# Patient Record
Sex: FEMALE | Race: BLACK OR AFRICAN AMERICAN | NOT HISPANIC OR LATINO | Employment: UNEMPLOYED | URBAN - METROPOLITAN AREA
[De-identification: names, ages, dates, MRNs, and addresses within clinical notes are randomized per-mention and may not be internally consistent; named-entity substitution may affect disease eponyms.]

---

## 2023-06-26 DIAGNOSIS — I10 HYPERTENSION, UNSPECIFIED TYPE: ICD-10-CM

## 2023-06-26 RX ORDER — CHLORTHALIDONE 25 MG/1
1 TABLET ORAL DAILY
COMMUNITY
Start: 2019-01-08 | End: 2023-06-26 | Stop reason: SDUPTHER

## 2023-06-26 RX ORDER — CHLORTHALIDONE 25 MG/1
25 TABLET ORAL DAILY
Qty: 90 TABLET | Refills: 0 | Status: SHIPPED | OUTPATIENT
Start: 2023-06-26 | End: 2023-07-18 | Stop reason: SINTOL

## 2023-07-02 LAB — URINE CULTURE: NORMAL

## 2023-07-12 ENCOUNTER — PATIENT OUTREACH (OUTPATIENT)
Dept: CARE COORDINATION | Facility: CLINIC | Age: 58
End: 2023-07-12
Payer: MEDICARE

## 2023-07-12 NOTE — PROGRESS NOTES
Discharge Facility:[facility]  Discharge Diagnosis:Urine Retention  Admission Date:07/05/23  Discharge Date: 07/11/23    PCP Appointment Date:07/18/23  Specialist Appointment Date:   Hospital Encounter and Summary: Linked   See discharge assessment below for further details

## 2023-07-13 ENCOUNTER — TELEPHONE (OUTPATIENT)
Dept: PRIMARY CARE | Facility: CLINIC | Age: 58
End: 2023-07-13
Payer: MEDICARE

## 2023-07-13 NOTE — TELEPHONE ENCOUNTER
PT stated that she just got out the hospital and was prescribed Amoxicillin 125 mg tablet. PT stated that she is itching all over possible allergy. PT stated that she needs different antibiotic please she cannot take the itching. Please advise            [facility]  [address]

## 2023-07-14 NOTE — TELEPHONE ENCOUNTER
PT called again and I advised her that Dr. Yeager has the message and has not yet gotten back to us yet about another antibiotic

## 2023-07-18 ENCOUNTER — OFFICE VISIT (OUTPATIENT)
Dept: PRIMARY CARE | Facility: CLINIC | Age: 58
End: 2023-07-18
Payer: MEDICARE

## 2023-07-18 ENCOUNTER — LAB (OUTPATIENT)
Dept: LAB | Facility: LAB | Age: 58
End: 2023-07-18
Payer: MEDICARE

## 2023-07-18 VITALS
SYSTOLIC BLOOD PRESSURE: 112 MMHG | DIASTOLIC BLOOD PRESSURE: 78 MMHG | RESPIRATION RATE: 18 BRPM | WEIGHT: 172.2 LBS | BODY MASS INDEX: 27.79 KG/M2 | OXYGEN SATURATION: 100 % | HEART RATE: 78 BPM

## 2023-07-18 DIAGNOSIS — N13.30 HYDROURETERONEPHROSIS: ICD-10-CM

## 2023-07-18 DIAGNOSIS — R91.8 LUNG INFILTRATE: ICD-10-CM

## 2023-07-18 DIAGNOSIS — N18.9 CHRONIC RENAL IMPAIRMENT, UNSPECIFIED CKD STAGE: ICD-10-CM

## 2023-07-18 DIAGNOSIS — I10 BENIGN ESSENTIAL HTN: ICD-10-CM

## 2023-07-18 DIAGNOSIS — I10 BENIGN ESSENTIAL HTN: Primary | ICD-10-CM

## 2023-07-18 PROBLEM — E78.5 ELEVATED LIPIDS: Status: ACTIVE | Noted: 2023-07-18

## 2023-07-18 PROBLEM — E55.9 AVITAMINOSIS D: Status: ACTIVE | Noted: 2023-07-18

## 2023-07-18 PROBLEM — M51.16 INTERVERTEBRAL DISC DISORDER WITH RADICULOPATHY OF LUMBAR REGION: Status: ACTIVE | Noted: 2023-07-18

## 2023-07-18 PROBLEM — C52 MALIGNANT NEOPLASM OF VAGINA (MULTI): Status: ACTIVE | Noted: 2023-07-18

## 2023-07-18 PROBLEM — E66.9 OBESITY (BMI 30-39.9): Status: ACTIVE | Noted: 2023-07-18

## 2023-07-18 PROBLEM — G54.2 DISORDER OF LEFT CERVICAL NERVE ROOT: Status: ACTIVE | Noted: 2023-07-18

## 2023-07-18 PROBLEM — M25.611 STIFFNESS OF RIGHT SHOULDER JOINT: Status: RESOLVED | Noted: 2023-07-18 | Resolved: 2023-07-18

## 2023-07-18 PROBLEM — R10.32 ABDOMINAL PAIN, LLQ (LEFT LOWER QUADRANT): Status: RESOLVED | Noted: 2023-07-18 | Resolved: 2023-07-18

## 2023-07-18 PROBLEM — R21 RASH/SKIN ERUPTION: Status: ACTIVE | Noted: 2023-07-18

## 2023-07-18 PROBLEM — E83.52 SERUM CALCIUM ELEVATED: Status: ACTIVE | Noted: 2023-07-18

## 2023-07-18 PROBLEM — B00.9 HSV INFECTION: Status: ACTIVE | Noted: 2023-07-18

## 2023-07-18 PROBLEM — I89.0 LYMPHEDEMA: Status: ACTIVE | Noted: 2023-07-18

## 2023-07-18 PROBLEM — N36.44 DETRUSOR SPHINCTER DYSSYNERGIA: Status: ACTIVE | Noted: 2023-07-18

## 2023-07-18 PROBLEM — N32.81 OVERACTIVE BLADDER: Status: ACTIVE | Noted: 2023-07-18

## 2023-07-18 PROBLEM — K59.00 CONSTIPATION: Status: RESOLVED | Noted: 2023-07-18 | Resolved: 2023-07-18

## 2023-07-18 PROBLEM — N39.0 ACUTE UTI: Status: RESOLVED | Noted: 2023-07-18 | Resolved: 2023-07-18

## 2023-07-18 PROBLEM — N39.8 VOIDING DYSFUNCTION: Status: ACTIVE | Noted: 2023-07-18

## 2023-07-18 PROBLEM — E87.6 HYPOKALEMIA: Status: ACTIVE | Noted: 2023-07-18

## 2023-07-18 PROBLEM — L65.9 HAIR LOSS: Status: RESOLVED | Noted: 2023-07-18 | Resolved: 2023-07-18

## 2023-07-18 PROBLEM — R33.9 URINARY RETENTION WITH INCOMPLETE BLADDER EMPTYING: Status: ACTIVE | Noted: 2023-07-18

## 2023-07-18 PROBLEM — R59.0 AXILLARY LYMPHADENOPATHY: Status: ACTIVE | Noted: 2023-07-18

## 2023-07-18 PROBLEM — E83.42 HYPOMAGNESEMIA: Status: ACTIVE | Noted: 2023-07-18

## 2023-07-18 PROBLEM — R23.8 SKIN IRRITATION: Status: RESOLVED | Noted: 2023-07-18 | Resolved: 2023-07-18

## 2023-07-18 PROBLEM — E21.0 PRIMARY HYPERPARATHYROIDISM (MULTI): Status: ACTIVE | Noted: 2023-07-18

## 2023-07-18 PROBLEM — E89.2 STATUS POST PARATHYROIDECTOMY (CMS/HCC): Status: ACTIVE | Noted: 2023-07-18

## 2023-07-18 PROBLEM — G62.9 NEUROPATHY: Status: ACTIVE | Noted: 2023-07-18

## 2023-07-18 PROBLEM — M25.511 RIGHT SHOULDER PAIN: Status: RESOLVED | Noted: 2023-07-18 | Resolved: 2023-07-18

## 2023-07-18 PROBLEM — R00.2 PALPITATIONS: Status: ACTIVE | Noted: 2023-07-18

## 2023-07-18 PROBLEM — M81.8 OTHER OSTEOPOROSIS WITHOUT CURRENT PATHOLOGICAL FRACTURE: Status: ACTIVE | Noted: 2023-07-18

## 2023-07-18 PROBLEM — M54.16 LUMBAR NEURITIS: Status: ACTIVE | Noted: 2023-07-18

## 2023-07-18 PROBLEM — L03.90 CELLULITIS: Status: RESOLVED | Noted: 2023-07-18 | Resolved: 2023-07-18

## 2023-07-18 PROBLEM — N89.5 ACQUIRED VAGINAL ADHESIONS: Status: ACTIVE | Noted: 2023-07-18

## 2023-07-18 PROBLEM — M85.80 OSTEOPENIA: Status: ACTIVE | Noted: 2023-07-18

## 2023-07-18 PROBLEM — M54.12 CERVICAL NEURITIS: Status: ACTIVE | Noted: 2023-07-18

## 2023-07-18 PROBLEM — N31.9 NEUROGENIC BLADDER: Status: ACTIVE | Noted: 2023-07-18

## 2023-07-18 PROBLEM — M25.532 LEFT WRIST PAIN: Status: RESOLVED | Noted: 2023-07-18 | Resolved: 2023-07-18

## 2023-07-18 PROBLEM — M54.9 BACK PAIN: Status: RESOLVED | Noted: 2023-07-18 | Resolved: 2023-07-18

## 2023-07-18 LAB
ANION GAP IN SER/PLAS: 16 MMOL/L (ref 10–20)
BASOPHILS (10*3/UL) IN BLOOD BY AUTOMATED COUNT: 0.07 X10E9/L (ref 0–0.1)
BASOPHILS/100 LEUKOCYTES IN BLOOD BY AUTOMATED COUNT: 0.8 % (ref 0–2)
CALCIUM (MG/DL) IN SER/PLAS: 9.4 MG/DL (ref 8.6–10.3)
CARBON DIOXIDE, TOTAL (MMOL/L) IN SER/PLAS: 19 MMOL/L (ref 21–32)
CHLORIDE (MMOL/L) IN SER/PLAS: 105 MMOL/L (ref 98–107)
CREATININE (MG/DL) IN SER/PLAS: 1.16 MG/DL (ref 0.5–1.05)
EOSINOPHILS (10*3/UL) IN BLOOD BY AUTOMATED COUNT: 0.16 X10E9/L (ref 0–0.7)
EOSINOPHILS/100 LEUKOCYTES IN BLOOD BY AUTOMATED COUNT: 1.9 % (ref 0–6)
ERYTHROCYTE DISTRIBUTION WIDTH (RATIO) BY AUTOMATED COUNT: 18.6 % (ref 11.5–14.5)
ERYTHROCYTE MEAN CORPUSCULAR HEMOGLOBIN CONCENTRATION (G/DL) BY AUTOMATED: 31.2 G/DL (ref 32–36)
ERYTHROCYTE MEAN CORPUSCULAR VOLUME (FL) BY AUTOMATED COUNT: 89 FL (ref 80–100)
ERYTHROCYTES (10*6/UL) IN BLOOD BY AUTOMATED COUNT: 3.02 X10E12/L (ref 4–5.2)
GFR FEMALE: 55 ML/MIN/1.73M2
GLUCOSE (MG/DL) IN SER/PLAS: 96 MG/DL (ref 74–99)
HEMATOCRIT (%) IN BLOOD BY AUTOMATED COUNT: 26.9 % (ref 36–46)
HEMOGLOBIN (G/DL) IN BLOOD: 8.4 G/DL (ref 12–16)
IMMATURE GRANULOCYTES/100 LEUKOCYTES IN BLOOD BY AUTOMATED COUNT: 1.3 % (ref 0–0.9)
LEUKOCYTES (10*3/UL) IN BLOOD BY AUTOMATED COUNT: 8.5 X10E9/L (ref 4.4–11.3)
LYMPHOCYTES (10*3/UL) IN BLOOD BY AUTOMATED COUNT: 1.66 X10E9/L (ref 1.2–4.8)
LYMPHOCYTES/100 LEUKOCYTES IN BLOOD BY AUTOMATED COUNT: 19.5 % (ref 13–44)
MONOCYTES (10*3/UL) IN BLOOD BY AUTOMATED COUNT: 0.66 X10E9/L (ref 0.1–1)
MONOCYTES/100 LEUKOCYTES IN BLOOD BY AUTOMATED COUNT: 7.8 % (ref 2–10)
NEUTROPHILS (10*3/UL) IN BLOOD BY AUTOMATED COUNT: 5.85 X10E9/L (ref 1.2–7.7)
NEUTROPHILS/100 LEUKOCYTES IN BLOOD BY AUTOMATED COUNT: 68.7 % (ref 40–80)
PLATELETS (10*3/UL) IN BLOOD AUTOMATED COUNT: 526 X10E9/L (ref 150–450)
POTASSIUM (MMOL/L) IN SER/PLAS: 3.4 MMOL/L (ref 3.5–5.3)
SODIUM (MMOL/L) IN SER/PLAS: 137 MMOL/L (ref 136–145)
UREA NITROGEN (MG/DL) IN SER/PLAS: 18 MG/DL (ref 6–23)

## 2023-07-18 PROCEDURE — 80048 BASIC METABOLIC PNL TOTAL CA: CPT

## 2023-07-18 PROCEDURE — 3074F SYST BP LT 130 MM HG: CPT

## 2023-07-18 PROCEDURE — 36415 COLL VENOUS BLD VENIPUNCTURE: CPT

## 2023-07-18 PROCEDURE — 99496 TRANSJ CARE MGMT HIGH F2F 7D: CPT

## 2023-07-18 PROCEDURE — 3078F DIAST BP <80 MM HG: CPT

## 2023-07-18 PROCEDURE — 1036F TOBACCO NON-USER: CPT

## 2023-07-18 PROCEDURE — 85025 COMPLETE CBC W/AUTO DIFF WBC: CPT

## 2023-07-18 RX ORDER — CONJUGATED ESTROGENS 0.62 MG/G
CREAM VAGINAL
COMMUNITY
Start: 2023-01-24 | End: 2024-03-20 | Stop reason: ALTCHOICE

## 2023-07-18 RX ORDER — DOCUSATE SODIUM 100 MG/1
CAPSULE, LIQUID FILLED ORAL 2 TIMES DAILY PRN
COMMUNITY
Start: 2019-11-04

## 2023-07-18 RX ORDER — ALFUZOSIN HYDROCHLORIDE 10 MG/1
TABLET, EXTENDED RELEASE ORAL
Status: ON HOLD | COMMUNITY
End: 2023-11-27 | Stop reason: WASHOUT

## 2023-07-18 RX ORDER — POLYETHYLENE GLYCOL 3350 17 G/17G
17 POWDER, FOR SOLUTION ORAL DAILY PRN
COMMUNITY
Start: 2019-11-04

## 2023-07-18 RX ORDER — FERROUS GLUCONATE 324(38)MG
1 TABLET ORAL DAILY
Status: ON HOLD | COMMUNITY
Start: 2023-07-11 | End: 2023-11-27 | Stop reason: WASHOUT

## 2023-07-18 RX ORDER — ACETAMINOPHEN 500 MG
1 TABLET ORAL DAILY
COMMUNITY

## 2023-07-18 NOTE — PROGRESS NOTES
Primary Care Provider: Maninder Yeager MD    Subjective   Laurence Campbell is a 57 y.o. female who presents for Follow-up (Hospital follow up.).    Hospital Follow up    Admitted: 7/5/23  Discharged: 7/11/23    PMH of history of vaginal CA status post tumor excision,  history right nephrectomy, chronic urinary retention requiring intermittent catheterization, frequent UTIs, HTN, hyperparathyroidism, osteoporosis, LLE lymphedema, history of HSV infection.      In the hospital, underwent cystoscopy with ureteral stent insertion on 7/5/2023, hydronephrosis visualized. Meza cath was placed.  Hematuria: Acute blood loss anemia status post PRBC on 7/5 and 7/6   Anemia  Acute renal failure  History chronic urinary retention   Left hydronephrosis status post ureteral stent   Possible urothelial lesion   History vaginal CA   Constipation     Was stated that she will ultimately need a ureteroscopy to identify the cause of obstruction. Maintain meza on DC until follow up with urology.    CT A/P with mod/severe left hydroureteronephrosis, possible   urothelial lesion, liver cysts, small bilateral pleural effusions, small   pericardial effusion.     Possible RUL infiltrate (asymptomatic) - was on zosyn; transitioned to PO Augmentin- developed allergy to Augmentin so could not complete    Has follow up apt with Urology and GYN scheduled    Feeling much better; tired             Review of Systems   Constitutional:  Positive for fatigue. Negative for activity change, appetite change, chills, diaphoresis, fever and unexpected weight change.   HENT: Negative.  Negative for congestion, dental problem, ear discharge, ear pain, hearing loss, mouth sores, nosebleeds, postnasal drip, rhinorrhea, sinus pressure, sneezing, sore throat, tinnitus, trouble swallowing and voice change.    Eyes: Negative.  Negative for photophobia, discharge and visual disturbance.   Respiratory: Negative.  Negative for apnea, cough, chest tightness,  shortness of breath, wheezing and stridor.    Cardiovascular: Negative.  Negative for chest pain, palpitations and leg swelling.   Gastrointestinal: Negative.  Negative for abdominal distention, abdominal pain, anal bleeding, blood in stool, constipation, diarrhea, nausea and rectal pain.   Endocrine: Negative.  Negative for cold intolerance, heat intolerance, polydipsia, polyphagia and polyuria.   Genitourinary:  Positive for hematuria. Negative for decreased urine volume, difficulty urinating, dysuria, flank pain, frequency and urgency.        Chowdhury cath   Musculoskeletal: Negative.  Negative for back pain, gait problem, joint swelling, myalgias, neck pain and neck stiffness.   Skin: Negative.  Negative for color change and rash.   Neurological: Negative.  Negative for dizziness, tremors, seizures, syncope, speech difficulty, weakness, light-headedness, numbness and headaches.   Hematological: Negative.  Does not bruise/bleed easily.   Psychiatric/Behavioral: Negative.  Negative for agitation, confusion, dysphoric mood, sleep disturbance and suicidal ideas. The patient is not nervous/anxious and is not hyperactive.    All other systems reviewed and are negative.        Objective   /78   Pulse 78   Resp 18   Wt 78.1 kg (172 lb 3.2 oz)   SpO2 100%   BMI 27.79 kg/m²     Physical Exam  Vitals reviewed.   Constitutional:       General: She is not in acute distress.     Appearance: Normal appearance. She is normal weight. She is not ill-appearing, toxic-appearing or diaphoretic.   HENT:      Head: Normocephalic and atraumatic.      Nose: Nose normal.   Eyes:      Extraocular Movements: Extraocular movements intact.      Conjunctiva/sclera: Conjunctivae normal.      Pupils: Pupils are equal, round, and reactive to light.   Neck:      Vascular: No carotid bruit.   Cardiovascular:      Rate and Rhythm: Normal rate and regular rhythm.      Pulses: Normal pulses.      Heart sounds: Normal heart sounds. No murmur  heard.     No friction rub. No gallop.   Pulmonary:      Effort: Pulmonary effort is normal. No respiratory distress.      Breath sounds: Normal breath sounds.   Abdominal:      General: Abdomen is flat. Bowel sounds are normal.      Palpations: Abdomen is soft.   Musculoskeletal:         General: Normal range of motion.      Cervical back: Normal range of motion and neck supple.   Lymphadenopathy:      Cervical: No cervical adenopathy.   Skin:     General: Skin is warm and dry.      Capillary Refill: Capillary refill takes less than 2 seconds.   Neurological:      General: No focal deficit present.      Mental Status: She is alert and oriented to person, place, and time. Mental status is at baseline.   Psychiatric:         Mood and Affect: Mood normal.         Behavior: Behavior normal.         Thought Content: Thought content normal.         Judgment: Judgment normal.         Assessment/Plan   Problem List Items Addressed This Visit       Benign essential HTN - Primary    Relevant Orders    CBC and Auto Differential (Completed)    Basic metabolic panel (Completed)     Other Visit Diagnoses       Chronic renal impairment, unspecified CKD stage        Relevant Orders    CBC and Auto Differential (Completed)    Basic metabolic panel (Completed)    Referral to Nephrology    Hydroureteronephrosis        Relevant Orders    CBC and Auto Differential (Completed)    Basic metabolic panel (Completed)    Lung infiltrate        Relevant Orders    CBC and Auto Differential (Completed)    Basic metabolic panel (Completed)    XR chest 2 views (Completed)            CXR with right consolidation- new abx sent.  Renal function slightly improved      Follow up with GYN and urology      Follow up in 3-4 months or sooner as needed

## 2023-07-19 DIAGNOSIS — J18.9 PNEUMONIA OF RIGHT LUNG DUE TO INFECTIOUS ORGANISM, UNSPECIFIED PART OF LUNG: ICD-10-CM

## 2023-07-19 DIAGNOSIS — R91.8 LUNG INFILTRATE: Primary | ICD-10-CM

## 2023-07-19 RX ORDER — DOXYCYCLINE 100 MG/1
100 CAPSULE ORAL 2 TIMES DAILY
Qty: 14 CAPSULE | Refills: 0 | Status: SHIPPED | OUTPATIENT
Start: 2023-07-19 | End: 2023-07-26

## 2023-07-20 ENCOUNTER — PATIENT OUTREACH (OUTPATIENT)
Dept: CARE COORDINATION | Facility: CLINIC | Age: 58
End: 2023-07-20
Payer: MEDICARE

## 2023-07-20 NOTE — PROGRESS NOTES
Call regarding appt. with PCP on 07/18/23 after hospitalization.  At time of outreach call the patient feels as if their condition has (improved  since last visit.  Reviewed the PCP appointment with the pt and addressed any questions or concerns.

## 2023-07-21 ASSESSMENT — ENCOUNTER SYMPTOMS
PALPITATIONS: 0
HEADACHES: 0
ABDOMINAL DISTENTION: 0
RECTAL PAIN: 0
JOINT SWELLING: 0
POLYDIPSIA: 0
AGITATION: 0
WEAKNESS: 0
SPEECH DIFFICULTY: 0
CARDIOVASCULAR NEGATIVE: 1
ENDOCRINE NEGATIVE: 1
RESPIRATORY NEGATIVE: 1
TROUBLE SWALLOWING: 0
DIARRHEA: 0
BRUISES/BLEEDS EASILY: 0
BACK PAIN: 0
SLEEP DISTURBANCE: 0
GASTROINTESTINAL NEGATIVE: 1
NAUSEA: 0
FATIGUE: 1
DIAPHORESIS: 0
RHINORRHEA: 0
MYALGIAS: 0
MUSCULOSKELETAL NEGATIVE: 1
SORE THROAT: 0
SEIZURES: 0
HEMATURIA: 1
DYSPHORIC MOOD: 0
FREQUENCY: 0
DIFFICULTY URINATING: 0
HYPERACTIVE: 0
SHORTNESS OF BREATH: 0
DIZZINESS: 0
ACTIVITY CHANGE: 0
ABDOMINAL PAIN: 0
TREMORS: 0
CONSTIPATION: 0
NEUROLOGICAL NEGATIVE: 1
APNEA: 0
COUGH: 0
UNEXPECTED WEIGHT CHANGE: 0
CHEST TIGHTNESS: 0
FEVER: 0
CONFUSION: 0
WHEEZING: 0
COLOR CHANGE: 0
STRIDOR: 0
PSYCHIATRIC NEGATIVE: 1
POLYPHAGIA: 0
ANAL BLEEDING: 0
NECK STIFFNESS: 0
EYE DISCHARGE: 0
SINUS PRESSURE: 0
FLANK PAIN: 0
VOICE CHANGE: 0
NUMBNESS: 0
NERVOUS/ANXIOUS: 0
DYSURIA: 0
PHOTOPHOBIA: 0
BLOOD IN STOOL: 0
HEMATOLOGIC/LYMPHATIC NEGATIVE: 1
LIGHT-HEADEDNESS: 0
CHILLS: 0
EYES NEGATIVE: 1
APPETITE CHANGE: 0
NECK PAIN: 0

## 2023-07-25 LAB
ANION GAP IN SER/PLAS: 15 MMOL/L (ref 10–20)
CALCIUM (MG/DL) IN SER/PLAS: 10.5 MG/DL (ref 8.6–10.6)
CARBON DIOXIDE, TOTAL (MMOL/L) IN SER/PLAS: 23 MMOL/L (ref 21–32)
CHLORIDE (MMOL/L) IN SER/PLAS: 107 MMOL/L (ref 98–107)
CREATININE (MG/DL) IN SER/PLAS: 1.19 MG/DL (ref 0.5–1.05)
ERYTHROCYTE DISTRIBUTION WIDTH (RATIO) BY AUTOMATED COUNT: 19.8 % (ref 11.5–14.5)
ERYTHROCYTE MEAN CORPUSCULAR HEMOGLOBIN CONCENTRATION (G/DL) BY AUTOMATED: 30.2 G/DL (ref 32–36)
ERYTHROCYTE MEAN CORPUSCULAR VOLUME (FL) BY AUTOMATED COUNT: 95 FL (ref 80–100)
ERYTHROCYTES (10*6/UL) IN BLOOD BY AUTOMATED COUNT: 3.01 X10E12/L (ref 4–5.2)
GFR FEMALE: 53 ML/MIN/1.73M2
GLUCOSE (MG/DL) IN SER/PLAS: 92 MG/DL (ref 74–99)
HEMATOCRIT (%) IN BLOOD BY AUTOMATED COUNT: 28.5 % (ref 36–46)
HEMOGLOBIN (G/DL) IN BLOOD: 8.6 G/DL (ref 12–16)
LEUKOCYTES (10*3/UL) IN BLOOD BY AUTOMATED COUNT: 6.5 X10E9/L (ref 4.4–11.3)
NRBC (PER 100 WBCS) BY AUTOMATED COUNT: 0 /100 WBC (ref 0–0)
PLATELETS (10*3/UL) IN BLOOD AUTOMATED COUNT: 465 X10E9/L (ref 150–450)
POTASSIUM (MMOL/L) IN SER/PLAS: 4.2 MMOL/L (ref 3.5–5.3)
SODIUM (MMOL/L) IN SER/PLAS: 141 MMOL/L (ref 136–145)
UREA NITROGEN (MG/DL) IN SER/PLAS: 18 MG/DL (ref 6–23)

## 2023-08-04 LAB
ALBUMIN (G/DL) IN SER/PLAS: 3.7 G/DL (ref 3.4–5)
ANION GAP IN SER/PLAS: 14 MMOL/L (ref 10–20)
CALCIUM (MG/DL) IN SER/PLAS: 9.9 MG/DL (ref 8.6–10.6)
CARBON DIOXIDE, TOTAL (MMOL/L) IN SER/PLAS: 23 MMOL/L (ref 21–32)
CHLORIDE (MMOL/L) IN SER/PLAS: 107 MMOL/L (ref 98–107)
CREATININE (MG/DL) IN SER/PLAS: 1.12 MG/DL (ref 0.5–1.05)
ERYTHROCYTE DISTRIBUTION WIDTH (RATIO) BY AUTOMATED COUNT: 16.9 % (ref 11.5–14.5)
ERYTHROCYTE MEAN CORPUSCULAR HEMOGLOBIN CONCENTRATION (G/DL) BY AUTOMATED: 31.6 G/DL (ref 32–36)
ERYTHROCYTE MEAN CORPUSCULAR VOLUME (FL) BY AUTOMATED COUNT: 91 FL (ref 80–100)
ERYTHROCYTES (10*6/UL) IN BLOOD BY AUTOMATED COUNT: 3.01 X10E12/L (ref 4–5.2)
GFR FEMALE: 57 ML/MIN/1.73M2
GLUCOSE (MG/DL) IN SER/PLAS: 101 MG/DL (ref 74–99)
HEMATOCRIT (%) IN BLOOD BY AUTOMATED COUNT: 27.5 % (ref 36–46)
HEMOGLOBIN (G/DL) IN BLOOD: 8.7 G/DL (ref 12–16)
LEUKOCYTES (10*3/UL) IN BLOOD BY AUTOMATED COUNT: 6.4 X10E9/L (ref 4.4–11.3)
NRBC (PER 100 WBCS) BY AUTOMATED COUNT: 0 /100 WBC (ref 0–0)
PHOSPHATE (MG/DL) IN SER/PLAS: 4.3 MG/DL (ref 2.5–4.9)
PLATELETS (10*3/UL) IN BLOOD AUTOMATED COUNT: 282 X10E9/L (ref 150–450)
POTASSIUM (MMOL/L) IN SER/PLAS: 3.7 MMOL/L (ref 3.5–5.3)
SODIUM (MMOL/L) IN SER/PLAS: 140 MMOL/L (ref 136–145)
UREA NITROGEN (MG/DL) IN SER/PLAS: 12 MG/DL (ref 6–23)

## 2023-08-08 ENCOUNTER — PATIENT OUTREACH (OUTPATIENT)
Dept: CARE COORDINATION | Facility: CLINIC | Age: 58
End: 2023-08-08
Payer: MEDICARE

## 2023-08-08 NOTE — PROGRESS NOTES
Discharge Facility:[facility]  Discharge Diagnosis:  Hematuria  Admission Date:08/06/23  Discharge Date: 08/07/23    PCP Appointment Date:08/16/23  Specialist Appointment Date:   Hospital Encounter and Summary: Linked   See discharge assessment below for further details  Engagement  Call Start Time: 0902 (8/8/2023  9:02 AM)    Medications  Medications reviewed with patient/caregiver?: Yes (no new meds) (8/8/2023  9:02 AM)  Does the patient have all medications ordered at discharge?: Not applicable (8/8/2023  9:02 AM)  Is the patient taking all medications as directed (includes completed medication regime)?: Yes (8/8/2023  9:02 AM)    Appointments  Does the patient have a primary care provider?: Yes (8/8/2023  9:02 AM)    Self Management  Has home health visited the patient within 72 hours of discharge?: Not applicable (8/8/2023  9:02 AM)  Has all Durable Medical Equipment (DME) been delivered?: No (8/8/2023  9:02 AM)    Patient Teaching  Does the patient have access to their discharge instructions?: Yes (8/8/2023  9:02 AM)  Care Management Interventions: Reviewed instructions with patient (8/8/2023  9:02 AM)  What is the patient's perception of their health status since discharge?: Same (8/8/2023  9:02 AM)    Wrap Up  Call End Time: 0910 (8/8/2023  9:02 AM)

## 2023-08-16 ENCOUNTER — LAB (OUTPATIENT)
Dept: LAB | Facility: LAB | Age: 58
End: 2023-08-16
Payer: MEDICARE

## 2023-08-16 ENCOUNTER — OFFICE VISIT (OUTPATIENT)
Dept: PRIMARY CARE | Facility: CLINIC | Age: 58
End: 2023-08-16
Payer: MEDICARE

## 2023-08-16 VITALS
WEIGHT: 177.2 LBS | RESPIRATION RATE: 22 BRPM | HEART RATE: 106 BPM | BODY MASS INDEX: 28.6 KG/M2 | OXYGEN SATURATION: 100 % | SYSTOLIC BLOOD PRESSURE: 128 MMHG | DIASTOLIC BLOOD PRESSURE: 75 MMHG

## 2023-08-16 DIAGNOSIS — R31.9 HEMATURIA, UNSPECIFIED TYPE: ICD-10-CM

## 2023-08-16 DIAGNOSIS — D64.9 ANEMIA, UNSPECIFIED TYPE: ICD-10-CM

## 2023-08-16 DIAGNOSIS — D64.9 ANEMIA, UNSPECIFIED TYPE: Primary | ICD-10-CM

## 2023-08-16 DIAGNOSIS — B00.9 HSV INFECTION: ICD-10-CM

## 2023-08-16 PROBLEM — G89.29 MUSCULOSKELETAL PAIN, CHRONIC: Status: ACTIVE | Noted: 2018-06-25

## 2023-08-16 PROBLEM — M79.2 NEUROPATHIC PAIN: Status: ACTIVE | Noted: 2018-06-25

## 2023-08-16 PROBLEM — G95.89 OTHER SPECIFIED DISEASES OF SPINAL CORD (MULTI): Status: ACTIVE | Noted: 2018-04-23

## 2023-08-16 PROBLEM — M79.18 MUSCULOSKELETAL PAIN, CHRONIC: Status: ACTIVE | Noted: 2018-06-25

## 2023-08-16 LAB
BASOPHILS (10*3/UL) IN BLOOD BY AUTOMATED COUNT: 0.03 X10E9/L (ref 0–0.1)
BASOPHILS/100 LEUKOCYTES IN BLOOD BY AUTOMATED COUNT: 0.4 % (ref 0–2)
EOSINOPHILS (10*3/UL) IN BLOOD BY AUTOMATED COUNT: 0.1 X10E9/L (ref 0–0.7)
EOSINOPHILS/100 LEUKOCYTES IN BLOOD BY AUTOMATED COUNT: 1.3 % (ref 0–6)
ERYTHROCYTE DISTRIBUTION WIDTH (RATIO) BY AUTOMATED COUNT: 15 % (ref 11.5–14.5)
ERYTHROCYTE MEAN CORPUSCULAR HEMOGLOBIN CONCENTRATION (G/DL) BY AUTOMATED: 29.9 G/DL (ref 32–36)
ERYTHROCYTE MEAN CORPUSCULAR VOLUME (FL) BY AUTOMATED COUNT: 96 FL (ref 80–100)
ERYTHROCYTES (10*6/UL) IN BLOOD BY AUTOMATED COUNT: 2.43 X10E12/L (ref 4–5.2)
HEMATOCRIT (%) IN BLOOD BY AUTOMATED COUNT: 23.4 % (ref 36–46)
HEMOGLOBIN (G/DL) IN BLOOD: 7 G/DL (ref 12–16)
IMMATURE GRANULOCYTES/100 LEUKOCYTES IN BLOOD BY AUTOMATED COUNT: 0.6 % (ref 0–0.9)
LEUKOCYTES (10*3/UL) IN BLOOD BY AUTOMATED COUNT: 8 X10E9/L (ref 4.4–11.3)
LYMPHOCYTES (10*3/UL) IN BLOOD BY AUTOMATED COUNT: 1.71 X10E9/L (ref 1.2–4.8)
LYMPHOCYTES/100 LEUKOCYTES IN BLOOD BY AUTOMATED COUNT: 21.5 % (ref 13–44)
MONOCYTES (10*3/UL) IN BLOOD BY AUTOMATED COUNT: 0.66 X10E9/L (ref 0.1–1)
MONOCYTES/100 LEUKOCYTES IN BLOOD BY AUTOMATED COUNT: 8.3 % (ref 2–10)
NEUTROPHILS (10*3/UL) IN BLOOD BY AUTOMATED COUNT: 5.42 X10E9/L (ref 1.2–7.7)
NEUTROPHILS/100 LEUKOCYTES IN BLOOD BY AUTOMATED COUNT: 67.9 % (ref 40–80)
NRBC (PER 100 WBCS) BY AUTOMATED COUNT: 0 /100 WBC (ref 0–0)
PLATELETS (10*3/UL) IN BLOOD AUTOMATED COUNT: 430 X10E9/L (ref 150–450)

## 2023-08-16 PROCEDURE — 83540 ASSAY OF IRON: CPT

## 2023-08-16 PROCEDURE — 36415 COLL VENOUS BLD VENIPUNCTURE: CPT

## 2023-08-16 PROCEDURE — 83550 IRON BINDING TEST: CPT

## 2023-08-16 PROCEDURE — 99214 OFFICE O/P EST MOD 30 MIN: CPT | Performed by: INTERNAL MEDICINE

## 2023-08-16 PROCEDURE — 3074F SYST BP LT 130 MM HG: CPT | Performed by: INTERNAL MEDICINE

## 2023-08-16 PROCEDURE — 82728 ASSAY OF FERRITIN: CPT

## 2023-08-16 PROCEDURE — 80048 BASIC METABOLIC PNL TOTAL CA: CPT

## 2023-08-16 PROCEDURE — 85025 COMPLETE CBC W/AUTO DIFF WBC: CPT

## 2023-08-16 PROCEDURE — 1036F TOBACCO NON-USER: CPT | Performed by: INTERNAL MEDICINE

## 2023-08-16 PROCEDURE — 3078F DIAST BP <80 MM HG: CPT | Performed by: INTERNAL MEDICINE

## 2023-08-16 RX ORDER — WHEAT DEXTRIN 3 G/3.5 G
POWDER IN PACKET (EA) ORAL
COMMUNITY
Start: 2020-02-13 | End: 2024-03-20 | Stop reason: ALTCHOICE

## 2023-08-16 RX ORDER — ACETAMINOPHEN 500 MG
2 TABLET ORAL DAILY
COMMUNITY
Start: 2019-02-24

## 2023-08-16 RX ORDER — POTASSIUM CHLORIDE 600 MG/1
CAPSULE, EXTENDED RELEASE ORAL
COMMUNITY
End: 2024-03-20 | Stop reason: ALTCHOICE

## 2023-08-16 RX ORDER — MELOXICAM 7.5 MG/1
TABLET ORAL
COMMUNITY
End: 2024-03-09 | Stop reason: ALTCHOICE

## 2023-08-16 RX ORDER — DENOSUMAB 60 MG/ML
INJECTION SUBCUTANEOUS
Status: ON HOLD | COMMUNITY
Start: 2021-11-18 | End: 2023-11-27 | Stop reason: WASHOUT

## 2023-08-16 RX ORDER — ACYCLOVIR 400 MG/1
TABLET ORAL
COMMUNITY
Start: 2018-10-23 | End: 2023-08-16 | Stop reason: ALTCHOICE

## 2023-08-16 RX ORDER — ACYCLOVIR 400 MG/1
400 TABLET ORAL 2 TIMES DAILY
Qty: 60 TABLET | Refills: 6 | Status: SHIPPED | OUTPATIENT
Start: 2023-08-16 | End: 2024-02-12

## 2023-08-16 RX ORDER — DULOXETIN HYDROCHLORIDE 60 MG/1
CAPSULE, DELAYED RELEASE ORAL
COMMUNITY
Start: 2021-06-10 | End: 2023-10-30 | Stop reason: ALTCHOICE

## 2023-08-16 RX ORDER — VALACYCLOVIR HYDROCHLORIDE 500 MG/1
500 TABLET, FILM COATED ORAL 2 TIMES DAILY
COMMUNITY
Start: 2023-07-25 | End: 2024-03-20 | Stop reason: ALTCHOICE

## 2023-08-16 RX ORDER — ERGOCALCIFEROL (VITAMIN D2) 50 MCG
CAPSULE ORAL
Status: ON HOLD | COMMUNITY
End: 2023-11-27 | Stop reason: WASHOUT

## 2023-08-16 NOTE — PROGRESS NOTES
Subjective   Patient ID: Laurence Campbell is a 57 y.o. female who presents for Hospital Follow-up.    DOA- 8/6/23    DOD-8/7/23    Hematuria -Bladder clots- Catheter placement-Dr Carbajal    Anemia    H/o Radiation Cystitis    Chronic LLE Lymphedema            HPI     Review of Systems    Blood in urine    Fatigue      No Fever/chills/headaches/dizziness/chest pains/ shortness of breath/palpitations/Nausea/vomiting/diarrhea/ constipation/urine frequency/      Objective   /75 (BP Location: Left arm, Patient Position: Sitting, BP Cuff Size: Adult)   Pulse 106   Resp 22   Wt 80.4 kg (177 lb 3.2 oz)   SpO2 100%   BMI 28.60 kg/m²     Physical Exam    No JVP elevation. No palpable Lymph Nodes. No Thyromegaly    CVS-NL S1/S2 . No MRG    Lungs-CTA. B/S= B/L    Abdomen-Soft, Non-tender. No masses or HSM    Extremities: No C/C/    LLE Edema 3+    Urine Catheter-Blood       Assessment/Plan     Hematuria -Bladder clots- Catheter placement-Dr Carbajal    Anemia    H/o Radiation Cystitis    R Nephrectomy    L hydronephrosis    Chronic LLE Lymphedema      Plan:    Labs-CBC/BMP/Iron studies    Continue with current Rx    Follow up/ Call with any concerns    Follow up in 6 months/PRN

## 2023-08-17 DIAGNOSIS — D50.9 IRON DEFICIENCY ANEMIA, UNSPECIFIED IRON DEFICIENCY ANEMIA TYPE: Primary | ICD-10-CM

## 2023-08-17 LAB
ANION GAP IN SER/PLAS: 15 MMOL/L (ref 10–20)
CALCIUM (MG/DL) IN SER/PLAS: 9.7 MG/DL (ref 8.6–10.6)
CARBON DIOXIDE, TOTAL (MMOL/L) IN SER/PLAS: 22 MMOL/L (ref 21–32)
CHLORIDE (MMOL/L) IN SER/PLAS: 107 MMOL/L (ref 98–107)
CREATININE (MG/DL) IN SER/PLAS: 1.09 MG/DL (ref 0.5–1.05)
FERRITIN (UG/LL) IN SER/PLAS: 50 UG/L (ref 8–150)
GFR FEMALE: 59 ML/MIN/1.73M2
GLUCOSE (MG/DL) IN SER/PLAS: 98 MG/DL (ref 74–99)
IRON (UG/DL) IN SER/PLAS: 10 UG/DL (ref 35–150)
IRON BINDING CAPACITY (UG/DL) IN SER/PLAS: 309 UG/DL (ref 240–445)
IRON SATURATION (%) IN SER/PLAS: 3 % (ref 25–45)
POTASSIUM (MMOL/L) IN SER/PLAS: 4 MMOL/L (ref 3.5–5.3)
SODIUM (MMOL/L) IN SER/PLAS: 140 MMOL/L (ref 136–145)
UREA NITROGEN (MG/DL) IN SER/PLAS: 13 MG/DL (ref 6–23)

## 2023-08-18 ENCOUNTER — TELEPHONE (OUTPATIENT)
Dept: PRIMARY CARE | Facility: CLINIC | Age: 58
End: 2023-08-18
Payer: MEDICARE

## 2023-08-18 NOTE — TELEPHONE ENCOUNTER
----- Message from Maninder Yeager MD sent at 8/17/2023  5:49 PM EDT -----  Regarding: Iron-Deficiency Anemia  Please notify PT that she should continue with Iron Replacement therapy. I would like her to see hematology regarding Iron Infusion therapy. Referral  to hematology  placed in Epic     ----- Message -----  From: Lab, Background User  Sent: 8/16/2023  11:52 PM EDT  To: Maninder Yeager MD

## 2023-08-18 NOTE — TELEPHONE ENCOUNTER
Called patient and left a detailed voice mail informing the patient of [facility] message about her results below;    Please notify PT that she should continue with Iron Replacement therapy. I would like her to see hematology regarding Iron Infusion therapy. Referral  to hematology  placed in Epic     Pt voice mail contained full name      Lianna Sandhu MA

## 2023-08-22 ENCOUNTER — PATIENT OUTREACH (OUTPATIENT)
Dept: CARE COORDINATION | Facility: CLINIC | Age: 58
End: 2023-08-22
Payer: MEDICARE

## 2023-08-22 NOTE — PROGRESS NOTES
Unable to reach patient for call back after patient's follow up appointment with PCP.   RADHAM with call back number for patient to call if needed   If no voicemail available call attempts x 2 were made to contact the patient to assist with any questions or concerns patient may have.

## 2023-08-28 ENCOUNTER — PATIENT OUTREACH (OUTPATIENT)
Dept: CARE COORDINATION | Facility: CLINIC | Age: 58
End: 2023-08-28
Payer: MEDICARE

## 2023-08-28 NOTE — PROGRESS NOTES
Discharge Facility:[facility]  Discharge Diagnosis:UTI  Admission Date:08/22/23  Discharge Date: 08/27/23    PCP Appointment Date:  Specialist Appointment Date:   Hospital Encounter and Summary: Linked   See discharge assessment below for further details  /Engagement  Call Start Time: 0848 (8/28/2023  8:48 AM)    Medications  Medications reviewed with patient/caregiver?: Yes (no new meds) (8/28/2023  8:48 AM)  Is the patient having any side effects they believe may be caused by any medication additions or changes?: No (8/28/2023  8:48 AM)  Does the patient have all medications ordered at discharge?: Not applicable (8/28/2023  8:48 AM)  Is the patient taking all medications as directed (includes completed medication regime)?: Not applicable (8/28/2023  8:48 AM)    Appointments  Does the patient have a primary care provider?: Yes (8/28/2023  8:48 AM)  Care Management Interventions: Advised patient to make appointment (8/28/2023  8:48 AM)    Self Management  Has home health visited the patient within 72 hours of discharge?: Yes (8/28/2023  8:48 AM)  Has all Durable Medical Equipment (DME) been delivered?: No (8/28/2023  8:48 AM)    Patient Teaching  Does the patient have access to their discharge instructions?: Yes (8/28/2023  8:48 AM)  Care Management Interventions: Reviewed instructions with patient (8/28/2023  8:48 AM)  What is the patient's perception of their health status since discharge?: Improving (8/28/2023  8:48 AM)    Wrap Up  Call End Time: 0900 (8/28/2023  8:48 AM)

## 2023-09-08 ENCOUNTER — PATIENT OUTREACH (OUTPATIENT)
Dept: CARE COORDINATION | Facility: CLINIC | Age: 58
End: 2023-09-08
Payer: MEDICARE

## 2023-09-08 NOTE — PROGRESS NOTES
Call regarding appt. with PCP after hospitalization.She state that she saw her urologist on 09/06/23 and is doing well will see her pcp in the furture.  At time of outreach call the patient feels as if their condition has (improved since last visit.  Reviewed the PCP appointment with the pt and addressed any questions or concerns.

## 2023-09-12 PROBLEM — L66.8 OTHER CICATRICIAL ALOPECIA: Status: ACTIVE | Noted: 2020-07-16

## 2023-09-12 PROBLEM — L66.89 OTHER CICATRICIAL ALOPECIA: Status: ACTIVE | Noted: 2020-07-16

## 2023-09-12 PROBLEM — B35.3 TINEA PEDIS: Status: ACTIVE | Noted: 2020-07-16

## 2023-09-12 RX ORDER — ERGOCALCIFEROL 1.25 MG/1
50000 CAPSULE ORAL WEEKLY
COMMUNITY
Start: 2021-06-10 | End: 2024-03-20 | Stop reason: ALTCHOICE

## 2023-09-12 RX ORDER — CEPHALEXIN 500 MG/1
500 TABLET ORAL 4 TIMES DAILY
COMMUNITY
Start: 2023-01-24 | End: 2024-03-20 | Stop reason: ALTCHOICE

## 2023-09-12 RX ORDER — LEVOFLOXACIN 500 MG/1
1 TABLET, FILM COATED ORAL DAILY
COMMUNITY
Start: 2023-01-16 | End: 2023-10-30 | Stop reason: ALTCHOICE

## 2023-09-12 RX ORDER — ACETAMINOPHEN 325 MG/1
650 TABLET ORAL EVERY 6 HOURS PRN
Status: ON HOLD | COMMUNITY
End: 2023-11-27 | Stop reason: WASHOUT

## 2023-09-12 RX ORDER — ERGOCALCIFEROL 1.25 MG/1
50000 CAPSULE ORAL 3 TIMES WEEKLY
COMMUNITY
Start: 2021-06-10 | End: 2024-03-20 | Stop reason: ALTCHOICE

## 2023-09-12 RX ORDER — CEPHALEXIN 500 MG/1
CAPSULE ORAL DAILY
Status: ON HOLD | COMMUNITY
End: 2023-11-27 | Stop reason: WASHOUT

## 2023-09-12 RX ORDER — BACLOFEN 10 MG/1
.5-1 TABLET ORAL 3 TIMES DAILY PRN
COMMUNITY
Start: 2021-06-10 | End: 2024-03-20 | Stop reason: ALTCHOICE

## 2023-09-12 RX ORDER — NITROFURANTOIN 25; 75 MG/1; MG/1
1 CAPSULE ORAL 2 TIMES DAILY
COMMUNITY
Start: 2022-09-15 | End: 2024-03-20 | Stop reason: ALTCHOICE

## 2023-09-12 RX ORDER — ACETAMINOPHEN 500 MG
500 TABLET ORAL EVERY 6 HOURS PRN
COMMUNITY
End: 2024-05-10 | Stop reason: HOSPADM

## 2023-09-12 RX ORDER — ERTAPENEM 1 G/1
INJECTION, POWDER, LYOPHILIZED, FOR SOLUTION INTRAMUSCULAR; INTRAVENOUS
COMMUNITY
Start: 2023-08-25 | End: 2024-03-20 | Stop reason: ALTCHOICE

## 2023-09-15 ENCOUNTER — LAB (OUTPATIENT)
Dept: LAB | Facility: LAB | Age: 58
End: 2023-09-15
Payer: MEDICARE

## 2023-09-15 ENCOUNTER — OFFICE VISIT (OUTPATIENT)
Dept: PRIMARY CARE | Facility: CLINIC | Age: 58
End: 2023-09-15
Payer: MEDICARE

## 2023-09-15 VITALS
HEART RATE: 72 BPM | BODY MASS INDEX: 28.15 KG/M2 | OXYGEN SATURATION: 95 % | RESPIRATION RATE: 18 BRPM | WEIGHT: 174.4 LBS

## 2023-09-15 DIAGNOSIS — D64.9 ANEMIA, UNSPECIFIED TYPE: Primary | ICD-10-CM

## 2023-09-15 DIAGNOSIS — D64.9 ANEMIA, UNSPECIFIED TYPE: ICD-10-CM

## 2023-09-15 DIAGNOSIS — N39.0 URINARY TRACT INFECTION WITHOUT HEMATURIA, SITE UNSPECIFIED: ICD-10-CM

## 2023-09-15 DIAGNOSIS — B00.9 HSV INFECTION: ICD-10-CM

## 2023-09-15 DIAGNOSIS — Z00.00 ANNUAL PHYSICAL EXAM: ICD-10-CM

## 2023-09-15 LAB
ALANINE AMINOTRANSFERASE (SGPT) (U/L) IN SER/PLAS: 10 U/L (ref 7–45)
ALBUMIN (G/DL) IN SER/PLAS: 3.8 G/DL (ref 3.4–5)
ALKALINE PHOSPHATASE (U/L) IN SER/PLAS: 67 U/L (ref 33–110)
ANION GAP IN SER/PLAS: 16 MMOL/L (ref 10–20)
ASPARTATE AMINOTRANSFERASE (SGOT) (U/L) IN SER/PLAS: 15 U/L (ref 9–39)
BASOPHILS (10*3/UL) IN BLOOD BY AUTOMATED COUNT: 0.07 X10E9/L (ref 0–0.1)
BASOPHILS/100 LEUKOCYTES IN BLOOD BY AUTOMATED COUNT: 1.2 % (ref 0–2)
BILIRUBIN TOTAL (MG/DL) IN SER/PLAS: 0.4 MG/DL (ref 0–1.2)
CALCIUM (MG/DL) IN SER/PLAS: 9.7 MG/DL (ref 8.6–10.6)
CARBON DIOXIDE, TOTAL (MMOL/L) IN SER/PLAS: 22 MMOL/L (ref 21–32)
CHLORIDE (MMOL/L) IN SER/PLAS: 108 MMOL/L (ref 98–107)
CREATININE (MG/DL) IN SER/PLAS: 1.02 MG/DL (ref 0.5–1.05)
EOSINOPHILS (10*3/UL) IN BLOOD BY AUTOMATED COUNT: 0.28 X10E9/L (ref 0–0.7)
EOSINOPHILS/100 LEUKOCYTES IN BLOOD BY AUTOMATED COUNT: 4.7 % (ref 0–6)
ERYTHROCYTE DISTRIBUTION WIDTH (RATIO) BY AUTOMATED COUNT: 16.9 % (ref 11.5–14.5)
ERYTHROCYTE MEAN CORPUSCULAR HEMOGLOBIN CONCENTRATION (G/DL) BY AUTOMATED: 29.6 G/DL (ref 32–36)
ERYTHROCYTE MEAN CORPUSCULAR VOLUME (FL) BY AUTOMATED COUNT: 90 FL (ref 80–100)
ERYTHROCYTES (10*6/UL) IN BLOOD BY AUTOMATED COUNT: 3.88 X10E12/L (ref 4–5.2)
FERRITIN (UG/LL) IN SER/PLAS: 44 UG/L (ref 8–150)
GFR FEMALE: 64 ML/MIN/1.73M2
GLUCOSE (MG/DL) IN SER/PLAS: 82 MG/DL (ref 74–99)
HEMATOCRIT (%) IN BLOOD BY AUTOMATED COUNT: 35.1 % (ref 36–46)
HEMOGLOBIN (G/DL) IN BLOOD: 10.4 G/DL (ref 12–16)
IMMATURE GRANULOCYTES/100 LEUKOCYTES IN BLOOD BY AUTOMATED COUNT: 0.3 % (ref 0–0.9)
IRON (UG/DL) IN SER/PLAS: 18 UG/DL (ref 35–150)
IRON BINDING CAPACITY (UG/DL) IN SER/PLAS: 333 UG/DL (ref 240–445)
IRON SATURATION (%) IN SER/PLAS: 5 % (ref 25–45)
LEUKOCYTES (10*3/UL) IN BLOOD BY AUTOMATED COUNT: 5.9 X10E9/L (ref 4.4–11.3)
LYMPHOCYTES (10*3/UL) IN BLOOD BY AUTOMATED COUNT: 1.71 X10E9/L (ref 1.2–4.8)
LYMPHOCYTES/100 LEUKOCYTES IN BLOOD BY AUTOMATED COUNT: 29 % (ref 13–44)
MONOCYTES (10*3/UL) IN BLOOD BY AUTOMATED COUNT: 0.69 X10E9/L (ref 0.1–1)
MONOCYTES/100 LEUKOCYTES IN BLOOD BY AUTOMATED COUNT: 11.7 % (ref 2–10)
NEUTROPHILS (10*3/UL) IN BLOOD BY AUTOMATED COUNT: 3.13 X10E9/L (ref 1.2–7.7)
NEUTROPHILS/100 LEUKOCYTES IN BLOOD BY AUTOMATED COUNT: 53.1 % (ref 40–80)
NRBC (PER 100 WBCS) BY AUTOMATED COUNT: 0 /100 WBC (ref 0–0)
PLATELETS (10*3/UL) IN BLOOD AUTOMATED COUNT: 327 X10E9/L (ref 150–450)
POTASSIUM (MMOL/L) IN SER/PLAS: 3.6 MMOL/L (ref 3.5–5.3)
PROTEIN TOTAL: 7.1 G/DL (ref 6.4–8.2)
SODIUM (MMOL/L) IN SER/PLAS: 142 MMOL/L (ref 136–145)
UREA NITROGEN (MG/DL) IN SER/PLAS: 10 MG/DL (ref 6–23)

## 2023-09-15 PROCEDURE — 85025 COMPLETE CBC W/AUTO DIFF WBC: CPT

## 2023-09-15 PROCEDURE — 80053 COMPREHEN METABOLIC PANEL: CPT

## 2023-09-15 PROCEDURE — 99214 OFFICE O/P EST MOD 30 MIN: CPT | Performed by: INTERNAL MEDICINE

## 2023-09-15 PROCEDURE — 83540 ASSAY OF IRON: CPT

## 2023-09-15 PROCEDURE — 36415 COLL VENOUS BLD VENIPUNCTURE: CPT

## 2023-09-15 PROCEDURE — 82728 ASSAY OF FERRITIN: CPT

## 2023-09-15 PROCEDURE — 83550 IRON BINDING TEST: CPT

## 2023-09-15 PROCEDURE — 1036F TOBACCO NON-USER: CPT | Performed by: INTERNAL MEDICINE

## 2023-09-15 NOTE — PROGRESS NOTES
Subjective   Patient ID: Laurence Campbell is a 57 y.o. female who presents for Hospital Follow-up and Blood in Urine.    Hospital Follow up :    UTI w/ Hematuria    DOA-8/22/23    DOD- 8/27/23    HPI     Review of Systems      No Fever/chills/headaches/dizziness/chest pains/ shortness of breath/palpitations/Nausea/vomiting/diarrhea/ constipation/urine frequency/      Objective   Pulse 72   Resp 18   Wt 79.1 kg (174 lb 6.4 oz)   SpO2 95%   BMI 28.15 kg/m²     Physical Exam    No JVP elevation. No palpable Lymph Nodes. No Thyromegaly    CVS-NL S1/S2 . No MRG    Lungs-CTA. B/S= B/L    Abdomen-Soft, Non-tender. No masses or HSM    Extremities: No C/C/    LLE swelling-Chronic    Assessment/Plan     Hospital Follow up :    UTI    DOA-8/22/23    DOD- 8/27/23    Self Cath    Follow up with urology ( Dr Carbajal) next week for Cystoscopy    Anemia    Plan:    Labs    Iron supplement daily    Colace/Miralax    Continue with current Rx    Follow up with Urology re Cystoscopy    ID for evaluation of recurrent UTI    Follow up/ Call with any concerns    Follow up 3 months/PRN

## 2023-09-20 ENCOUNTER — HOSPITAL ENCOUNTER (OUTPATIENT)
Dept: DATA CONVERSION | Facility: HOSPITAL | Age: 58
Discharge: HOME | End: 2023-09-20

## 2023-09-20 DIAGNOSIS — N13.30 UNSPECIFIED HYDRONEPHROSIS: ICD-10-CM

## 2023-09-20 LAB
ALBUMIN SERPL-MCNC: 3.8 GM/DL (ref 3.5–5)
ALBUMIN/GLOB SERPL: 1.1 RATIO (ref 1.5–3)
ALP BLD-CCNC: 76 U/L (ref 35–125)
ALT SERPL-CCNC: 9 U/L (ref 5–40)
ANION GAP SERPL CALCULATED.3IONS-SCNC: 13 MMOL/L (ref 0–19)
ANTICOAGULANT: NORMAL
APTT PPP: 27.4 SEC (ref 22–32.5)
AST SERPL-CCNC: 13 U/L (ref 5–40)
BACTERIA SPEC CULT: NORMAL
BACTERIA UR QL AUTO: PRESENT
BASOPHILS # BLD AUTO: 0.04 K/UL (ref 0–0.22)
BASOPHILS NFR BLD AUTO: 0.6 % (ref 0–1)
BILIRUB SERPL-MCNC: 0.3 MG/DL (ref 0.1–1.2)
BILIRUB UR QL STRIP.AUTO: NEGATIVE
BUN SERPL-MCNC: 10 MG/DL (ref 8–25)
BUN/CREAT SERPL: 9.1 RATIO (ref 8–21)
CALCIUM SERPL-MCNC: 10.3 MG/DL (ref 8.5–10.4)
CC # UR: NORMAL /UL
CHLORIDE SERPL-SCNC: 104 MMOL/L (ref 97–107)
CLARITY UR: ABNORMAL
CO2 SERPL-SCNC: 23 MMOL/L (ref 24–31)
COLOR UR: YELLOW
CREAT SERPL-MCNC: 1.1 MG/DL (ref 0.4–1.6)
DEPRECATED RDW RBC AUTO: 50.4 FL (ref 37–54)
DIFFERENTIAL METHOD BLD: ABNORMAL
EOSINOPHIL # BLD AUTO: 0.19 K/UL (ref 0–0.45)
EOSINOPHIL NFR BLD: 3 % (ref 0–3)
ERYTHROCYTE [DISTWIDTH] IN BLOOD BY AUTOMATED COUNT: 16.2 % (ref 11.7–15)
GFR SERPL CREATININE-BSD FRML MDRD: 59 ML/MIN/1.73 M2
GLOBULIN SER-MCNC: 3.6 G/DL (ref 1.9–3.7)
GLUCOSE SERPL-MCNC: 105 MG/DL (ref 65–99)
GLUCOSE UR STRIP.AUTO-MCNC: NEGATIVE MG/DL
HCT VFR BLD AUTO: 32.5 % (ref 36–44)
HGB BLD-MCNC: 10.1 GM/DL (ref 12–15)
HGB UR QL STRIP.AUTO: ABNORMAL /HPF (ref 0–3)
HGB UR QL: ABNORMAL
IMM GRANULOCYTES # BLD AUTO: 0.02 K/UL (ref 0–0.1)
INR PPP: 1 (ref 0.86–1.16)
KETONES UR QL STRIP.AUTO: NEGATIVE
LEUKOCYTE ESTERASE UR QL STRIP.AUTO: ABNORMAL
LYMPHOCYTES # BLD AUTO: 2.52 K/UL (ref 1.2–3.2)
LYMPHOCYTES NFR BLD MANUAL: 39.9 % (ref 20–40)
MAGNESIUM SERPL-MCNC: 1.7 MG/DL (ref 1.6–3.1)
MCH RBC QN AUTO: 26 PG (ref 26–34)
MCHC RBC AUTO-ENTMCNC: 31.1 % (ref 31–37)
MCV RBC AUTO: 83.8 FL (ref 80–100)
MICRO URNS: ABNORMAL
MICROSCOPIC (UA): ABNORMAL
MONOCYTES # BLD AUTO: 0.72 K/UL (ref 0–0.8)
MONOCYTES NFR BLD MANUAL: 11.4 % (ref 0–8)
NEUTROPHILS # BLD AUTO: 2.82 K/UL
NEUTROPHILS # BLD AUTO: 2.82 K/UL (ref 1.8–7.7)
NEUTROPHILS.IMMATURE NFR BLD: 0.3 % (ref 0–1)
NEUTS SEG NFR BLD: 44.8 % (ref 50–70)
NITRITE UR QL STRIP.AUTO: POSITIVE
NT-PROBNP SERPL-MCNC: 103 PG/ML (ref 0–226)
PH UR STRIP.AUTO: 6.5 [PH] (ref 4.6–8)
PLATELET # BLD AUTO: 321 K/UL (ref 150–450)
PMV BLD AUTO: 9.4 CU (ref 7–12.6)
POTASSIUM SERPL-SCNC: 3.8 MMOL/L (ref 3.4–5.1)
PROT SERPL-MCNC: 7.4 G/DL (ref 5.9–7.9)
PROT UR STRIP.AUTO-MCNC: ABNORMAL MG/DL
PROTHROMBIN TIME: 9.8 SEC (ref 9.3–12.7)
RBC # BLD AUTO: 3.88 M/UL (ref 4–4.9)
REPORT STATUS -LH SQ DATA CONVERSION: NORMAL
SERVICE CMNT-IMP: NORMAL
SODIUM SERPL-SCNC: 140 MMOL/L (ref 133–145)
SP GR UR STRIP.AUTO: <=1.005 (ref 1–1.03)
SPECIMEN SOURCE: NORMAL
URINE CULTURE: ABNORMAL
UROBILINOGEN UR QL STRIP.AUTO: 0.2 MG/DL (ref 0–1)
WBC # BLD AUTO: 6.3 K/UL (ref 4.5–11)
WBC #/AREA URNS AUTO: ABNORMAL /HPF (ref 0–3)

## 2023-09-22 ENCOUNTER — TELEPHONE (OUTPATIENT)
Dept: PRIMARY CARE | Facility: CLINIC | Age: 58
End: 2023-09-22
Payer: MEDICARE

## 2023-09-22 DIAGNOSIS — D64.9 ANEMIA, UNSPECIFIED TYPE: ICD-10-CM

## 2023-09-22 NOTE — TELEPHONE ENCOUNTER
Spoke to patient and informed her of her results from [facility]. She was in full understanding and also made me aware that she is currently in the hospital she had a reaction after her procedure for her cystoscopy. Pt was schedule for hospital follow up.      Lianna Sandhu MA

## 2023-09-25 ENCOUNTER — PATIENT OUTREACH (OUTPATIENT)
Dept: CARE COORDINATION | Facility: CLINIC | Age: 58
End: 2023-09-25
Payer: MEDICARE

## 2023-09-25 NOTE — PROGRESS NOTES
Call placed regarding one month post discharge follow up call.  At time of outreach call the patient feels as if their condition has worsened/ since initial visit with PCP or specialist.She is in the hospital and is being released today  Questions or concerns regarding recovery period addressed at this time. (Individualize as needed if questions arise)  Reviewed any PCP or specialists progress notes/labs/radiology reports if applicable and addressed any questions or concerns.

## 2023-09-26 ENCOUNTER — PATIENT OUTREACH (OUTPATIENT)
Dept: CARE COORDINATION | Facility: CLINIC | Age: 58
End: 2023-09-26
Payer: MEDICARE

## 2023-09-26 RX ORDER — CIPROFLOXACIN 500 MG/5ML
500 KIT ORAL 2 TIMES DAILY
COMMUNITY
End: 2023-10-30 | Stop reason: ALTCHOICE

## 2023-09-26 NOTE — PROGRESS NOTES
Discharge Facility:[facility]  Discharge Diagnosis:Sepsis UTI  Admission Date:09/22/23  Discharge Date: 09/25/23    PCP Appointment Date:  Specialist Appointment Date:   Hospital Encounter and Summary: Linked   See discharge assessment below for further details  Engagement  Call Start Time: 0836 (9/26/2023  8:36 AM)    Medications  Medications reviewed with patient/caregiver?: Yes (new meds only cipro 500mg) (9/26/2023  8:36 AM)  Is the patient having any side effects they believe may be caused by any medication additions or changes?: No (9/26/2023  8:36 AM)  Does the patient have all medications ordered at discharge?: Yes (9/26/2023  8:36 AM)  Is the patient taking all medications as directed (includes completed medication regime)?: Yes (9/26/2023  8:36 AM)    Appointments  Does the patient have a primary care provider?: Yes (9/26/2023  8:36 AM)  Care Management Interventions: Advised patient to make appointment (9/26/2023  8:36 AM)    Self Management  Has home health visited the patient within 72 hours of discharge?: Not applicable (9/26/2023  8:36 AM)  Has all Durable Medical Equipment (DME) been delivered?: No (9/26/2023  8:36 AM)    Patient Teaching  Does the patient have access to their discharge instructions?: Yes (9/26/2023  8:36 AM)  Care Management Interventions: Reviewed instructions with patient (9/26/2023  8:36 AM)  What is the patient's perception of their health status since discharge?: Improving (9/26/2023  8:36 AM)

## 2023-10-09 ENCOUNTER — PATIENT OUTREACH (OUTPATIENT)
Dept: CARE COORDINATION | Facility: CLINIC | Age: 58
End: 2023-10-09
Payer: MEDICARE

## 2023-10-11 PROBLEM — I10 HYPERTENSION: Status: ACTIVE | Noted: 2023-10-11

## 2023-10-11 PROBLEM — M85.80 OSTEOPENIA: Status: ACTIVE | Noted: 2023-10-11

## 2023-10-11 PROBLEM — E55.9 AVITAMINOSIS D: Status: ACTIVE | Noted: 2023-10-11

## 2023-10-11 PROBLEM — E83.42 HYPOMAGNESEMIA: Status: ACTIVE | Noted: 2023-10-11

## 2023-10-11 PROBLEM — R59.0 AXILLARY LYMPHADENOPATHY: Status: ACTIVE | Noted: 2023-10-11

## 2023-10-11 PROBLEM — E78.5 ELEVATED LIPIDS: Status: ACTIVE | Noted: 2023-10-11

## 2023-10-11 PROBLEM — I89.0 LYMPHEDEMA: Status: ACTIVE | Noted: 2023-10-11

## 2023-10-11 PROBLEM — L03.90 CELLULITIS: Status: ACTIVE | Noted: 2023-10-11

## 2023-10-11 PROBLEM — G62.9 NEUROPATHY: Status: ACTIVE | Noted: 2023-10-11

## 2023-10-11 PROBLEM — M54.16 LUMBAR NEURITIS: Status: ACTIVE | Noted: 2023-10-11

## 2023-10-11 PROBLEM — N31.9 NEUROGENIC BLADDER: Status: ACTIVE | Noted: 2023-10-11

## 2023-10-11 PROBLEM — E87.6 HYPOKALEMIA: Status: ACTIVE | Noted: 2023-10-11

## 2023-10-11 PROBLEM — L65.9 HAIR LOSS: Status: ACTIVE | Noted: 2023-10-11

## 2023-10-11 PROBLEM — M54.12 CERVICAL NEURITIS: Status: ACTIVE | Noted: 2023-10-11

## 2023-10-11 PROBLEM — E66.9 OBESITY: Status: ACTIVE | Noted: 2023-10-11

## 2023-10-11 PROBLEM — D50.9 IRON DEFICIENCY ANEMIA: Status: ACTIVE | Noted: 2023-10-11

## 2023-10-11 RX ORDER — ALFUZOSIN HYDROCHLORIDE 10 MG/1
10 TABLET, EXTENDED RELEASE ORAL DAILY
COMMUNITY

## 2023-10-11 RX ORDER — CALCIUM CARBONATE/VITAMIN D3 250-3.125
1 TABLET ORAL
Status: ON HOLD | COMMUNITY
End: 2023-11-27 | Stop reason: WASHOUT

## 2023-10-11 RX ORDER — DOCUSATE SODIUM 100 MG/1
100 CAPSULE, LIQUID FILLED ORAL 2 TIMES DAILY
Status: ON HOLD | COMMUNITY
End: 2023-11-27 | Stop reason: WASHOUT

## 2023-10-11 RX ORDER — VALACYCLOVIR HYDROCHLORIDE 500 MG/1
500 TABLET, FILM COATED ORAL 2 TIMES DAILY
Status: ON HOLD | COMMUNITY
End: 2023-11-27 | Stop reason: WASHOUT

## 2023-10-11 RX ORDER — ACETAMINOPHEN 500 MG
TABLET ORAL EVERY 6 HOURS PRN
Status: ON HOLD | COMMUNITY
End: 2023-11-27 | Stop reason: WASHOUT

## 2023-10-11 RX ORDER — FERROUS GLUCONATE 325 MG
38 TABLET ORAL
COMMUNITY
End: 2024-01-29 | Stop reason: SDUPTHER

## 2023-10-12 ENCOUNTER — OFFICE VISIT (OUTPATIENT)
Dept: ORTHOPEDIC SURGERY | Facility: CLINIC | Age: 58
End: 2023-10-12
Payer: MEDICARE

## 2023-10-12 VITALS — HEIGHT: 67 IN

## 2023-10-12 DIAGNOSIS — M65.4 DE QUERVAIN'S TENOSYNOVITIS: Primary | ICD-10-CM

## 2023-10-12 PROCEDURE — 2500000004 HC RX 250 GENERAL PHARMACY W/ HCPCS (ALT 636 FOR OP/ED): Performed by: ORTHOPAEDIC SURGERY

## 2023-10-12 PROCEDURE — 99214 OFFICE O/P EST MOD 30 MIN: CPT | Performed by: ORTHOPAEDIC SURGERY

## 2023-10-12 PROCEDURE — 2500000005 HC RX 250 GENERAL PHARMACY W/O HCPCS: Performed by: ORTHOPAEDIC SURGERY

## 2023-10-12 PROCEDURE — 20550 NJX 1 TENDON SHEATH/LIGAMENT: CPT | Mod: LT | Performed by: ORTHOPAEDIC SURGERY

## 2023-10-12 RX ORDER — CEPHALEXIN 500 MG/1
500 CAPSULE ORAL 3 TIMES DAILY
Status: ON HOLD | COMMUNITY
End: 2023-11-27 | Stop reason: WASHOUT

## 2023-10-12 RX ORDER — ACYCLOVIR 400 MG/1
400 TABLET ORAL 2 TIMES DAILY
Status: ON HOLD | COMMUNITY
End: 2023-11-27 | Stop reason: WASHOUT

## 2023-10-12 RX ORDER — LIDOCAINE HYDROCHLORIDE 10 MG/ML
0.5 INJECTION INFILTRATION; PERINEURAL
Status: COMPLETED | OUTPATIENT
Start: 2023-10-12 | End: 2023-10-12

## 2023-10-12 RX ORDER — TRIAMCINOLONE ACETONIDE 40 MG/ML
20 INJECTION, SUSPENSION INTRA-ARTICULAR; INTRAMUSCULAR
Status: COMPLETED | OUTPATIENT
Start: 2023-10-12 | End: 2023-10-12

## 2023-10-12 RX ADMIN — TRIAMCINOLONE ACETONIDE 20 MG: 40 INJECTION, SUSPENSION INTRA-ARTICULAR; INTRAMUSCULAR at 17:32

## 2023-10-12 RX ADMIN — LIDOCAINE HYDROCHLORIDE 0.5 ML: 10 INJECTION, SOLUTION INFILTRATION; PERINEURAL at 17:32

## 2023-10-12 ASSESSMENT — PAIN DESCRIPTION - DESCRIPTORS: DESCRIPTORS: ACHING;SHARP;THROBBING

## 2023-10-12 ASSESSMENT — PAIN SCALES - GENERAL: PAINLEVEL_OUTOF10: 8

## 2023-10-12 ASSESSMENT — PAIN - FUNCTIONAL ASSESSMENT: PAIN_FUNCTIONAL_ASSESSMENT: 0-10

## 2023-10-12 NOTE — PROGRESS NOTES
The patient is a 57-year-old right-hand dominant female who was previously treated by Dr. Link for left wrist De Quervain's tenosynovitis. She has had three prior injections, most recently in February. She has had good relief from the injections but they wear off. Her symptoms started to recur over the last month and she presents today for a repeat injection and to discuss more definitive treatment options. Her prior visits are documented on a different MRN compared to today's visit.    Past medical history, medications, allergies, surgical history and review of systems are reviewed and otherwise unchanged when compared to last visit on 2/21/2023 with Dr. Link.    Physical Examination Findings:  Constitutional: Appears well-developed and well-nourished.  Head: Normocephalic and atraumatic.  Eyes: Pupils are equal and round.  Cardiovascular: Intact distal pulses.   Respiratory: Effort normal. No respiratory distress.  Neurologic: Alert and oriented to person, place, and time.  Skin: Skin is warm and dry.  Hematologic / Lymphatic: No lymphedema, lymphangitis.  Psychiatric: Normal mood and affect. Behavior is normal.   Musculoskeletal: Left hand examination reveals focal swelling over the radial styloid with findings consistent for a ganglion cyst at the first compartment. Marked tenderness over the first compartment. Positive Finkelstein maneuver.     Impression: Left wrist De Quervain's tenosynovitis     Plan: I have given her an injection today but I have advised her that repeat injections is not a good long-term plan. Recommendations are that she schedule surgery to definitively take care of this after the new year. She will call to schedule left wrist first dorsal compartment release under local anesthesia after the holidays.    Hand / UE Inj/Asp: L extensor compartment 1 for de Quervain's tenosynovitis on 10/12/2023 5:32 PM  Indications: tendon swelling and pain  Details: 25 G needle, radial  approach  Medications: 20 mg triamcinolone acetonide 40 mg/mL; 0.5 mL lidocaine 10 mg/mL (1 %)  Outcome: tolerated well, no immediate complications  Procedure, treatment alternatives, risks and benefits explained, specific risks discussed. Immediately prior to procedure a time out was called to verify the correct patient, procedure, equipment, support staff and site/side marked as required. Patient was prepped and draped in the usual sterile fashion.       We discussed risks, benefits, alternatives and anticipated post op course including time away from work and activities following surgical treatment for the patient's condition. This is major surgery with identifiable risks. No guarantees for success have been provided. The patient has expressed understanding and has elected to pursue operative treatment.        For Surgical Planning:  Diagnosis: Left wrist de Quervain tenosynovitis  Procedure: Left wrist first dorsal compartment release  CPT: 69227 low  Anesthesia: Lonely  Duration: 5 minutes  Special Equipment Needed: None  Medical Notes / PM / DM / PAT needed?:  N/A  [place] [facility]  Initial Post Operative Visit: 2 weeks        Skyler Sharma MD    [employer]  Department of Orthopaedic Surgery  Chief of Hand and Upper Extremity Surgery  [facility]    By signing my name below, I, Karol Carranza, attest that this documentation has been prepared under the direction and in the presence of Dr. Skyler Sharma.   All medical record entries made by the Scribe were at my direction and personally dictated by me. I have reviewed the chart and agree that the record accurately reflects my personal performance of the history, physical exam, discussion and plan.

## 2023-10-16 ENCOUNTER — APPOINTMENT (OUTPATIENT)
Dept: OBSTETRICS AND GYNECOLOGY | Facility: CLINIC | Age: 58
End: 2023-10-16
Payer: MEDICARE

## 2023-10-17 ENCOUNTER — APPOINTMENT (OUTPATIENT)
Dept: ORTHOPEDIC SURGERY | Facility: CLINIC | Age: 58
End: 2023-10-17
Payer: MEDICARE

## 2023-10-23 ENCOUNTER — PATIENT OUTREACH (OUTPATIENT)
Dept: CARE COORDINATION | Facility: CLINIC | Age: 58
End: 2023-10-23
Payer: MEDICARE

## 2023-10-30 ENCOUNTER — OFFICE VISIT (OUTPATIENT)
Dept: PRIMARY CARE | Facility: CLINIC | Age: 58
End: 2023-10-30
Payer: MEDICARE

## 2023-10-30 VITALS
OXYGEN SATURATION: 100 % | WEIGHT: 167.4 LBS | RESPIRATION RATE: 16 BRPM | HEART RATE: 65 BPM | BODY MASS INDEX: 26.22 KG/M2

## 2023-10-30 DIAGNOSIS — N39.0 URINARY TRACT INFECTION ASSOCIATED WITH CATHETERIZATION OF URINARY TRACT, UNSPECIFIED INDWELLING URINARY CATHETER TYPE, SEQUELA: Primary | ICD-10-CM

## 2023-10-30 DIAGNOSIS — T83.511S URINARY TRACT INFECTION ASSOCIATED WITH CATHETERIZATION OF URINARY TRACT, UNSPECIFIED INDWELLING URINARY CATHETER TYPE, SEQUELA: Primary | ICD-10-CM

## 2023-10-30 PROBLEM — G95.89 OTHER SPECIFIED DISEASES OF SPINAL CORD (MULTI): Status: RESOLVED | Noted: 2018-04-23 | Resolved: 2023-10-30

## 2023-10-30 PROCEDURE — 99214 OFFICE O/P EST MOD 30 MIN: CPT | Performed by: INTERNAL MEDICINE

## 2023-10-30 PROCEDURE — 3074F SYST BP LT 130 MM HG: CPT | Performed by: INTERNAL MEDICINE

## 2023-10-30 PROCEDURE — 1036F TOBACCO NON-USER: CPT | Performed by: INTERNAL MEDICINE

## 2023-10-30 PROCEDURE — 3078F DIAST BP <80 MM HG: CPT | Performed by: INTERNAL MEDICINE

## 2023-10-30 NOTE — PROGRESS NOTES
Subjective   Patient ID: Laurence Campbell is a 57 y.o. female who presents for Hospital Follow-up.    Discharged  9/25/2023 with Uro -Sepsis ( Pseudomonas )-Dr Richard Carbajal     HPI     Review of Systems    No Fever/chills/headaches/dizziness/chest pains/ cough/ shortness of breath/palpitations/Nausea/vomiting/diarrhea/ constipation/urine frequency/blood in urine.    Self Cath    Objective   Pulse 65   Resp 16   Wt 75.9 kg (167 lb 6.4 oz)   SpO2 100%   BMI 26.22 kg/m²     120/80    Physical Exam    No JVP elevation. No palpable Lymph Nodes. No Thyromegaly    HEENT- Negative    CVS-NL S1/S2 . No MRG    Lungs-CTA. B/S= B/L    Abdomen-Soft, Non-tender. No masses or HSM    Extremities: No C/C/    LLE     Gait- Cane      Assessment/Plan     Urosepsis d/t Pseudomonas -Self Cath-Follow up with Dr Carbajal in Urology- Continue with current Rx     H/o Lumbar stenosis- S/p MARY- Resolved    Osteoporosis-Continue with Prolia Rx    Continue with current Rx    Follow up/ Call with any concerns    Follow up 3 months/PRN

## 2023-11-17 ENCOUNTER — LAB (OUTPATIENT)
Dept: LAB | Facility: LAB | Age: 58
End: 2023-11-17
Payer: MEDICARE

## 2023-11-17 DIAGNOSIS — N30.00 ACUTE CYSTITIS WITHOUT HEMATURIA: Primary | ICD-10-CM

## 2023-11-17 LAB
APPEARANCE UR: ABNORMAL
BILIRUB UR STRIP.AUTO-MCNC: NEGATIVE MG/DL
COLOR UR: ABNORMAL
GLUCOSE UR STRIP.AUTO-MCNC: ABNORMAL MG/DL
HOLD SPECIMEN: NORMAL
KETONES UR STRIP.AUTO-MCNC: ABNORMAL MG/DL
LEUKOCYTE ESTERASE UR QL STRIP.AUTO: ABNORMAL
MUCOUS THREADS #/AREA URNS AUTO: ABNORMAL /LPF
NITRITE UR QL STRIP.AUTO: POSITIVE
PH UR STRIP.AUTO: 7 [PH]
PROT UR STRIP.AUTO-MCNC: ABNORMAL MG/DL
RBC # UR STRIP.AUTO: ABNORMAL /UL
RBC #/AREA URNS AUTO: >20 /HPF
SP GR UR STRIP.AUTO: 1.01
SQUAMOUS #/AREA URNS AUTO: ABNORMAL /HPF
UROBILINOGEN UR STRIP.AUTO-MCNC: 4 MG/DL
WBC #/AREA URNS AUTO: >50 /HPF

## 2023-11-17 PROCEDURE — 81001 URINALYSIS AUTO W/SCOPE: CPT

## 2023-11-27 ENCOUNTER — HOSPITAL ENCOUNTER (INPATIENT)
Facility: HOSPITAL | Age: 58
LOS: 8 days | Discharge: HOME | DRG: 812 | End: 2023-12-05
Attending: EMERGENCY MEDICINE | Admitting: EMERGENCY MEDICINE
Payer: MEDICARE

## 2023-11-27 DIAGNOSIS — D50.0 IRON DEFICIENCY ANEMIA DUE TO CHRONIC BLOOD LOSS: ICD-10-CM

## 2023-11-27 DIAGNOSIS — D64.9 SEVERE ANEMIA: Primary | ICD-10-CM

## 2023-11-27 DIAGNOSIS — M65.4 DE QUERVAIN'S TENOSYNOVITIS: ICD-10-CM

## 2023-11-27 LAB
ABO GROUP (TYPE) IN BLOOD: NORMAL
ABO GROUP (TYPE) IN BLOOD: NORMAL
ALBUMIN SERPL BCP-MCNC: 3.4 G/DL (ref 3.4–5)
ALP SERPL-CCNC: 37 U/L (ref 33–110)
ALT SERPL W P-5'-P-CCNC: 6 U/L (ref 7–45)
ANION GAP SERPL CALC-SCNC: 16 MMOL/L (ref 10–20)
ANTIBODY SCREEN: NORMAL
APPEARANCE UR: ABNORMAL
AST SERPL W P-5'-P-CCNC: 10 U/L (ref 9–39)
BACTERIA #/AREA URNS AUTO: ABNORMAL /HPF
BASOPHILS # BLD AUTO: 0.01 X10*3/UL (ref 0–0.1)
BASOPHILS NFR BLD AUTO: 0.1 %
BILIRUB SERPL-MCNC: 0.4 MG/DL (ref 0–1.2)
BILIRUB UR STRIP.AUTO-MCNC: NEGATIVE MG/DL
BUN SERPL-MCNC: 13 MG/DL (ref 6–23)
CALCIUM SERPL-MCNC: 9.2 MG/DL (ref 8.6–10.3)
CHLORIDE SERPL-SCNC: 106 MMOL/L (ref 98–107)
CO2 SERPL-SCNC: 19 MMOL/L (ref 21–32)
COLOR UR: ABNORMAL
CREAT SERPL-MCNC: 1.02 MG/DL (ref 0.5–1.05)
EOSINOPHIL # BLD AUTO: 0.02 X10*3/UL (ref 0–0.7)
EOSINOPHIL NFR BLD AUTO: 0.3 %
ERYTHROCYTE [DISTWIDTH] IN BLOOD BY AUTOMATED COUNT: 23.7 % (ref 11.5–14.5)
GFR SERPL CREATININE-BSD FRML MDRD: 64 ML/MIN/1.73M*2
GLUCOSE SERPL-MCNC: 146 MG/DL (ref 74–99)
GLUCOSE UR STRIP.AUTO-MCNC: ABNORMAL MG/DL
HCT VFR BLD AUTO: 13.4 % (ref 36–46)
HCT VFR BLD AUTO: 14.2 % (ref 36–46)
HGB BLD-MCNC: 3.8 G/DL (ref 12–16)
HGB BLD-MCNC: 4.1 G/DL (ref 12–16)
HOLD SPECIMEN: NORMAL
IMM GRANULOCYTES # BLD AUTO: 0.06 X10*3/UL (ref 0–0.7)
IMM GRANULOCYTES NFR BLD AUTO: 0.8 % (ref 0–0.9)
IRON SATN MFR SERPL: ABNORMAL %
IRON SERPL-MCNC: <20 UG/DL (ref 30–160)
KETONES UR STRIP.AUTO-MCNC: ABNORMAL MG/DL
LACTATE SERPL-SCNC: 3 MMOL/L (ref 0.4–2)
LACTATE SERPL-SCNC: 3.5 MMOL/L (ref 0.4–2)
LEUKOCYTE ESTERASE UR QL STRIP.AUTO: ABNORMAL
LYMPHOCYTES # BLD AUTO: 1.26 X10*3/UL (ref 1.2–4.8)
LYMPHOCYTES NFR BLD AUTO: 16.5 %
MCH RBC QN AUTO: 25.6 PG (ref 26–34)
MCHC RBC AUTO-ENTMCNC: 28.9 G/DL (ref 32–36)
MCV RBC AUTO: 89 FL (ref 80–100)
MONOCYTES # BLD AUTO: 0.76 X10*3/UL (ref 0.1–1)
MONOCYTES NFR BLD AUTO: 10 %
NEUTROPHILS # BLD AUTO: 5.51 X10*3/UL (ref 1.2–7.7)
NEUTROPHILS NFR BLD AUTO: 72.3 %
NITRITE UR QL STRIP.AUTO: POSITIVE
NRBC BLD-RTO: ABNORMAL /100{WBCS}
PH UR STRIP.AUTO: 7.5 [PH]
PLATELET # BLD AUTO: 358 X10*3/UL (ref 150–450)
POTASSIUM SERPL-SCNC: 3.5 MMOL/L (ref 3.5–5.3)
PROT SERPL-MCNC: 6 G/DL (ref 6.4–8.2)
PROT UR STRIP.AUTO-MCNC: ABNORMAL MG/DL
RBC # BLD AUTO: 1.6 X10*6/UL (ref 4–5.2)
RBC # UR STRIP.AUTO: ABNORMAL /UL
RBC #/AREA URNS AUTO: >20 /HPF
RH FACTOR (ANTIGEN D): NORMAL
RH FACTOR (ANTIGEN D): NORMAL
SODIUM SERPL-SCNC: 137 MMOL/L (ref 136–145)
SP GR UR STRIP.AUTO: 1.02
SQUAMOUS #/AREA URNS AUTO: ABNORMAL /HPF
TIBC SERPL-MCNC: ABNORMAL UG/DL
UIBC SERPL-MCNC: 232 UG/DL (ref 110–370)
UROBILINOGEN UR STRIP.AUTO-MCNC: 2 MG/DL
WBC # BLD AUTO: 7.6 X10*3/UL (ref 4.4–11.3)
WBC #/AREA URNS AUTO: ABNORMAL /HPF

## 2023-11-27 PROCEDURE — 86901 BLOOD TYPING SEROLOGIC RH(D): CPT | Performed by: EMERGENCY MEDICINE

## 2023-11-27 PROCEDURE — 86920 COMPATIBILITY TEST SPIN: CPT

## 2023-11-27 PROCEDURE — 81001 URINALYSIS AUTO W/SCOPE: CPT | Performed by: EMERGENCY MEDICINE

## 2023-11-27 PROCEDURE — 83540 ASSAY OF IRON: CPT | Performed by: EMERGENCY MEDICINE

## 2023-11-27 PROCEDURE — 80053 COMPREHEN METABOLIC PANEL: CPT | Performed by: EMERGENCY MEDICINE

## 2023-11-27 PROCEDURE — 36415 COLL VENOUS BLD VENIPUNCTURE: CPT | Performed by: EMERGENCY MEDICINE

## 2023-11-27 PROCEDURE — 83605 ASSAY OF LACTIC ACID: CPT | Performed by: EMERGENCY MEDICINE

## 2023-11-27 PROCEDURE — 2500000004 HC RX 250 GENERAL PHARMACY W/ HCPCS (ALT 636 FOR OP/ED): Performed by: EMERGENCY MEDICINE

## 2023-11-27 PROCEDURE — P9040 RBC LEUKOREDUCED IRRADIATED: HCPCS

## 2023-11-27 PROCEDURE — 2500000004 HC RX 250 GENERAL PHARMACY W/ HCPCS (ALT 636 FOR OP/ED): Performed by: PHYSICIAN ASSISTANT

## 2023-11-27 PROCEDURE — 87086 URINE CULTURE/COLONY COUNT: CPT | Mod: BEALAB | Performed by: EMERGENCY MEDICINE

## 2023-11-27 PROCEDURE — 85018 HEMOGLOBIN: CPT | Performed by: EMERGENCY MEDICINE

## 2023-11-27 PROCEDURE — 1200000002 HC GENERAL ROOM WITH TELEMETRY DAILY

## 2023-11-27 PROCEDURE — 30233N1 TRANSFUSION OF NONAUTOLOGOUS RED BLOOD CELLS INTO PERIPHERAL VEIN, PERCUTANEOUS APPROACH: ICD-10-PCS | Performed by: EMERGENCY MEDICINE

## 2023-11-27 PROCEDURE — 99285 EMERGENCY DEPT VISIT HI MDM: CPT | Performed by: EMERGENCY MEDICINE

## 2023-11-27 PROCEDURE — 85025 COMPLETE CBC W/AUTO DIFF WBC: CPT | Performed by: EMERGENCY MEDICINE

## 2023-11-27 PROCEDURE — 36430 TRANSFUSION BLD/BLD COMPNT: CPT

## 2023-11-27 RX ORDER — ACETAMINOPHEN 160 MG/5ML
650 SOLUTION ORAL EVERY 4 HOURS PRN
Status: DISCONTINUED | OUTPATIENT
Start: 2023-11-27 | End: 2023-12-05 | Stop reason: HOSPADM

## 2023-11-27 RX ORDER — ACETAMINOPHEN 650 MG/1
650 SUPPOSITORY RECTAL EVERY 4 HOURS PRN
Status: DISCONTINUED | OUTPATIENT
Start: 2023-11-27 | End: 2023-12-05 | Stop reason: HOSPADM

## 2023-11-27 RX ORDER — BENZONATATE 100 MG/1
100 CAPSULE ORAL 3 TIMES DAILY PRN
Status: DISCONTINUED | OUTPATIENT
Start: 2023-11-27 | End: 2023-12-05 | Stop reason: HOSPADM

## 2023-11-27 RX ORDER — POLYETHYLENE GLYCOL 3350 17 G/17G
17 POWDER, FOR SOLUTION ORAL DAILY
Status: DISCONTINUED | OUTPATIENT
Start: 2023-11-27 | End: 2023-12-05 | Stop reason: HOSPADM

## 2023-11-27 RX ORDER — ACETAMINOPHEN 325 MG/1
650 TABLET ORAL EVERY 4 HOURS PRN
Status: DISCONTINUED | OUTPATIENT
Start: 2023-11-27 | End: 2023-12-05 | Stop reason: HOSPADM

## 2023-11-27 RX ORDER — ONDANSETRON HYDROCHLORIDE 2 MG/ML
4 INJECTION, SOLUTION INTRAVENOUS EVERY 8 HOURS PRN
Status: DISCONTINUED | OUTPATIENT
Start: 2023-11-27 | End: 2023-12-05 | Stop reason: HOSPADM

## 2023-11-27 RX ORDER — SODIUM CHLORIDE, SODIUM LACTATE, POTASSIUM CHLORIDE, CALCIUM CHLORIDE 600; 310; 30; 20 MG/100ML; MG/100ML; MG/100ML; MG/100ML
125 INJECTION, SOLUTION INTRAVENOUS CONTINUOUS
Status: ACTIVE | OUTPATIENT
Start: 2023-11-27 | End: 2023-11-28

## 2023-11-27 RX ORDER — ALUMINUM HYDROXIDE, MAGNESIUM HYDROXIDE, AND SIMETHICONE 1200; 120; 1200 MG/30ML; MG/30ML; MG/30ML
20 SUSPENSION ORAL EVERY 4 HOURS PRN
Status: DISCONTINUED | OUTPATIENT
Start: 2023-11-27 | End: 2023-12-05 | Stop reason: HOSPADM

## 2023-11-27 RX ORDER — TALC
3 POWDER (GRAM) TOPICAL DAILY PRN
Status: DISCONTINUED | OUTPATIENT
Start: 2023-11-27 | End: 2023-12-05 | Stop reason: HOSPADM

## 2023-11-27 RX ORDER — LEVOFLOXACIN 5 MG/ML
500 INJECTION, SOLUTION INTRAVENOUS EVERY 24 HOURS
Status: DISCONTINUED | OUTPATIENT
Start: 2023-11-27 | End: 2023-11-29

## 2023-11-27 RX ORDER — ONDANSETRON 4 MG/1
4 TABLET, ORALLY DISINTEGRATING ORAL EVERY 8 HOURS PRN
Status: DISCONTINUED | OUTPATIENT
Start: 2023-11-27 | End: 2023-12-05 | Stop reason: HOSPADM

## 2023-11-27 RX ORDER — FERROUS SULFATE 325(65) MG
32.5 TABLET ORAL
Status: DISCONTINUED | OUTPATIENT
Start: 2023-11-28 | End: 2023-12-05 | Stop reason: HOSPADM

## 2023-11-27 RX ADMIN — SODIUM CHLORIDE, SODIUM LACTATE, POTASSIUM CHLORIDE, AND CALCIUM CHLORIDE 100 ML/HR: 600; 310; 30; 20 INJECTION, SOLUTION INTRAVENOUS at 16:08

## 2023-11-27 RX ADMIN — POLYETHYLENE GLYCOL 3350 17 G: 17 POWDER, FOR SOLUTION ORAL at 16:08

## 2023-11-27 RX ADMIN — LEVOFLOXACIN 500 MG: 500 INJECTION, SOLUTION INTRAVENOUS at 16:08

## 2023-11-27 RX ADMIN — SODIUM CHLORIDE 500 ML: 900 INJECTION, SOLUTION INTRAVENOUS at 12:43

## 2023-11-27 RX ADMIN — SODIUM CHLORIDE 500 ML: 900 INJECTION, SOLUTION INTRAVENOUS at 11:12

## 2023-11-27 SDOH — SOCIAL STABILITY: SOCIAL NETWORK: HOW OFTEN DO YOU GET TOGETHER WITH FRIENDS OR RELATIVES?: MORE THAN THREE TIMES A WEEK

## 2023-11-27 SDOH — ECONOMIC STABILITY: FOOD INSECURITY: WITHIN THE PAST 12 MONTHS, THE FOOD YOU BOUGHT JUST DIDN'T LAST AND YOU DIDN'T HAVE MONEY TO GET MORE.: NEVER TRUE

## 2023-11-27 SDOH — HEALTH STABILITY: MENTAL HEALTH: HOW OFTEN DO YOU HAVE A DRINK CONTAINING ALCOHOL?: MONTHLY OR LESS

## 2023-11-27 SDOH — ECONOMIC STABILITY: HOUSING INSECURITY: IN THE LAST 12 MONTHS, HOW MANY PLACES HAVE YOU LIVED?: 1

## 2023-11-27 SDOH — SOCIAL STABILITY: SOCIAL INSECURITY: HAS ANYONE EVER THREATENED TO HURT YOUR FAMILY OR YOUR PETS?: NO

## 2023-11-27 SDOH — SOCIAL STABILITY: SOCIAL NETWORK
DO YOU BELONG TO ANY CLUBS OR ORGANIZATIONS SUCH AS CHURCH GROUPS UNIONS, FRATERNAL OR ATHLETIC GROUPS, OR SCHOOL GROUPS?: YES

## 2023-11-27 SDOH — SOCIAL STABILITY: SOCIAL INSECURITY
WITHIN THE LAST YEAR, HAVE TO BEEN RAPED OR FORCED TO HAVE ANY KIND OF SEXUAL ACTIVITY BY YOUR PARTNER OR EX-PARTNER?: NO

## 2023-11-27 SDOH — HEALTH STABILITY: PHYSICAL HEALTH: ON AVERAGE, HOW MANY DAYS PER WEEK DO YOU ENGAGE IN MODERATE TO STRENUOUS EXERCISE (LIKE A BRISK WALK)?: 0 DAYS

## 2023-11-27 SDOH — ECONOMIC STABILITY: TRANSPORTATION INSECURITY
IN THE PAST 12 MONTHS, HAS LACK OF TRANSPORTATION KEPT YOU FROM MEETINGS, WORK, OR FROM GETTING THINGS NEEDED FOR DAILY LIVING?: NO

## 2023-11-27 SDOH — SOCIAL STABILITY: SOCIAL INSECURITY: WITHIN THE LAST YEAR, HAVE YOU BEEN HUMILIATED OR EMOTIONALLY ABUSED IN OTHER WAYS BY YOUR PARTNER OR EX-PARTNER?: NO

## 2023-11-27 SDOH — HEALTH STABILITY: MENTAL HEALTH: HOW OFTEN DO YOU HAVE 6 OR MORE DRINKS ON ONE OCCASION?: NEVER

## 2023-11-27 SDOH — SOCIAL STABILITY: SOCIAL INSECURITY
WITHIN THE LAST YEAR, HAVE YOU BEEN KICKED, HIT, SLAPPED, OR OTHERWISE PHYSICALLY HURT BY YOUR PARTNER OR EX-PARTNER?: NO

## 2023-11-27 SDOH — HEALTH STABILITY: MENTAL HEALTH
STRESS IS WHEN SOMEONE FEELS TENSE, NERVOUS, ANXIOUS, OR CAN'T SLEEP AT NIGHT BECAUSE THEIR MIND IS TROUBLED. HOW STRESSED ARE YOU?: NOT AT ALL

## 2023-11-27 SDOH — ECONOMIC STABILITY: FOOD INSECURITY: WITHIN THE PAST 12 MONTHS, YOU WORRIED THAT YOUR FOOD WOULD RUN OUT BEFORE YOU GOT MONEY TO BUY MORE.: NEVER TRUE

## 2023-11-27 SDOH — HEALTH STABILITY: MENTAL HEALTH: HOW MANY STANDARD DRINKS CONTAINING ALCOHOL DO YOU HAVE ON A TYPICAL DAY?: 1 OR 2

## 2023-11-27 SDOH — SOCIAL STABILITY: SOCIAL INSECURITY: ABUSE: ADULT

## 2023-11-27 SDOH — SOCIAL STABILITY: SOCIAL NETWORK: ARE YOU MARRIED, WIDOWED, DIVORCED, SEPARATED, NEVER MARRIED, OR LIVING WITH A PARTNER?: NEVER MARRIED

## 2023-11-27 SDOH — SOCIAL STABILITY: SOCIAL INSECURITY: WERE YOU ABLE TO COMPLETE ALL THE BEHAVIORAL HEALTH SCREENINGS?: YES

## 2023-11-27 SDOH — ECONOMIC STABILITY: TRANSPORTATION INSECURITY
IN THE PAST 12 MONTHS, HAS THE LACK OF TRANSPORTATION KEPT YOU FROM MEDICAL APPOINTMENTS OR FROM GETTING MEDICATIONS?: NO

## 2023-11-27 SDOH — SOCIAL STABILITY: SOCIAL INSECURITY: DO YOU FEEL UNSAFE GOING BACK TO THE PLACE WHERE YOU ARE LIVING?: NO

## 2023-11-27 SDOH — SOCIAL STABILITY: SOCIAL INSECURITY: WITHIN THE LAST YEAR, HAVE YOU BEEN AFRAID OF YOUR PARTNER OR EX-PARTNER?: NO

## 2023-11-27 SDOH — SOCIAL STABILITY: SOCIAL INSECURITY: DOES ANYONE TRY TO KEEP YOU FROM HAVING/CONTACTING OTHER FRIENDS OR DOING THINGS OUTSIDE YOUR HOME?: NO

## 2023-11-27 SDOH — SOCIAL STABILITY: SOCIAL NETWORK: HOW OFTEN DO YOU ATTENT MEETINGS OF THE CLUB OR ORGANIZATION YOU BELONG TO?: NEVER

## 2023-11-27 SDOH — SOCIAL STABILITY: SOCIAL INSECURITY: ARE YOU OR HAVE YOU BEEN THREATENED OR ABUSED PHYSICALLY, EMOTIONALLY, OR SEXUALLY BY ANYONE?: NO

## 2023-11-27 SDOH — HEALTH STABILITY: PHYSICAL HEALTH: ON AVERAGE, HOW MANY MINUTES DO YOU ENGAGE IN EXERCISE AT THIS LEVEL?: 0 MIN

## 2023-11-27 SDOH — ECONOMIC STABILITY: INCOME INSECURITY: IN THE PAST 12 MONTHS, HAS THE ELECTRIC, GAS, OIL, OR WATER COMPANY THREATENED TO SHUT OFF SERVICE IN YOUR HOME?: NO

## 2023-11-27 SDOH — SOCIAL STABILITY: SOCIAL INSECURITY: DO YOU FEEL ANYONE HAS EXPLOITED OR TAKEN ADVANTAGE OF YOU FINANCIALLY OR OF YOUR PERSONAL PROPERTY?: NO

## 2023-11-27 SDOH — ECONOMIC STABILITY: INCOME INSECURITY: IN THE LAST 12 MONTHS, WAS THERE A TIME WHEN YOU WERE NOT ABLE TO PAY THE MORTGAGE OR RENT ON TIME?: NO

## 2023-11-27 SDOH — SOCIAL STABILITY: SOCIAL NETWORK: HOW OFTEN DO YOU ATTEND CHURCH OR RELIGIOUS SERVICES?: NEVER

## 2023-11-27 SDOH — ECONOMIC STABILITY: INCOME INSECURITY: HOW HARD IS IT FOR YOU TO PAY FOR THE VERY BASICS LIKE FOOD, HOUSING, MEDICAL CARE, AND HEATING?: NOT HARD AT ALL

## 2023-11-27 SDOH — SOCIAL STABILITY: SOCIAL NETWORK
IN A TYPICAL WEEK, HOW MANY TIMES DO YOU TALK ON THE PHONE WITH FAMILY, FRIENDS, OR NEIGHBORS?: MORE THAN THREE TIMES A WEEK

## 2023-11-27 SDOH — SOCIAL STABILITY: SOCIAL INSECURITY: HAVE YOU HAD THOUGHTS OF HARMING ANYONE ELSE?: NO

## 2023-11-27 SDOH — SOCIAL STABILITY: SOCIAL INSECURITY: ARE THERE ANY APPARENT SIGNS OF INJURIES/BEHAVIORS THAT COULD BE RELATED TO ABUSE/NEGLECT?: NO

## 2023-11-27 ASSESSMENT — COGNITIVE AND FUNCTIONAL STATUS - GENERAL
PATIENT BASELINE BEDBOUND: NO
MOBILITY SCORE: 24
DAILY ACTIVITIY SCORE: 24
PATIENT BASELINE BEDBOUND: NO

## 2023-11-27 ASSESSMENT — PAIN SCALES - GENERAL
PAINLEVEL_OUTOF10: 0 - NO PAIN

## 2023-11-27 ASSESSMENT — ACTIVITIES OF DAILY LIVING (ADL)
HEARING - RIGHT EAR: FUNCTIONAL
HEARING - LEFT EAR: FUNCTIONAL
LACK_OF_TRANSPORTATION: NO
JUDGMENT_ADEQUATE_SAFELY_COMPLETE_DAILY_ACTIVITIES: YES
GROOMING: INDEPENDENT
PATIENT'S MEMORY ADEQUATE TO SAFELY COMPLETE DAILY ACTIVITIES?: YES
ADEQUATE_TO_COMPLETE_ADL: YES
BATHING: INDEPENDENT
DRESSING YOURSELF: INDEPENDENT
FEEDING YOURSELF: INDEPENDENT
LACK_OF_TRANSPORTATION: NO
LACK_OF_TRANSPORTATION: NO
WALKS IN HOME: INDEPENDENT
TOILETING: INDEPENDENT

## 2023-11-27 ASSESSMENT — PAIN - FUNCTIONAL ASSESSMENT
PAIN_FUNCTIONAL_ASSESSMENT: 0-10

## 2023-11-27 ASSESSMENT — LIFESTYLE VARIABLES
AUDIT-C TOTAL SCORE: 0
SKIP TO QUESTIONS 9-10: 1
AUDIT-C TOTAL SCORE: 0
SKIP TO QUESTIONS 9-10: 1
PRESCIPTION_ABUSE_PAST_12_MONTHS: NO
HOW OFTEN DO YOU HAVE 6 OR MORE DRINKS ON ONE OCCASION: NEVER
HOW MANY STANDARD DRINKS CONTAINING ALCOHOL DO YOU HAVE ON A TYPICAL DAY: PATIENT DOES NOT DRINK
HOW OFTEN DO YOU HAVE A DRINK CONTAINING ALCOHOL: NEVER
SUBSTANCE_ABUSE_PAST_12_MONTHS: NO
AUDIT-C TOTAL SCORE: 1

## 2023-11-27 ASSESSMENT — COLUMBIA-SUICIDE SEVERITY RATING SCALE - C-SSRS
2. HAVE YOU ACTUALLY HAD ANY THOUGHTS OF KILLING YOURSELF?: NO
1. IN THE PAST MONTH, HAVE YOU WISHED YOU WERE DEAD OR WISHED YOU COULD GO TO SLEEP AND NOT WAKE UP?: NO
6. HAVE YOU EVER DONE ANYTHING, STARTED TO DO ANYTHING, OR PREPARED TO DO ANYTHING TO END YOUR LIFE?: NO

## 2023-11-27 ASSESSMENT — PATIENT HEALTH QUESTIONNAIRE - PHQ9
SUM OF ALL RESPONSES TO PHQ9 QUESTIONS 1 & 2: 0
2. FEELING DOWN, DEPRESSED OR HOPELESS: NOT AT ALL
1. LITTLE INTEREST OR PLEASURE IN DOING THINGS: NOT AT ALL

## 2023-11-27 NOTE — NURSING NOTE
Admission vital signs obtained.  Pt. Oriented to the room.  Call light reach.  Bed alarm on.  Tele monitor on.

## 2023-11-27 NOTE — ED PROVIDER NOTES
HPI   Chief Complaint   Patient presents with    Weakness, Gen     Patient reports she self caths herself at home and had a recent UTI.  It was left untreated but has been taking Cipro since Friday evening and symptoms have subsided.  Reports she had been having clots and feels like her blood count may be low and she may need a transfusion.  Also c/o palpitations.       HPI  58-year-old female comes emergency room chief complaint of passing clots again.  She has a history of recurrent UTIs she does have a stent left ureter.  She denies fever chills or night sweats; no nausea vomiting or diarrhea or constipation  Started taking Cipro last Friday for presumed UTI culture was not good; has a history of Pseudomonas however which is sensitive to Levaquin; also has a history of E. Coli  Patient complains of severe fatigue some palpitation                  Samra Coma Scale Score: 15                  Patient History   Past Medical History:   Diagnosis Date    Abdominal pain, LLQ (left lower quadrant) 07/18/2023    Acute UTI 07/18/2023    Cellulitis 07/18/2023    Constipation 07/18/2023    Encounter for other preprocedural examination     Preop testing    Left wrist pain 07/18/2023    Right shoulder pain 07/18/2023    Skin irritation 07/18/2023    Stiffness of right shoulder joint 07/18/2023     Past Surgical History:   Procedure Laterality Date    OTHER SURGICAL HISTORY  11/01/2017    Nephrectomy Right    US GUIDED BIOPSY LYMPH NODE SUPERFICIAL  12/3/2018    US GUIDED BIOPSY LYMPH NODE SUPERFICIAL 12/3/2018 [facility]    VAGINAL MASS EXCISION  03/09/2015    Excision Of Vaginal Cyst Or Tumor     Family History   Problem Relation Name Age of Onset    Endometrial cancer Mother      Hypertension Mother      Obesity Mother      Lung cancer Father      Leukemia Brother      Other (Endometrial carcinoma) Mother       Social History     Tobacco Use    Smoking status: Never    Smokeless tobacco: Never   Substance Use Topics     Alcohol use: Never    Drug use: Never       Physical Exam   ED Triage Vitals   Temp Heart Rate Resp BP   -- 11/27/23 1208 11/27/23 1032 11/27/23 1032    (!) 116 16 (!) 126/98      SpO2 Temp src Heart Rate Source Patient Position   11/27/23 1208 -- -- --   100 %         BP Location FiO2 (%)     -- --             Physical Exam  Patient is alert cooperative very pale  Lungs are clear to auscultation and percussion  Cardiac is regular rate and rhythm tachycardic of 110 blood pressure is stable  Abdomen is soft nontender positive bowel sounds there is no hepatosplenomegaly or CVA tenderness appreciated  Extremities without cyanosis clubbing erythema or edema  Patient has left chronic lymphedema which is not new  ED Course & MDM   ED Course as of 12/05/23 1040   Mon Nov 27, 2023   1136 CBC and Auto Differential [AC]   1212 CBC and Auto Differential [AC]   1213 CBC and Auto Differential [AC]   1216 CBC and Auto Differential [AC]   1234 CBC and Auto Differential [AC]      ED Course User Index  [AC] Marcela Muhammad MD         Diagnoses as of 12/05/23 1040   Severe anemia   De Quervain's tenosynovitis   Iron deficiency anemia due to chronic blood loss   Called from lab with hemoglobin of 4.1; will admit to medicine service for transfuse up to at least 7; urine is pending; will consult urology    Medical Decision Making  Admit medicine service    Procedure  Procedures     Marcela Muhammad MD  11/27/23 1242       Marcela Muhammad MD  12/05/23 1040

## 2023-11-27 NOTE — NURSING NOTE
Assumed care of pt from ED, RN. Pt assisted into bed and oriented to environment at this time. Pt c/o palpations r/t anemia. MD Muhammad aware. 2+pulses and cap refill brisk in lower extremities. Pt has left lower edema due to history of lymphedema , per pt this is her baseline. Telemetry applied. Bed alarm activated. Denies any pain.

## 2023-11-27 NOTE — PROGRESS NOTES
"Laurence Campbell is a 58 y.o. female on day 0 of admission presenting with Severe anemia.    Subjective   Patient states that she has been passing clots from her bladder intermittently over the last 3 days; this is not a new problem for her but it accelerated recently she was seen by Dr. Carbajal approximately 1 week ago she started Cipro on Friday for presumed urinary tract infection; she stated the clotting has decreased but she feels that she needs a blood transfusion which she has had in the past; she denies any chest pain chest pressure or shortness of breath       Objective     Physical Exam  Patient is alert pale cooperative  Vital signs are stable  Lungs are clear to auscultation and percussion  Cardiac is regular rate and rhythm rate of 102 at exam normal S1-S2 without murmur gallop rub click S3 or S4  Abdomen is soft nontender positive bowel sounds there is no hepatosplenomegaly or CVA tenderness appreciated  Patient has chronic left lower lymphedema-no change  Right leg without edema  Last Recorded Vitals  Blood pressure 108/53, pulse (!) 114, temperature 37 °C (98.6 °F), temperature source Temporal, resp. rate 16, height 1.702 m (5' 7.01\"), weight 75.3 kg (166 lb), SpO2 100 %.  Intake/Output last 3 Shifts:  No intake/output data recorded.    Relevant Results              Current Facility-Administered Medications:     acetaminophen (Tylenol) tablet 650 mg, 650 mg, oral, q4h PRN **OR** acetaminophen (Tylenol) oral liquid 650 mg, 650 mg, oral, q4h PRN **OR** acetaminophen (Tylenol) suppository 650 mg, 650 mg, rectal, q4h PRN, Yu Landis PA-C    [START ON 11/28/2023] ferrous sulfate (325 mg ferrous sulfate) tablet 0.5 tablet, 32.5 mg of iron, oral, Daily with breakfast, Yu Landis PA-C    lactated Ringer's infusion, 125 mL/hr, intravenous, Continuous, Marcela Muhammad MD, Last Rate: 125 mL/hr at 11/27/23 1707, 125 mL/hr at 11/27/23 1707    levoFLOXacin (Levaquin)  mg, 500 mg, intravenous, " q24h, Yu Landis PA-C, Stopped at 11/27/23 1708    melatonin tablet 3 mg, 3 mg, oral, Daily PRN, Yu Landis PA-C    ondansetron ODT (Zofran-ODT) disintegrating tablet 4 mg, 4 mg, oral, q8h PRN **OR** ondansetron (Zofran) injection 4 mg, 4 mg, intravenous, q8h PRN, Yu Landis PA-C    polyethylene glycol (Glycolax, Miralax) packet 17 g, 17 g, oral, Daily, Yu Landis PA-C, 17 g at 11/27/23 1608     Results for orders placed or performed during the hospital encounter of 11/27/23 (from the past 96 hour(s))   Comprehensive metabolic panel   Result Value Ref Range    Glucose 146 (H) 74 - 99 mg/dL    Sodium 137 136 - 145 mmol/L    Potassium 3.5 3.5 - 5.3 mmol/L    Chloride 106 98 - 107 mmol/L    Bicarbonate 19 (L) 21 - 32 mmol/L    Anion Gap 16 10 - 20 mmol/L    Urea Nitrogen 13 6 - 23 mg/dL    Creatinine 1.02 0.50 - 1.05 mg/dL    eGFR 64 >60 mL/min/1.73m*2    Calcium 9.2 8.6 - 10.3 mg/dL    Albumin 3.4 3.4 - 5.0 g/dL    Alkaline Phosphatase 37 33 - 110 U/L    Total Protein 6.0 (L) 6.4 - 8.2 g/dL    AST 10 9 - 39 U/L    Bilirubin, Total 0.4 0.0 - 1.2 mg/dL    ALT 6 (L) 7 - 45 U/L   Lactate   Result Value Ref Range    Lactate 3.0 (H) 0.4 - 2.0 mmol/L   Light Blue Top   Result Value Ref Range    Extra Tube Hold for add-ons.    Blood Bank Hold Tube   Result Value Ref Range    Extra Tube Hold for add-ons.    Type and screen   Result Value Ref Range    ABO TYPE O     Rh TYPE POS     ANTIBODY SCREEN NEG    CBC and Auto Differential   Result Value Ref Range    WBC 7.6 4.4 - 11.3 x10*3/uL    nRBC      RBC 1.60 (L) 4.00 - 5.20 x10*6/uL    Hemoglobin 4.1 (LL) 12.0 - 16.0 g/dL    Hematocrit 14.2 (L) 36.0 - 46.0 %    MCV 89 80 - 100 fL    MCH 25.6 (L) 26.0 - 34.0 pg    MCHC 28.9 (L) 32.0 - 36.0 g/dL    RDW 23.7 (H) 11.5 - 14.5 %    Platelets 358 150 - 450 x10*3/uL    Neutrophils % 72.3 40.0 - 80.0 %    Immature Granulocytes %, Automated 0.8 0.0 - 0.9 %    Lymphocytes % 16.5 13.0 - 44.0 %    Monocytes % 10.0 2.0  - 10.0 %    Eosinophils % 0.3 0.0 - 6.0 %    Basophils % 0.1 0.0 - 2.0 %    Neutrophils Absolute 5.51 1.20 - 7.70 x10*3/uL    Immature Granulocytes Absolute, Automated 0.06 0.00 - 0.70 x10*3/uL    Lymphocytes Absolute 1.26 1.20 - 4.80 x10*3/uL    Monocytes Absolute 0.76 0.10 - 1.00 x10*3/uL    Eosinophils Absolute 0.02 0.00 - 0.70 x10*3/uL    Basophils Absolute 0.01 0.00 - 0.10 x10*3/uL   Urinalysis with Reflex Microscopic and Culture   Result Value Ref Range    Color, Urine Red (N) Straw, Yellow    Appearance, Urine Cloudy (N) Clear    Specific Gravity, Urine 1.020 1.005 - 1.035    pH, Urine 7.5 5.0, 5.5, 6.0, 6.5, 7.0, 7.5, 8.0    Protein, Urine >=300 (3+) (A) NEGATIVE, TRACE mg/dL    Glucose, Urine 100 (1+) (A) NEGATIVE mg/dL    Blood, Urine LARGE (3+) (A) NEGATIVE    Ketones, Urine 15 (1+) (A) NEGATIVE mg/dL    Bilirubin, Urine NEGATIVE NEGATIVE    Urobilinogen, Urine 2.0 (N) 0.2, 1.0 mg/dL    Nitrite, Urine POSITIVE (A) NEGATIVE    Leukocyte Esterase, Urine MODERATE (2+) (A) NEGATIVE   Extra Urine Gray Tube   Result Value Ref Range    Extra Tube Hold for add-ons.    Microscopic Only, Urine   Result Value Ref Range    WBC, Urine 11-20 (A) 1-5, NONE /HPF    RBC, Urine >20 (A) NONE, 1-2, 3-5 /HPF    Squamous Epithelial Cells, Urine 1-9 (SPARSE) Reference range not established. /HPF    Bacteria, Urine 2+ (A) NONE SEEN /HPF   Lactate   Result Value Ref Range    Lactate 3.5 (H) 0.4 - 2.0 mmol/L   Blood Bank Hold Tube   Result Value Ref Range    Extra Tube Hold for add-ons.    VERIFY ABO/Rh Group Test   Result Value Ref Range    ABO TYPE O     Rh TYPE POS    Prepare RBC: 2 Units   Result Value Ref Range    PRODUCT CODE V2831X64     Unit Number [id number]     Unit ABO O     Unit RH NEG     XM INTEP COMP     Dispense Status XM     Blood Expiration Date December 19, 2023 23:59 EST     PRODUCT BLOOD TYPE 9500     UNIT VOLUME 350     PRODUCT CODE G8989N91     Unit Number [id number]     Unit ABO O     Unit RH NEG      XM INTEP COMP     Dispense Status XM     Blood Expiration Date December 19, 2023 23:59 EST     PRODUCT BLOOD TYPE 9500     UNIT VOLUME 350    Hemoglobin and hematocrit, blood   Result Value Ref Range    Hemoglobin 3.8 (LL) 12.0 - 16.0 g/dL    Hematocrit 13.4 (L) 36.0 - 46.0 %   Prepare RBC: 2 Units, Irradiated   Result Value Ref Range    PRODUCT CODE U6348Y30     Unit Number [id number]     Unit ABO O     Unit RH NEG     XM INTEP COMP     Dispense Status XM     Blood Expiration Date December 20, 2023 23:59 EST     PRODUCT BLOOD TYPE 9500     UNIT VOLUME 350                      Assessment/Plan   Principal Problem:    Severe anemia  Patient is hemodynamically stable despite the low hemoglobin; patient does not want unmatched blood; blood has been ordered for transfusion-will transfuse 2 units ASAP and 2 additional units have been ordered  Patient has passed no clots since she has been here  Urinary tract infection  Urine that was sent in the beginning of the week was tossed because it was too old  Reviewed patient's previous UTIs she had Pseudomonas which was sensitive to Levaquin  Have started IV Levaquin  Consult urology  DVT prophylaxis  SCD  Ambulate post transfusion  Will not use pharmacologic anticoagulation secondary to current bleeding status         I spent 45 minutes in the professional and overall care of this patient.      Marcela Muhammad MD

## 2023-11-27 NOTE — NURSING NOTE
Spoke to lab at this time. Hemoglobin is 3.8.  in route with blood products. MD Muhammad notified.

## 2023-11-27 NOTE — CARE PLAN
RN TCC met with patient at the bedside to complete assessment. (SEE EPIC)  58 yr old female admitted for Severe Anemia. On admission H&H was 4.1/14.2. Treatment includes blood transfusions.   Pt has a history of self cath d/t neurogenic bladder. Was being treated for a UTI prior to admission.   TCC anticipates home with no skilled needs, and outpatient follow up care.  PCP follow up scheduled on 12/18/23 at 3:00 pm.

## 2023-11-27 NOTE — CARE PLAN
The patient's goals for the shift include  pain free     The clinical goals for the shift include Pain free

## 2023-11-28 ENCOUNTER — APPOINTMENT (OUTPATIENT)
Dept: CARDIOLOGY | Facility: HOSPITAL | Age: 58
End: 2023-11-28
Payer: MEDICARE

## 2023-11-28 LAB
ANION GAP SERPL CALC-SCNC: 12 MMOL/L (ref 10–20)
ATRIAL RATE: 124 BPM
BACTERIA UR CULT: NORMAL
BASOPHILS # BLD AUTO: 0.01 X10*3/UL (ref 0–0.1)
BASOPHILS # BLD AUTO: 0.02 X10*3/UL (ref 0–0.1)
BASOPHILS NFR BLD AUTO: 0.1 %
BASOPHILS NFR BLD AUTO: 0.3 %
BLOOD EXPIRATION DATE: NORMAL
BUN SERPL-MCNC: 11 MG/DL (ref 6–23)
CALCIUM SERPL-MCNC: 8.4 MG/DL (ref 8.6–10.3)
CHLORIDE SERPL-SCNC: 110 MMOL/L (ref 98–107)
CO2 SERPL-SCNC: 19 MMOL/L (ref 21–32)
CREAT SERPL-MCNC: 1 MG/DL (ref 0.5–1.05)
DISPENSE STATUS: NORMAL
EOSINOPHIL # BLD AUTO: 0.09 X10*3/UL (ref 0–0.7)
EOSINOPHIL # BLD AUTO: 0.29 X10*3/UL (ref 0–0.7)
EOSINOPHIL NFR BLD AUTO: 1.2 %
EOSINOPHIL NFR BLD AUTO: 3.2 %
ERYTHROCYTE [DISTWIDTH] IN BLOOD BY AUTOMATED COUNT: 16.7 % (ref 11.5–14.5)
ERYTHROCYTE [DISTWIDTH] IN BLOOD BY AUTOMATED COUNT: 17.8 % (ref 11.5–14.5)
GFR SERPL CREATININE-BSD FRML MDRD: 66 ML/MIN/1.73M*2
GLUCOSE SERPL-MCNC: 104 MG/DL (ref 74–99)
HCT VFR BLD AUTO: 20.2 % (ref 36–46)
HCT VFR BLD AUTO: 25.6 % (ref 36–46)
HCT VFR BLD AUTO: 27.7 % (ref 36–46)
HGB BLD-MCNC: 6.6 G/DL (ref 12–16)
HGB BLD-MCNC: 8.2 G/DL (ref 12–16)
HGB BLD-MCNC: 9.1 G/DL (ref 12–16)
IMM GRANULOCYTES # BLD AUTO: 0.04 X10*3/UL (ref 0–0.7)
IMM GRANULOCYTES # BLD AUTO: 0.04 X10*3/UL (ref 0–0.7)
IMM GRANULOCYTES NFR BLD AUTO: 0.4 % (ref 0–0.9)
IMM GRANULOCYTES NFR BLD AUTO: 0.5 % (ref 0–0.9)
LACTATE SERPL-SCNC: 0.9 MMOL/L (ref 0.4–2)
LYMPHOCYTES # BLD AUTO: 1.81 X10*3/UL (ref 1.2–4.8)
LYMPHOCYTES # BLD AUTO: 1.82 X10*3/UL (ref 1.2–4.8)
LYMPHOCYTES NFR BLD AUTO: 19.8 %
LYMPHOCYTES NFR BLD AUTO: 23.5 %
MCH RBC QN AUTO: 27.9 PG (ref 26–34)
MCH RBC QN AUTO: 28.6 PG (ref 26–34)
MCHC RBC AUTO-ENTMCNC: 32.7 G/DL (ref 32–36)
MCHC RBC AUTO-ENTMCNC: 32.9 G/DL (ref 32–36)
MCV RBC AUTO: 85 FL (ref 80–100)
MCV RBC AUTO: 87 FL (ref 80–100)
MONOCYTES # BLD AUTO: 0.78 X10*3/UL (ref 0.1–1)
MONOCYTES # BLD AUTO: 0.79 X10*3/UL (ref 0.1–1)
MONOCYTES NFR BLD AUTO: 10.2 %
MONOCYTES NFR BLD AUTO: 8.5 %
NEUTROPHILS # BLD AUTO: 4.98 X10*3/UL (ref 1.2–7.7)
NEUTROPHILS # BLD AUTO: 6.23 X10*3/UL (ref 1.2–7.7)
NEUTROPHILS NFR BLD AUTO: 64.3 %
NEUTROPHILS NFR BLD AUTO: 68 %
NRBC BLD-RTO: ABNORMAL /100{WBCS}
P AXIS: 64 DEGREES
P OFFSET: 197 MS
P ONSET: 147 MS
PLATELET # BLD AUTO: 330 X10*3/UL (ref 150–450)
PLATELET # BLD AUTO: 337 X10*3/UL (ref 150–450)
PMV BLD AUTO: 9.4 FL (ref 7.5–11.5)
POTASSIUM SERPL-SCNC: 3.6 MMOL/L (ref 3.5–5.3)
PR INTERVAL: 154 MS
PRODUCT BLOOD TYPE: 9500
PRODUCT CODE: NORMAL
Q ONSET: 224 MS
QRS COUNT: 21 BEATS
QRS DURATION: 70 MS
QT INTERVAL: 302 MS
QTC CALCULATION(BAZETT): 433 MS
QTC FREDERICIA: 384 MS
R AXIS: 56 DEGREES
RBC # BLD AUTO: 2.31 X10*6/UL (ref 4–5.2)
RBC # BLD AUTO: 3.26 X10*6/UL (ref 4–5.2)
SODIUM SERPL-SCNC: 137 MMOL/L (ref 136–145)
T AXIS: 62 DEGREES
T OFFSET: 375 MS
UNIT ABO: NORMAL
UNIT NUMBER: NORMAL
UNIT RH: NORMAL
UNIT VOLUME: 350
VENTRICULAR RATE: 124 BPM
WBC # BLD AUTO: 7.7 X10*3/UL (ref 4.4–11.3)
WBC # BLD AUTO: 9.2 X10*3/UL (ref 4.4–11.3)
XM INTEP: NORMAL

## 2023-11-28 PROCEDURE — 36415 COLL VENOUS BLD VENIPUNCTURE: CPT | Performed by: PHYSICIAN ASSISTANT

## 2023-11-28 PROCEDURE — 2500000004 HC RX 250 GENERAL PHARMACY W/ HCPCS (ALT 636 FOR OP/ED): Performed by: PHYSICIAN ASSISTANT

## 2023-11-28 PROCEDURE — 2500000001 HC RX 250 WO HCPCS SELF ADMINISTERED DRUGS (ALT 637 FOR MEDICARE OP): Performed by: PHYSICIAN ASSISTANT

## 2023-11-28 PROCEDURE — 83605 ASSAY OF LACTIC ACID: CPT | Performed by: EMERGENCY MEDICINE

## 2023-11-28 PROCEDURE — 80048 BASIC METABOLIC PNL TOTAL CA: CPT | Performed by: PHYSICIAN ASSISTANT

## 2023-11-28 PROCEDURE — 1200000002 HC GENERAL ROOM WITH TELEMETRY DAILY

## 2023-11-28 PROCEDURE — P9040 RBC LEUKOREDUCED IRRADIATED: HCPCS

## 2023-11-28 PROCEDURE — 36430 TRANSFUSION BLD/BLD COMPNT: CPT

## 2023-11-28 PROCEDURE — 93005 ELECTROCARDIOGRAM TRACING: CPT

## 2023-11-28 PROCEDURE — 85014 HEMATOCRIT: CPT | Performed by: PHYSICIAN ASSISTANT

## 2023-11-28 PROCEDURE — 85025 COMPLETE CBC W/AUTO DIFF WBC: CPT | Performed by: PHYSICIAN ASSISTANT

## 2023-11-28 RX ADMIN — LEVOFLOXACIN 500 MG: 500 INJECTION, SOLUTION INTRAVENOUS at 16:42

## 2023-11-28 RX ADMIN — FERROUS SULFATE TAB 325 MG (65 MG ELEMENTAL FE) 0.5 TABLET: 325 (65 FE) TAB at 08:35

## 2023-11-28 ASSESSMENT — COGNITIVE AND FUNCTIONAL STATUS - GENERAL
DAILY ACTIVITIY SCORE: 24
MOBILITY SCORE: 20
MOVING TO AND FROM BED TO CHAIR: A LITTLE
CLIMB 3 TO 5 STEPS WITH RAILING: A LITTLE
WALKING IN HOSPITAL ROOM: A LITTLE
STANDING UP FROM CHAIR USING ARMS: A LITTLE
MOBILITY SCORE: 24
DAILY ACTIVITIY SCORE: 24

## 2023-11-28 ASSESSMENT — PAIN - FUNCTIONAL ASSESSMENT
PAIN_FUNCTIONAL_ASSESSMENT: 0-10
PAIN_FUNCTIONAL_ASSESSMENT: 0-10

## 2023-11-28 ASSESSMENT — PAIN SCALES - GENERAL
PAINLEVEL_OUTOF10: 0 - NO PAIN

## 2023-11-28 NOTE — PROGRESS NOTES
"  Progress Note:    Laurence Campbell is a 58 y.o. female on day 1 of admission presenting with Severe anemia.    Subjective   Patient is feeling much better. Still has hematuria. Hemoglobin trend has improved; 4.1, 3.8, 6.6, and now 8.2.  Fourth unit blood is starting to infuse. Dr. Carbajal was consulted and we were instructed to start three-way Chowdhury irrigation.  Urine culture results are still pending.       Physical Exam  General: No acute distress, alert & oriented    Cardiac: RRR, NL S1 and S2, no murmurs, rubs or gallops    Pulmonary: Lungs clear to auscultation bilaterally, no wheezes, rhales or rhonchi    Abdomen: Soft, non-tender, non-distended    Extremities: No clubbing , cyanosis or edema.     Last Recorded Vitals  Blood pressure 138/74, pulse 80, temperature 36.7 °C (98.1 °F), temperature source Oral, resp. rate 16, height 1.702 m (5' 7.01\"), weight 75.3 kg (166 lb), SpO2 100 %.    Scheduled medications   Medication Dose Route Frequency    ferrous sulfate (325 mg ferrous sulfate)  32.5 mg of iron oral Daily with breakfast    levoFLOXacin  500 mg intravenous q24h    polyethylene glycol  17 g oral Daily     Relevant Results  Results from last 7 days   Lab Units 11/28/23  0950 11/28/23  0332 11/27/23  1737 11/27/23  1155   WBC AUTO x10*3/uL  --  7.7  --  7.6   HEMOGLOBIN g/dL 8.2* 6.6* 3.8* 4.1*   HEMATOCRIT % 25.6* 20.2* 13.4* 14.2*   PLATELETS AUTO x10*3/uL  --  330  --  358      Results from last 7 days   Lab Units 11/28/23  0332 11/27/23  1110   SODIUM mmol/L 137 137   POTASSIUM mmol/L 3.6 3.5   CHLORIDE mmol/L 110* 106   CO2 mmol/L 19* 19*   BUN mg/dL 11 13   CREATININE mg/dL 1.00 1.02   GLUCOSE mg/dL 104* 146*   CALCIUM mg/dL 8.4* 9.2         Assessment/Plan   1) anemia   Patient receiving 4th unit of blood.  Hemoglobin has improved to 8.2 but still has active hematuria w/ clots.  Vital signs are stable.    2) hematuria/UTI   Likely due to radiation cystitis and/or hemorrhagic cystitis from urinary " tract infection   Continue Levaquin until urine cultures are resulted.   H/o Pseudomonas aeruginosa on 9/21/23, Klebsiella oxytoca with E. coli on 8/22/2023   Dr. Carbajal has instructed three-way bladder irrigation  Morning lactate is normal.  White blood cells normal. Afebrile.  3) DVT prophylaxis   SCDs, ambulation       I spent 35 minutes in the professional and overall care of this patient.      Yu Landis PA-C

## 2023-11-28 NOTE — NURSING NOTE
3-way 24fr catheter meza placed at this time, per MD Muhammad orders. 30cc balloon inflated. Stat lock in place. Pt tolerated well. Dark red output drainage in bag. Continuous irrigation running at slow rate. Denies any increased pain.

## 2023-11-28 NOTE — NURSING NOTE
[facility] Pt resting in bed with call light in reach. Lung sounds clear bilaterally. Blood infusing per orders. Bed alarm activated. Denies any needs.

## 2023-11-28 NOTE — CARE PLAN
Problem: Fall/Injury  Goal: Not fall by end of shift  11/28/2023 0411 by Taylor Velazco RN  Outcome: Progressing  11/28/2023 0411 by Taylor Velazco RN  Outcome: Progressing  Goal: Be free from injury by end of the shift  11/28/2023 0411 by Taylor Velazco RN  Outcome: Progressing  11/28/2023 0411 by Taylor Velazco RN  Outcome: Progressing  Goal: Verbalize understanding of personal risk factors for fall in the hospital  11/28/2023 0411 by Taylor Velazco RN  Outcome: Progressing  11/28/2023 0411 by Taylor Velazco RN  Outcome: Progressing  Goal: Verbalize understanding of risk factor reduction measures to prevent injury from fall in the home  11/28/2023 0411 by Taylor Velazco RN  Outcome: Progressing  11/28/2023 0411 by Taylor Velazco RN  Outcome: Progressing  Goal: Use assistive devices by end of the shift  11/28/2023 0411 by Taylor Velazco RN  Outcome: Progressing  11/28/2023 0411 by Taylor Velazco RN  Outcome: Progressing  Goal: Pace activities to prevent fatigue by end of the shift  11/28/2023 0411 by Taylor Velazco RN  Outcome: Progressing  11/28/2023 0411 by Taylor Velazco RN  Outcome: Progressing   The patient's goals for the shift include      The clinical goals for the shift include Pain free

## 2023-11-28 NOTE — CARE PLAN
The patient's goals for the shift include  pain control     The clinical goals for the shift include Pain free

## 2023-11-28 NOTE — NURSING NOTE
Pt. Vital signs obtained.  Pt. Encouraged. To order breakfast.   Call light within reach.  Bed alarm on.

## 2023-11-28 NOTE — CARE PLAN
RN TCC met with patient during MDR to discuss care/discharge plan.   Pt in with severe anemia. H&H improved 8.2/25.6.  Urology consulted d/t hematuria and clotting. Plan is to insert 3 way meza with continuous irrigation. IV antibiotics continuing for UTI.  Patient will remain in house until medically cleared for discharge.  TCC anticipates home with no skilled needs. Will need to follow up with PCP and Urology.

## 2023-11-28 NOTE — PROGRESS NOTES
Patient seen, chart reviewed.  First unit of blood is transfusing with no adverse reactions noted.  Patient denies any pain, shortness of breath, pruritus, chest heaviness, or lightheadedness.  Heart rate is improving.  Repeat labs will be drawn after second unit is transfused.  Discussed case with nursing.      Zahida Pruett MD

## 2023-11-29 ENCOUNTER — APPOINTMENT (OUTPATIENT)
Dept: CARDIOLOGY | Facility: HOSPITAL | Age: 58
End: 2023-11-29
Payer: MEDICARE

## 2023-11-29 ENCOUNTER — APPOINTMENT (OUTPATIENT)
Dept: HEMATOLOGY/ONCOLOGY | Facility: HOSPITAL | Age: 58
End: 2023-11-29
Payer: MEDICARE

## 2023-11-29 LAB
ANION GAP SERPL CALC-SCNC: 9 MMOL/L (ref 10–20)
ATRIAL RATE: 76 BPM
BASOPHILS # BLD AUTO: 0.02 X10*3/UL (ref 0–0.1)
BASOPHILS NFR BLD AUTO: 0.3 %
BUN SERPL-MCNC: 10 MG/DL (ref 6–23)
CALCIUM SERPL-MCNC: 8.8 MG/DL (ref 8.6–10.3)
CHLORIDE SERPL-SCNC: 112 MMOL/L (ref 98–107)
CO2 SERPL-SCNC: 22 MMOL/L (ref 21–32)
CREAT SERPL-MCNC: 0.95 MG/DL (ref 0.5–1.05)
EOSINOPHIL # BLD AUTO: 0.44 X10*3/UL (ref 0–0.7)
EOSINOPHIL NFR BLD AUTO: 6.4 %
ERYTHROCYTE [DISTWIDTH] IN BLOOD BY AUTOMATED COUNT: 17.2 % (ref 11.5–14.5)
GFR SERPL CREATININE-BSD FRML MDRD: 70 ML/MIN/1.73M*2
GLUCOSE SERPL-MCNC: 94 MG/DL (ref 74–99)
HCT VFR BLD AUTO: 27.1 % (ref 36–46)
HCT VFR BLD AUTO: 27.4 % (ref 36–46)
HGB BLD-MCNC: 8.6 G/DL (ref 12–16)
HGB BLD-MCNC: 9 G/DL (ref 12–16)
IMM GRANULOCYTES # BLD AUTO: 0.03 X10*3/UL (ref 0–0.7)
IMM GRANULOCYTES NFR BLD AUTO: 0.4 % (ref 0–0.9)
LYMPHOCYTES # BLD AUTO: 1.65 X10*3/UL (ref 1.2–4.8)
LYMPHOCYTES NFR BLD AUTO: 24 %
MCH RBC QN AUTO: 27.9 PG (ref 26–34)
MCHC RBC AUTO-ENTMCNC: 32.8 G/DL (ref 32–36)
MCV RBC AUTO: 85 FL (ref 80–100)
MONOCYTES # BLD AUTO: 0.61 X10*3/UL (ref 0.1–1)
MONOCYTES NFR BLD AUTO: 8.9 %
NEUTROPHILS # BLD AUTO: 4.12 X10*3/UL (ref 1.2–7.7)
NEUTROPHILS NFR BLD AUTO: 60 %
NRBC BLD-RTO: ABNORMAL /100{WBCS}
P AXIS: 66 DEGREES
P OFFSET: 193 MS
P ONSET: 149 MS
PLATELET # BLD AUTO: 321 X10*3/UL (ref 150–450)
POTASSIUM SERPL-SCNC: 4.1 MMOL/L (ref 3.5–5.3)
PR INTERVAL: 142 MS
Q ONSET: 220 MS
QRS COUNT: 12 BEATS
QRS DURATION: 78 MS
QT INTERVAL: 392 MS
QTC CALCULATION(BAZETT): 441 MS
QTC FREDERICIA: 424 MS
R AXIS: 62 DEGREES
RBC # BLD AUTO: 3.23 X10*6/UL (ref 4–5.2)
SODIUM SERPL-SCNC: 139 MMOL/L (ref 136–145)
T AXIS: 55 DEGREES
T OFFSET: 416 MS
VENTRICULAR RATE: 76 BPM
WBC # BLD AUTO: 6.9 X10*3/UL (ref 4.4–11.3)

## 2023-11-29 PROCEDURE — 2500000001 HC RX 250 WO HCPCS SELF ADMINISTERED DRUGS (ALT 637 FOR MEDICARE OP): Performed by: PHYSICIAN ASSISTANT

## 2023-11-29 PROCEDURE — 36415 COLL VENOUS BLD VENIPUNCTURE: CPT | Performed by: PHYSICIAN ASSISTANT

## 2023-11-29 PROCEDURE — 1200000002 HC GENERAL ROOM WITH TELEMETRY DAILY

## 2023-11-29 PROCEDURE — 80048 BASIC METABOLIC PNL TOTAL CA: CPT | Performed by: PHYSICIAN ASSISTANT

## 2023-11-29 PROCEDURE — 85025 COMPLETE CBC W/AUTO DIFF WBC: CPT | Performed by: EMERGENCY MEDICINE

## 2023-11-29 PROCEDURE — 36415 COLL VENOUS BLD VENIPUNCTURE: CPT | Performed by: EMERGENCY MEDICINE

## 2023-11-29 PROCEDURE — 93005 ELECTROCARDIOGRAM TRACING: CPT

## 2023-11-29 PROCEDURE — 85018 HEMOGLOBIN: CPT | Performed by: EMERGENCY MEDICINE

## 2023-11-29 RX ORDER — ACYCLOVIR 200 MG/1
400 CAPSULE ORAL 2 TIMES DAILY
Status: DISCONTINUED | OUTPATIENT
Start: 2023-11-29 | End: 2023-12-05 | Stop reason: HOSPADM

## 2023-11-29 RX ORDER — LEVOFLOXACIN 500 MG/1
500 TABLET, FILM COATED ORAL
Status: COMPLETED | OUTPATIENT
Start: 2023-11-29 | End: 2023-12-05

## 2023-11-29 RX ADMIN — FERROUS SULFATE TAB 325 MG (65 MG ELEMENTAL FE) 0.5 TABLET: 325 (65 FE) TAB at 07:44

## 2023-11-29 RX ADMIN — ACYCLOVIR 400 MG: 200 CAPSULE ORAL at 09:27

## 2023-11-29 RX ADMIN — LEVOFLOXACIN 500 MG: 500 TABLET, FILM COATED ORAL at 16:56

## 2023-11-29 RX ADMIN — ACYCLOVIR 400 MG: 200 CAPSULE ORAL at 21:11

## 2023-11-29 ASSESSMENT — PAIN SCALES - GENERAL
PAINLEVEL_OUTOF10: 0 - NO PAIN

## 2023-11-29 ASSESSMENT — PAIN - FUNCTIONAL ASSESSMENT
PAIN_FUNCTIONAL_ASSESSMENT: 0-10

## 2023-11-29 NOTE — PROGRESS NOTES
"  Progress Note:    Laurence Campbell is a 58 y.o. female on day 2 of admission presenting with Severe anemia.    Subjective   Upon rounds this morning patient states she is doing well.  Urine is still bloody.  Dr. Pennington rounded on patient and would like for her to continue CBI until tomorrow and reevaluate.  Patient's hemoglobin is stabilized at 9.0.      Physical Exam  General: No acute distress, alert & oriented    Cardiac: RRR, NL S1 and S2, no murmurs, rubs or gallops    Pulmonary: Lungs clear to auscultation bilaterally, no wheezes, rhales or rhonchi    : Chowdhury draining, medium red, moderate irrigation rate    Extremities: No clubbing , cyanosis or edema.     Last Recorded Vitals  Blood pressure 128/73, pulse 74, temperature 36.5 °C (97.7 °F), temperature source Temporal, resp. rate 18, height 1.702 m (5' 7.01\"), weight 75.3 kg (166 lb), SpO2 100 %.  Scheduled medications   Medication Dose Route Frequency    acyclovir  400 mg oral BID    ferrous sulfate (325 mg ferrous sulfate)  32.5 mg of iron oral Daily with breakfast    levoFLOXacin  500 mg intravenous q24h    polyethylene glycol  17 g oral Daily     Relevant Results  Results from last 7 days   Lab Units 11/29/23  0534 11/28/23  2004 11/28/23  0950 11/28/23  0332   WBC AUTO x10*3/uL 6.9 9.2  --  7.7   HEMOGLOBIN g/dL 9.0* 9.1* 8.2* 6.6*   HEMATOCRIT % 27.4* 27.7* 25.6* 20.2*   PLATELETS AUTO x10*3/uL 321 337  --  330      Results from last 7 days   Lab Units 11/29/23  0533 11/28/23  0332 11/27/23  1110   SODIUM mmol/L 139 137 137   POTASSIUM mmol/L 4.1 3.6 3.5   CHLORIDE mmol/L 112* 110* 106   CO2 mmol/L 22 19* 19*   BUN mg/dL 10 11 13   CREATININE mg/dL 0.95 1.00 1.02   GLUCOSE mg/dL 94 104* 146*   CALCIUM mg/dL 8.8 8.4* 9.2         Assessment/Plan   1) anemia  Yesterday, she received a total of 4 units of blood. Patient hemoglobin is stabilized at 9.0.  Vital signs have remained stable, she is no longer tachycardic.  2) hematuria/UTI              " Likely due to radiation cystitis and/or hemorrhagic cystitis from urinary tract infection             Urine culture is negative, however she was on cipro prior to Ucx. Starting today, I will change IV Levaquin to po x 7 days.              Dr. Carbajal has instructed three-way bladder irrigation to continue till tomorrow.  Then reevaluate.  White blood cells remain normal. Afebrile.  3) DVT prophylaxis              SCDs, ambulation       I spent 35 minutes in the professional and overall care of this patient.      Yu Landis PA-C

## 2023-11-29 NOTE — NURSING NOTE
Pt. Vital signs obtained.  Pt. Encouraged to order breakfast.  Call light within reach.  Bed alarm on.

## 2023-11-29 NOTE — NURSING NOTE
Assumed care of pt from nightshift, RN. Pt resting in bed with call light in reach. Denies any pain or needs. Chowdhury draining without kinks. Output light red. Continuous irrigation dripping at appropriate rate. Call light within reach.

## 2023-11-29 NOTE — CARE PLAN
Problem: Fall/Injury  Goal: Not fall by end of shift  Outcome: Progressing  Goal: Be free from injury by end of the shift  Outcome: Progressing  Goal: Verbalize understanding of personal risk factors for fall in the hospital  Outcome: Progressing  Goal: Verbalize understanding of risk factor reduction measures to prevent injury from fall in the home  Outcome: Progressing  Goal: Use assistive devices by end of the shift  Outcome: Progressing  Goal: Pace activities to prevent fatigue by end of the shift  Outcome: Progressing   The patient's goals for the shift include      The clinical goals for the shift include Pain control

## 2023-11-29 NOTE — PROGRESS NOTES
Patient seen, chart reviewed.  She is watching TV, sitting up in bed with no acute complaints.  Vital signs are stable.  Hemoglobin stable at 9.1 after 4 units packed red blood cell transfusion today.  Chowdhury catheter in place with CBI running, urine is red and clear.      Zahida Pruett MD

## 2023-11-29 NOTE — CARE PLAN
58 yr old female admitted for Severe Anemia. H&H 9.0/27.4  Continuous bladder irrigation still in place.   IV levaquin switched to oral.   Discharge within 1-2 days with no needs when medically ready.

## 2023-11-29 NOTE — CARE PLAN
The patient's goals for the shift include  decreased bleeding     The clinical goals for the shift include decreased bleeding

## 2023-11-29 NOTE — NURSING NOTE
During assessment pt output in meza was dark red. Pt c/o abdominal fullness. Pt was manually irrigated again with 120cc and same amount removed. Moderate amount of clots removed and urine still dark red. CBI rate increased to moderate rate. MD Muhammad notified at this time regarding darkening output color and increased clots. Orders given to redraw Hgb and Hematocrit at 8pm tonight. No further orders.

## 2023-11-29 NOTE — NURSING NOTE
Pt on telemetry and monitor reading ST Multi ii avf. Assessed pt at this time and denies any symptoms. Pt does not appear in distress. 12 lead EKG completed at this time and showed NSR. PAC Yu notified and given EKG. EKG now in patients chart. No abnormal findings found. Telemetry leads changed. Will continue to monitor.

## 2023-11-29 NOTE — NURSING NOTE
Pt c/o bladder fullness at this time. On assessment noticed catheter not draining. Pt was manually irrigated with 120cc and same amount removed. During irrigation patient expelled several small clots.  Pt tolerated well and stated pressure relieved. Chowdhury draining properly without kinks.

## 2023-11-29 NOTE — CONSULTS
"Reason For Consult  hematuria    History Of Present Illness  Laurence Campbell is a 58 y.o. female presenting with gross hematuria.  She has anemia and received a transfusion.  She has a history of hematuria     Past Medical History  She has a past medical history of Abdominal pain, LLQ (left lower quadrant) (07/18/2023), Acute UTI (07/18/2023), Cellulitis (07/18/2023), Constipation (07/18/2023), Encounter for other preprocedural examination, Left wrist pain (07/18/2023), Right shoulder pain (07/18/2023), Skin irritation (07/18/2023), and Stiffness of right shoulder joint (07/18/2023).    Surgical History  She has a past surgical history that includes Vaginal mass excision (03/09/2015); Other surgical history (11/01/2017); and US guided biopsy lymph node superficial (12/3/2018).     Social History  She reports that she has never smoked. She has never used smokeless tobacco. She reports that she does not drink alcohol and does not use drugs.    Family History  Family History   Problem Relation Name Age of Onset    Endometrial cancer Mother      Hypertension Mother      Obesity Mother      Lung cancer Father      Leukemia Brother      Other (Endometrial carcinoma) Mother          Allergies  Amoxicillin, Alendronate sodium, Alendronic acid, Amoxicillin, Clindamycin, Penicillin, Piperacillin-tazobactam, Sulfa (sulfonamide antibiotics), Sulfa (sulfonamide antibiotics), and Clindamycin    Review of Systems  Hematuria     Physical Exam  Alert, oriented  Normal resp effort  RRR  Abd soft, NT, ND  Chowdhury draining medium red urine with irrigation running     Last Recorded Vitals  Blood pressure 124/70, pulse 98, temperature 37.1 °C (98.8 °F), temperature source Oral, resp. rate 18, height 1.702 m (5' 7.01\"), weight 75.3 kg (166 lb), SpO2 100 %.    Relevant Results  Labs reviewed     Assessment/Plan     58 year old has gross hematuria and a history of radiation cystitis.  She has a left ureteral stent and a history of a right " nephrectomy.   She has anemia and received a transfusion.  Follow H/H.  Continue irrgation, wean off drip.  We discussed hyperbaric oxygen after discharge.        Michael Pennington MD

## 2023-11-29 NOTE — PROGRESS NOTES
"Laurence Campbell is a 58 y.o. female on day 2 of admission presenting with Severe anemia.    Subjective   Patient feels well, no complaints.  Hemoglobin stable.       Objective     Physical Exam  Alert, oriented  Normal respiratory effort  Abdomen soft nontender nondistended  Chowdhury draining, medium red, moderate irrigation rate    Last Recorded Vitals  Blood pressure 128/73, pulse 74, temperature 36.5 °C (97.7 °F), temperature source Temporal, resp. rate 18, height 1.702 m (5' 7.01\"), weight 75.3 kg (166 lb), SpO2 100 %.  Intake/Output last 3 Shifts:  I/O last 3 completed shifts:  In: 3005 (39.9 mL/kg) [P.O.:1180; Blood:1825]  Out: 1250 (16.6 mL/kg) [Urine:1250 (0.5 mL/kg/hr)]  Weight: 75.3 kg     Relevant Results  Scheduled medications  acyclovir, 400 mg, oral, BID  ferrous sulfate (325 mg ferrous sulfate), 32.5 mg of iron, oral, Daily with breakfast  levoFLOXacin, 500 mg, intravenous, q24h  polyethylene glycol, 17 g, oral, Daily      Continuous medications     PRN medications  PRN medications: acetaminophen **OR** acetaminophen **OR** acetaminophen, alum-mag hydroxide-simeth, benzocaine-menthol, benzonatate, melatonin, ondansetron ODT **OR** ondansetron, polyvinyl alcohol-povidone, sodium chloride    Results for orders placed or performed during the hospital encounter of 11/27/23 (from the past 24 hour(s))   Hemoglobin and hematocrit, blood   Result Value Ref Range    Hemoglobin 8.2 (L) 12.0 - 16.0 g/dL    Hematocrit 25.6 (L) 36.0 - 46.0 %   ECG 12 lead   Result Value Ref Range    Ventricular Rate 124 BPM    Atrial Rate 124 BPM    LA Interval 154 ms    QRS Duration 70 ms    QT Interval 302 ms    QTC Calculation(Bazett) 433 ms    P Axis 64 degrees    R Axis 56 degrees    T Axis 62 degrees    QRS Count 21 beats    Q Onset 224 ms    P Onset 147 ms    P Offset 197 ms    T Offset 375 ms    QTC Fredericia 384 ms   CBC and Auto Differential   Result Value Ref Range    WBC 9.2 4.4 - 11.3 x10*3/uL    nRBC      RBC 3.26 " (L) 4.00 - 5.20 x10*6/uL    Hemoglobin 9.1 (L) 12.0 - 16.0 g/dL    Hematocrit 27.7 (L) 36.0 - 46.0 %    MCV 85 80 - 100 fL    MCH 27.9 26.0 - 34.0 pg    MCHC 32.9 32.0 - 36.0 g/dL    RDW 16.7 (H) 11.5 - 14.5 %    Platelets 337 150 - 450 x10*3/uL    Neutrophils % 68.0 40.0 - 80.0 %    Immature Granulocytes %, Automated 0.4 0.0 - 0.9 %    Lymphocytes % 19.8 13.0 - 44.0 %    Monocytes % 8.5 2.0 - 10.0 %    Eosinophils % 3.2 0.0 - 6.0 %    Basophils % 0.1 0.0 - 2.0 %    Neutrophils Absolute 6.23 1.20 - 7.70 x10*3/uL    Immature Granulocytes Absolute, Automated 0.04 0.00 - 0.70 x10*3/uL    Lymphocytes Absolute 1.81 1.20 - 4.80 x10*3/uL    Monocytes Absolute 0.78 0.10 - 1.00 x10*3/uL    Eosinophils Absolute 0.29 0.00 - 0.70 x10*3/uL    Basophils Absolute 0.01 0.00 - 0.10 x10*3/uL   Basic metabolic panel   Result Value Ref Range    Glucose 94 74 - 99 mg/dL    Sodium 139 136 - 145 mmol/L    Potassium 4.1 3.5 - 5.3 mmol/L    Chloride 112 (H) 98 - 107 mmol/L    Bicarbonate 22 21 - 32 mmol/L    Anion Gap 9 (L) 10 - 20 mmol/L    Urea Nitrogen 10 6 - 23 mg/dL    Creatinine 0.95 0.50 - 1.05 mg/dL    eGFR 70 >60 mL/min/1.73m*2    Calcium 8.8 8.6 - 10.3 mg/dL   CBC and Auto Differential   Result Value Ref Range    WBC 6.9 4.4 - 11.3 x10*3/uL    nRBC      RBC 3.23 (L) 4.00 - 5.20 x10*6/uL    Hemoglobin 9.0 (L) 12.0 - 16.0 g/dL    Hematocrit 27.4 (L) 36.0 - 46.0 %    MCV 85 80 - 100 fL    MCH 27.9 26.0 - 34.0 pg    MCHC 32.8 32.0 - 36.0 g/dL    RDW 17.2 (H) 11.5 - 14.5 %    Platelets 321 150 - 450 x10*3/uL    Neutrophils % 60.0 40.0 - 80.0 %    Immature Granulocytes %, Automated 0.4 0.0 - 0.9 %    Lymphocytes % 24.0 13.0 - 44.0 %    Monocytes % 8.9 2.0 - 10.0 %    Eosinophils % 6.4 0.0 - 6.0 %    Basophils % 0.3 0.0 - 2.0 %    Neutrophils Absolute 4.12 1.20 - 7.70 x10*3/uL    Immature Granulocytes Absolute, Automated 0.03 0.00 - 0.70 x10*3/uL    Lymphocytes Absolute 1.65 1.20 - 4.80 x10*3/uL    Monocytes Absolute 0.61 0.10 - 1.00  x10*3/uL    Eosinophils Absolute 0.44 0.00 - 0.70 x10*3/uL    Basophils Absolute 0.02 0.00 - 0.10 x10*3/uL       ECG 12 lead    Result Date: 11/28/2023  Sinus tachycardia Possible Left atrial enlargement Nonspecific ST and T wave abnormality Poor data quality Abnormal ECG Confirmed by Gen Chao ([id number]) on 11/28/2023 12:19:47 PM                This patient has a urinary catheter   Reason for the urinary catheter remaining today? urinary retention/bladder outlet obstruction, acute or chronic               Assessment/Plan   Principal Problem:    Severe anemia    Patient has persistent gross hematuria requiring bladder irrigation.  It has improved some since yesterday.  Her hemoglobin is stable.  Continue with irrigation at this point.  Hyperbaric oxygen plan is outpatient.           Michael Pennington MD

## 2023-11-30 LAB
ANION GAP SERPL CALC-SCNC: 10 MMOL/L (ref 10–20)
BASOPHILS # BLD AUTO: 0.01 X10*3/UL (ref 0–0.1)
BASOPHILS NFR BLD AUTO: 0.1 %
BUN SERPL-MCNC: 13 MG/DL (ref 6–23)
CALCIUM SERPL-MCNC: 8.7 MG/DL (ref 8.6–10.3)
CHLORIDE SERPL-SCNC: 109 MMOL/L (ref 98–107)
CO2 SERPL-SCNC: 23 MMOL/L (ref 21–32)
CREAT SERPL-MCNC: 1 MG/DL (ref 0.5–1.05)
EOSINOPHIL # BLD AUTO: 0.33 X10*3/UL (ref 0–0.7)
EOSINOPHIL NFR BLD AUTO: 4 %
ERYTHROCYTE [DISTWIDTH] IN BLOOD BY AUTOMATED COUNT: 17.5 % (ref 11.5–14.5)
ERYTHROCYTE [DISTWIDTH] IN BLOOD BY AUTOMATED COUNT: 17.8 % (ref 11.5–14.5)
GFR SERPL CREATININE-BSD FRML MDRD: 66 ML/MIN/1.73M*2
GLUCOSE SERPL-MCNC: 101 MG/DL (ref 74–99)
HCT VFR BLD AUTO: 25.3 % (ref 36–46)
HCT VFR BLD AUTO: 28 % (ref 36–46)
HCT VFR BLD AUTO: 28.8 % (ref 36–46)
HGB BLD-MCNC: 8.1 G/DL (ref 12–16)
HGB BLD-MCNC: 8.3 G/DL (ref 12–16)
HGB BLD-MCNC: 8.9 G/DL (ref 12–16)
IMM GRANULOCYTES # BLD AUTO: 0.05 X10*3/UL (ref 0–0.7)
IMM GRANULOCYTES NFR BLD AUTO: 0.6 % (ref 0–0.9)
LYMPHOCYTES # BLD AUTO: 1.33 X10*3/UL (ref 1.2–4.8)
LYMPHOCYTES NFR BLD AUTO: 16 %
MCH RBC QN AUTO: 27.3 PG (ref 26–34)
MCH RBC QN AUTO: 28 PG (ref 26–34)
MCHC RBC AUTO-ENTMCNC: 29.6 G/DL (ref 32–36)
MCHC RBC AUTO-ENTMCNC: 32 G/DL (ref 32–36)
MCV RBC AUTO: 88 FL (ref 80–100)
MCV RBC AUTO: 92 FL (ref 80–100)
MONOCYTES # BLD AUTO: 0.72 X10*3/UL (ref 0.1–1)
MONOCYTES NFR BLD AUTO: 8.7 %
NEUTROPHILS # BLD AUTO: 5.86 X10*3/UL (ref 1.2–7.7)
NEUTROPHILS NFR BLD AUTO: 70.6 %
NRBC BLD-RTO: ABNORMAL /100{WBCS}
NRBC BLD-RTO: ABNORMAL /100{WBCS}
PLATELET # BLD AUTO: 307 X10*3/UL (ref 150–450)
PLATELET # BLD AUTO: 353 X10*3/UL (ref 150–450)
POTASSIUM SERPL-SCNC: 4.3 MMOL/L (ref 3.5–5.3)
RBC # BLD AUTO: 2.89 X10*6/UL (ref 4–5.2)
RBC # BLD AUTO: 3.04 X10*6/UL (ref 4–5.2)
SODIUM SERPL-SCNC: 138 MMOL/L (ref 136–145)
WBC # BLD AUTO: 10.3 X10*3/UL (ref 4.4–11.3)
WBC # BLD AUTO: 8.3 X10*3/UL (ref 4.4–11.3)

## 2023-11-30 PROCEDURE — 2500000001 HC RX 250 WO HCPCS SELF ADMINISTERED DRUGS (ALT 637 FOR MEDICARE OP): Performed by: PHYSICIAN ASSISTANT

## 2023-11-30 PROCEDURE — 1200000002 HC GENERAL ROOM WITH TELEMETRY DAILY

## 2023-11-30 PROCEDURE — 85018 HEMOGLOBIN: CPT | Performed by: PHYSICIAN ASSISTANT

## 2023-11-30 PROCEDURE — 2500000004 HC RX 250 GENERAL PHARMACY W/ HCPCS (ALT 636 FOR OP/ED): Performed by: UROLOGY

## 2023-11-30 PROCEDURE — 36415 COLL VENOUS BLD VENIPUNCTURE: CPT | Performed by: STUDENT IN AN ORGANIZED HEALTH CARE EDUCATION/TRAINING PROGRAM

## 2023-11-30 PROCEDURE — 36415 COLL VENOUS BLD VENIPUNCTURE: CPT | Performed by: PHYSICIAN ASSISTANT

## 2023-11-30 PROCEDURE — 2500000004 HC RX 250 GENERAL PHARMACY W/ HCPCS (ALT 636 FOR OP/ED): Performed by: PHYSICIAN ASSISTANT

## 2023-11-30 PROCEDURE — 2500000005 HC RX 250 GENERAL PHARMACY W/O HCPCS: Performed by: UROLOGY

## 2023-11-30 PROCEDURE — 80048 BASIC METABOLIC PNL TOTAL CA: CPT | Performed by: PHYSICIAN ASSISTANT

## 2023-11-30 PROCEDURE — 85027 COMPLETE CBC AUTOMATED: CPT | Performed by: PHYSICIAN ASSISTANT

## 2023-11-30 PROCEDURE — 85025 COMPLETE CBC W/AUTO DIFF WBC: CPT | Performed by: STUDENT IN AN ORGANIZED HEALTH CARE EDUCATION/TRAINING PROGRAM

## 2023-11-30 RX ADMIN — ACYCLOVIR 400 MG: 200 CAPSULE ORAL at 20:27

## 2023-11-30 RX ADMIN — TRANEXAMIC ACID: 1 INJECTION, SOLUTION INTRAVENOUS at 15:04

## 2023-11-30 RX ADMIN — LEVOFLOXACIN 500 MG: 500 TABLET, FILM COATED ORAL at 15:04

## 2023-11-30 RX ADMIN — ACYCLOVIR 400 MG: 200 CAPSULE ORAL at 08:31

## 2023-11-30 RX ADMIN — FERROUS SULFATE TAB 325 MG (65 MG ELEMENTAL FE) 0.5 TABLET: 325 (65 FE) TAB at 08:31

## 2023-11-30 RX ADMIN — POLYETHYLENE GLYCOL 3350 17 G: 17 POWDER, FOR SOLUTION ORAL at 08:31

## 2023-11-30 ASSESSMENT — COGNITIVE AND FUNCTIONAL STATUS - GENERAL
DAILY ACTIVITIY SCORE: 24
DAILY ACTIVITIY SCORE: 24
MOBILITY SCORE: 24
MOBILITY SCORE: 24

## 2023-11-30 ASSESSMENT — PAIN SCALES - GENERAL
PAINLEVEL_OUTOF10: 0 - NO PAIN

## 2023-11-30 ASSESSMENT — PAIN - FUNCTIONAL ASSESSMENT
PAIN_FUNCTIONAL_ASSESSMENT: 0-10

## 2023-11-30 ASSESSMENT — PAIN SCALES - WONG BAKER: WONGBAKER_NUMERICALRESPONSE: NO HURT

## 2023-11-30 NOTE — NURSING NOTE
0658pm Pt continues to have bloody urine with CBI minimal clots noted, pt has call light in reach, hemoglobin level 8.9.

## 2023-11-30 NOTE — NURSING NOTE
1030am Pt c/o fullness to bladder manually irrigated with multiple dime size clots noted, CBI opened all the way up pt stated she can feel when there's clots will continue to monitor.

## 2023-11-30 NOTE — PROGRESS NOTES
Patient seen, chart reviewed.  Mildly tachycardic, but otherwise vital signs are stable.  Hemoglobin is stable.  Patient is asymptomatic.  Chowdhury continues to drain red, clear urine.      Zahida Pruett MD

## 2023-11-30 NOTE — PROGRESS NOTES
"Laurence Campbell is a 58 y.o. female on day 3 of admission presenting with Severe anemia.    Subjective   She says she is feeling better today.  She is not having much pain and is not lightheaded.  She continues on Levaquin to which her culture is sensitive.  It grew Pseudomonas.  She continues to bleed and her hematocrit is drifting down again.       Objective     Physical Exam  Awake, alert, oriented  Lungs: Normal respiratory pattern  : Chowdhury catheter in place.  Urine is bloody.    Last Recorded Vitals  Blood pressure 151/75, pulse 96, temperature 36.1 °C (97 °F), temperature source Temporal, resp. rate 16, height 1.702 m (5' 7.01\"), weight 75.3 kg (166 lb), SpO2 100 %.  Intake/Output last 3 Shifts:  I/O last 3 completed shifts:  In: 1930 (25.6 mL/kg) [P.O.:1930]  Out: - (0 mL/kg)   Weight: 75.3 kg     Relevant Results      Scheduled medications  acyclovir, 400 mg, oral, BID  ferrous sulfate (325 mg ferrous sulfate), 32.5 mg of iron, oral, Daily with breakfast  levoFLOXacin, 500 mg, oral, q24h ARIANNA  polyethylene glycol, 17 g, oral, Daily  tranexamic acid, 26.6 mg/kg, intravenous, Once      Continuous medications     PRN medications  PRN medications: acetaminophen **OR** acetaminophen **OR** acetaminophen, alum-mag hydroxide-simeth, benzocaine-menthol, benzonatate, melatonin, ondansetron ODT **OR** ondansetron, polyvinyl alcohol-povidone, sodium chloride  Results for orders placed or performed during the hospital encounter of 11/27/23 (from the past 24 hour(s))   Hemoglobin and hematocrit, blood   Result Value Ref Range    Hemoglobin 8.6 (L) 12.0 - 16.0 g/dL    Hematocrit 27.1 (L) 36.0 - 46.0 %   Electrocardiogram, 12-lead PRN ACS symptoms   Result Value Ref Range    Ventricular Rate 76 BPM    Atrial Rate 76 BPM    ND Interval 142 ms    QRS Duration 78 ms    QT Interval 392 ms    QTC Calculation(Bazett) 441 ms    P Axis 66 degrees    R Axis 62 degrees    T Axis 55 degrees    QRS Count 12 beats    Q Onset 220 " ms    P Onset 149 ms    P Offset 193 ms    T Offset 416 ms    QTC Fredericia 424 ms   Basic metabolic panel   Result Value Ref Range    Glucose 101 (H) 74 - 99 mg/dL    Sodium 138 136 - 145 mmol/L    Potassium 4.3 3.5 - 5.3 mmol/L    Chloride 109 (H) 98 - 107 mmol/L    Bicarbonate 23 21 - 32 mmol/L    Anion Gap 10 10 - 20 mmol/L    Urea Nitrogen 13 6 - 23 mg/dL    Creatinine 1.00 0.50 - 1.05 mg/dL    eGFR 66 >60 mL/min/1.73m*2    Calcium 8.7 8.6 - 10.3 mg/dL   CBC and Auto Differential   Result Value Ref Range    WBC 8.3 4.4 - 11.3 x10*3/uL    nRBC      RBC 2.89 (L) 4.00 - 5.20 x10*6/uL    Hemoglobin 8.1 (L) 12.0 - 16.0 g/dL    Hematocrit 25.3 (L) 36.0 - 46.0 %    MCV 88 80 - 100 fL    MCH 28.0 26.0 - 34.0 pg    MCHC 32.0 32.0 - 36.0 g/dL    RDW 17.5 (H) 11.5 - 14.5 %    Platelets 307 150 - 450 x10*3/uL    Neutrophils % 70.6 40.0 - 80.0 %    Immature Granulocytes %, Automated 0.6 0.0 - 0.9 %    Lymphocytes % 16.0 13.0 - 44.0 %    Monocytes % 8.7 2.0 - 10.0 %    Eosinophils % 4.0 0.0 - 6.0 %    Basophils % 0.1 0.0 - 2.0 %    Neutrophils Absolute 5.86 1.20 - 7.70 x10*3/uL    Immature Granulocytes Absolute, Automated 0.05 0.00 - 0.70 x10*3/uL    Lymphocytes Absolute 1.33 1.20 - 4.80 x10*3/uL    Monocytes Absolute 0.72 0.10 - 1.00 x10*3/uL    Eosinophils Absolute 0.33 0.00 - 0.70 x10*3/uL    Basophils Absolute 0.01 0.00 - 0.10 x10*3/uL                            Assessment/Plan   Principal Problem:    Severe anemia    Secondary to  bleeding from hemorrhagic cystitis and radiation cystitis.  She is requiring more transfusions and her hematocrit still is drifting down.    Will continue the bladder irrigation.  Have ordered TXA to see if this can decrease the bleeding.       I spent 15 minutes in the professional and overall care of this patient.      Akil Carbajal MD

## 2023-11-30 NOTE — NURSING NOTE
0740am Assumed care of pt, A/Ox 4 denies any c/o pains no distress noted. Pt has meza intact with CBI with bloody urine output noted. Pt in bed with call light in reach will continue to monitor.

## 2023-11-30 NOTE — CARE PLAN
Problem: Pain  Goal: Takes deep breaths with improved pain control throughout the shift  Outcome: Progressing  Goal: Turns in bed with improved pain control throughout the shift  Outcome: Progressing  Goal: Walks with improved pain control throughout the shift  Outcome: Progressing  Goal: Performs ADL's with improved pain control throughout shift  Outcome: Progressing  Goal: Participates in PT with improved pain control throughout the shift  Outcome: Progressing  Goal: Free from opioid side effects throughout the shift  Outcome: Progressing  Goal: Free from acute confusion related to pain meds throughout the shift  Outcome: Progressing     Problem: Fall/Injury  Goal: Not fall by end of shift  Outcome: Progressing  Goal: Be free from injury by end of the shift  Outcome: Progressing  Goal: Verbalize understanding of personal risk factors for fall in the hospital  Outcome: Progressing  Goal: Verbalize understanding of risk factor reduction measures to prevent injury from fall in the home  Outcome: Progressing  Goal: Use assistive devices by end of the shift  Outcome: Progressing  Goal: Pace activities to prevent fatigue by end of the shift  Outcome: Progressing     Problem: Pain - Adult  Goal: Verbalizes/displays adequate comfort level or baseline comfort level  Outcome: Progressing     Problem: Safety - Adult  Goal: Free from fall injury  Outcome: Progressing     Problem: Discharge Planning  Goal: Discharge to home or other facility with appropriate resources  Outcome: Progressing     Problem: Chronic Conditions and Co-morbidities  Goal: Patient's chronic conditions and co-morbidity symptoms are monitored and maintained or improved  Outcome: Progressing     Problem: Indwelling Catheter Maintenance  Goal: I will have no complications from indwelling catheter  Outcome: Progressing

## 2023-11-30 NOTE — PROGRESS NOTES
"  Progress Note:    Laurence Campbell is a 58 y.o. female on day 3 of admission presenting with Severe anemia.    Subjective   Pt very uncomfortable due to the formation of urinary clots requiring manual irrigation. She can feel the clots moving in the bladder. Urine is still very red and numerous clot development with multiple manual irrigations. CBI turned from moderate to high rate. Hemoglobin starting to decrease incrementally. Will order H/H for this evening.  has not rounded on patient yet this morning.        Physical Exam  General: In visible discomfort    Cardiac: RRR, NL S1 and S2, no murmurs, rubs or gallops    Pulmonary: Lungs clear to auscultation bilaterally, no wheezes, rhales or rhonchi    Abdomen: Soft, non-tender, non-distended    : Chowdhury draining, hematuria, high irrigation rate     Extremities: No clubbing , cyanosis or edema.     Last Recorded Vitals  Blood pressure 151/75, pulse 96, temperature 36.1 °C (97 °F), temperature source Temporal, resp. rate 16, height 1.702 m (5' 7.01\"), weight 75.3 kg (166 lb), SpO2 100 %.  Scheduled medications   Medication Dose Route Frequency    acyclovir  400 mg oral BID    ferrous sulfate (325 mg ferrous sulfate)  32.5 mg of iron oral Daily with breakfast    levoFLOXacin  500 mg oral q24h ARIANNA    polyethylene glycol  17 g oral Daily     Relevant Results  Results from last 7 days   Lab Units 11/30/23  0546 11/29/23 2033 11/29/23 0534 11/28/23 2004   WBC AUTO x10*3/uL 8.3  --  6.9 9.2   HEMOGLOBIN g/dL 8.1* 8.6* 9.0* 9.1*   HEMATOCRIT % 25.3* 27.1* 27.4* 27.7*   PLATELETS AUTO x10*3/uL 307  --  321 337      Results from last 7 days   Lab Units 11/30/23  0546 11/29/23  0533 11/28/23  0332   SODIUM mmol/L 138 139 137   POTASSIUM mmol/L 4.3 4.1 3.6   CHLORIDE mmol/L 109* 112* 110*   CO2 mmol/L 23 22 19*   BUN mg/dL 13 10 11   CREATININE mg/dL 1.00 0.95 1.00   GLUCOSE mg/dL 101* 94 104*   CALCIUM mg/dL 8.7 8.8 8.4*         Assessment/Plan   1) anemia   " Hemoglobin starting to decrease incrementally. Will order H/H for this evening.   2) hematuria/UTI              Likely due to radiation cystitis and/or hemorrhagic cystitis from urinary tract infection              Continue oral Levaquin; on day 2 of 7              CBI turned from moderate to high rate due to increasing clotting w/ meza obstruction  3) DVT prophylaxis              SCDs, ambulation       I spent 25 minutes in the professional and overall care of this patient.      Yu Landis PA-C

## 2023-11-30 NOTE — NURSING NOTE
1107am Pt noted on tele monitor with , upon assessing pt stated she was on the phone and had gotten upset, advised to relax HR returned to baseline 87 will continue to monitor.

## 2023-11-30 NOTE — NURSING NOTE
Assumed care of patient at 1900 and received report from day shift. Patient is lying in bed and in good spirits, watching television. She denies any pain at this time. Pca is in room at this time emptying meza bag and 203 small sized clots are noted. Patient's irrigation is running at a moderate speed. She has no other concerns at this time. Bloodwork ordered to draw at 2000. All safety measures in place, personal belongings within reach on bedside table, call light beside her in bed.

## 2023-11-30 NOTE — CARE PLAN
RN TCC met with patient at the bedside during MDR to complete assessment.  Pt in with severe anemia,  bleeding from hemorrhagic cystitis.   Still has continuous bladder irrigation in place. Hematuria present with clots.  Requiring more blood transfusions.  Went from (9.1/27.7) to now (8.1/25.3)  Urology following, plan to give TXA to decrease bleeding.  TCC to continue to follow to determine if patient will have any discharge needs.

## 2023-12-01 ENCOUNTER — APPOINTMENT (OUTPATIENT)
Dept: RADIOLOGY | Facility: HOSPITAL | Age: 58
DRG: 812 | End: 2023-12-01
Payer: MEDICARE

## 2023-12-01 LAB
ABO GROUP (TYPE) IN BLOOD: NORMAL
ANION GAP SERPL CALC-SCNC: 12 MMOL/L (ref 10–20)
ANTIBODY SCREEN: NORMAL
BLOOD EXPIRATION DATE: NORMAL
BUN SERPL-MCNC: 12 MG/DL (ref 6–23)
CALCIUM SERPL-MCNC: 8.7 MG/DL (ref 8.6–10.3)
CHLORIDE SERPL-SCNC: 107 MMOL/L (ref 98–107)
CO2 SERPL-SCNC: 22 MMOL/L (ref 21–32)
CREAT SERPL-MCNC: 0.9 MG/DL (ref 0.5–1.05)
DISPENSE STATUS: NORMAL
ERYTHROCYTE [DISTWIDTH] IN BLOOD BY AUTOMATED COUNT: 17.7 % (ref 11.5–14.5)
GFR SERPL CREATININE-BSD FRML MDRD: 75 ML/MIN/1.73M*2
GLUCOSE SERPL-MCNC: 98 MG/DL (ref 74–99)
HCT VFR BLD AUTO: 24.2 % (ref 36–46)
HGB BLD-MCNC: 7.5 G/DL (ref 12–16)
MCH RBC QN AUTO: 27.7 PG (ref 26–34)
MCHC RBC AUTO-ENTMCNC: 31 G/DL (ref 32–36)
MCV RBC AUTO: 89 FL (ref 80–100)
NRBC BLD-RTO: ABNORMAL /100{WBCS}
PLATELET # BLD AUTO: 340 X10*3/UL (ref 150–450)
POTASSIUM SERPL-SCNC: 4.3 MMOL/L (ref 3.5–5.3)
PRODUCT BLOOD TYPE: 5100
PRODUCT CODE: NORMAL
RBC # BLD AUTO: 2.71 X10*6/UL (ref 4–5.2)
RH FACTOR (ANTIGEN D): NORMAL
SODIUM SERPL-SCNC: 137 MMOL/L (ref 136–145)
UNIT ABO: NORMAL
UNIT NUMBER: NORMAL
UNIT RH: NORMAL
UNIT VOLUME: 350
WBC # BLD AUTO: 9.6 X10*3/UL (ref 4.4–11.3)
XM INTEP: NORMAL

## 2023-12-01 PROCEDURE — 86920 COMPATIBILITY TEST SPIN: CPT

## 2023-12-01 PROCEDURE — 80048 BASIC METABOLIC PNL TOTAL CA: CPT | Performed by: PHYSICIAN ASSISTANT

## 2023-12-01 PROCEDURE — 2500000001 HC RX 250 WO HCPCS SELF ADMINISTERED DRUGS (ALT 637 FOR MEDICARE OP): Performed by: PHYSICIAN ASSISTANT

## 2023-12-01 PROCEDURE — 85027 COMPLETE CBC AUTOMATED: CPT | Performed by: STUDENT IN AN ORGANIZED HEALTH CARE EDUCATION/TRAINING PROGRAM

## 2023-12-01 PROCEDURE — 2500000004 HC RX 250 GENERAL PHARMACY W/ HCPCS (ALT 636 FOR OP/ED): Performed by: PHYSICIAN ASSISTANT

## 2023-12-01 PROCEDURE — 36430 TRANSFUSION BLD/BLD COMPNT: CPT

## 2023-12-01 PROCEDURE — 86901 BLOOD TYPING SEROLOGIC RH(D): CPT | Performed by: PHYSICIAN ASSISTANT

## 2023-12-01 PROCEDURE — 36415 COLL VENOUS BLD VENIPUNCTURE: CPT | Performed by: PHYSICIAN ASSISTANT

## 2023-12-01 PROCEDURE — 76770 US EXAM ABDO BACK WALL COMP: CPT

## 2023-12-01 PROCEDURE — 1200000002 HC GENERAL ROOM WITH TELEMETRY DAILY

## 2023-12-01 PROCEDURE — P9040 RBC LEUKOREDUCED IRRADIATED: HCPCS

## 2023-12-01 PROCEDURE — 86850 RBC ANTIBODY SCREEN: CPT | Performed by: PHYSICIAN ASSISTANT

## 2023-12-01 RX ORDER — SODIUM CHLORIDE, SODIUM LACTATE, POTASSIUM CHLORIDE, CALCIUM CHLORIDE 600; 310; 30; 20 MG/100ML; MG/100ML; MG/100ML; MG/100ML
125 INJECTION, SOLUTION INTRAVENOUS CONTINUOUS
Status: ACTIVE | OUTPATIENT
Start: 2023-12-01 | End: 2023-12-02

## 2023-12-01 RX ADMIN — SODIUM CHLORIDE, SODIUM LACTATE, POTASSIUM CHLORIDE, AND CALCIUM CHLORIDE 125 ML/HR: 600; 310; 30; 20 INJECTION, SOLUTION INTRAVENOUS at 20:46

## 2023-12-01 RX ADMIN — SODIUM CHLORIDE, SODIUM LACTATE, POTASSIUM CHLORIDE, AND CALCIUM CHLORIDE 125 ML/HR: 600; 310; 30; 20 INJECTION, SOLUTION INTRAVENOUS at 12:24

## 2023-12-01 RX ADMIN — ACYCLOVIR 400 MG: 200 CAPSULE ORAL at 20:46

## 2023-12-01 RX ADMIN — POLYETHYLENE GLYCOL 3350 17 G: 17 POWDER, FOR SOLUTION ORAL at 08:16

## 2023-12-01 RX ADMIN — ACYCLOVIR 400 MG: 200 CAPSULE ORAL at 08:16

## 2023-12-01 RX ADMIN — FERROUS SULFATE TAB 325 MG (65 MG ELEMENTAL FE) 0.5 TABLET: 325 (65 FE) TAB at 08:16

## 2023-12-01 RX ADMIN — LEVOFLOXACIN 500 MG: 500 TABLET, FILM COATED ORAL at 16:44

## 2023-12-01 ASSESSMENT — COGNITIVE AND FUNCTIONAL STATUS - GENERAL
MOBILITY SCORE: 24
DAILY ACTIVITIY SCORE: 24
MOBILITY SCORE: 24
MOBILITY SCORE: 24

## 2023-12-01 ASSESSMENT — PAIN SCALES - GENERAL
PAINLEVEL_OUTOF10: 0 - NO PAIN

## 2023-12-01 ASSESSMENT — PAIN - FUNCTIONAL ASSESSMENT
PAIN_FUNCTIONAL_ASSESSMENT: 0-10
PAIN_FUNCTIONAL_ASSESSMENT: 0-10

## 2023-12-01 NOTE — NURSING NOTE
Assumed care of patient at 1900 and received report from day shift nurse. Patient is lying in bed and in good spirits, states her pain is still a 0 on scale of 0-10. She had to be manually irrigated several times today because of clots. She stated that not too long ago she was feeling some pressure in her bladder so she stretched the tubing part before the 3 way connection and a blood clot came flowing out afterwards and then she felt some relief. I told her to be careful doing this as she may dislodge it from her bladder. I am still noticing some clots in the irrigation bag when emptying it. She has no concerns at this time though, personal belongings within reach on bedside table, call light beside her in bed. Safety measures in place.

## 2023-12-01 NOTE — NURSING NOTE
0528pm Pt seen by  CBI slowed down no clots noted at this time urine noted light pink to red pt denies any c/o pains will continue to monitor.

## 2023-12-01 NOTE — PROGRESS NOTES
Progress Note:    Laurence Campbell is a 58 y.o. female on day 4 of admission presenting with Severe anemia due to hematuria.    Subjective   Overall patient is doing better than yesterday.  She continues to have cherry Colin-Aid urine draining in the bag with continuous bladder irrigation still running at moderate speed.  Per nursing, clots have lessened and patient is not uncomfortable from occluding clots like yesterday.  Tranexamic acid was given yesterday but she's still bleeding.  Now her hemoglobin his trending downward (now 7.5) and a new type and cross has been ordered.  Renal ultrasound does not identify any hematomas in the bladder.  And her heart rate is starting to trend upward.  This might be due to continuing blood loss.  We will continue to monitor.  Will likely need another transfusion.       Physical Exam  General: No acute distress, alert & oriented    Cardiac: Tachycardic, NL S1 and S2, no murmurs, rubs or gallops    Pulmonary: Lungs clear to auscultation bilaterally, no wheezes, rhales or rhonchi    Abdomen: Soft, non-tender, non-distended    : Cherry red Colin-Aid urine collecting in the Chowdhury bag with some streaking clots in the bag and tubing    Extremities: No clubbing , cyanosis or edema.     Last Recorded Vitals      11/30/2023     6:58 AM 11/30/2023    11:00 AM 11/30/2023     3:21 PM 11/30/2023     7:14 PM 11/30/2023    11:21 PM 12/1/2023     6:50 AM 12/1/2023    11:16 AM   Vitals   Systolic 124 151 109 132 119 109 120   Diastolic 77 75 69 61 59 57 55   Heart Rate 66 96 89 94 115 87 86   Temp 36.1 °C (97 °F) 36.1 °C (97 °F) 36.1 °C (97 °F) 36.2 °C (97.2 °F) 35.8 °C (96.4 °F) 36.2 °C (97.2 °F) 36.4 °C (97.5 °F)   Resp 16 16 16 16 14 16 16       Scheduled medications   Medication Dose Route Frequency    acyclovir  400 mg oral BID    ferrous sulfate (325 mg ferrous sulfate)  32.5 mg of iron oral Daily with breakfast    levoFLOXacin  500 mg oral q24h ARIANNA    polyethylene glycol  17 g oral  Daily     Relevant Results  Results from last 7 days   Lab Units 12/01/23  0609 11/30/23  2026 11/30/23  1648 11/30/23  0546   WBC AUTO x10*3/uL 9.6 10.3  --  8.3   HEMOGLOBIN g/dL 7.5* 8.3* 8.9* 8.1*   HEMATOCRIT % 24.2* 28.0* 28.8* 25.3*   PLATELETS AUTO x10*3/uL 340 353  --  307      Results from last 7 days   Lab Units 12/01/23  0609 11/30/23  0546 11/29/23  0533   SODIUM mmol/L 137 138 139   POTASSIUM mmol/L 4.3 4.3 4.1   CHLORIDE mmol/L 107 109* 112*   CO2 mmol/L 22 23 22   BUN mg/dL 12 13 10   CREATININE mg/dL 0.90 1.00 0.95   GLUCOSE mg/dL 98 101* 94   CALCIUM mg/dL 8.7 8.7 8.8      === 11/27/23 ===  US RENAL COMPLETE  - Impression -  1. Left-sided ureteral stent partially visualized.  2. Right kidney surgically absent.  3. Chowdhury catheter noted within a decompressed bladder. If there is further concern for bladder mass, consider CT urogram if patient's renal function may tolerate.    Assessment/Plan   1) anemia  Hemoglobin is trending down (7.5).    New type and cross ordered.   Expect more transfusions since still bleeding  2) hematuria/UTI              Likely due to radiation cystitis and/or hemorrhagic cystitis from urinary tract infection   Dr Carbajal said that her prior urine culture was positive for Pseudomonas.  She is on sensitive antibiotic.              Continue oral Levaquin; on day 3 of 7              CBI turned from high to moderate. Less clots today than yesterday. TXA not completely effective since still has hematuria.   3) Tachycardia   Episodes are becoming more frequent with minimal exertion.  Will restart IV fluids and continue to monitor hemoglobin/blood loss.  4) DVT prophylaxis              SCDs, ambulation      I spent 35 minutes in the professional and overall care of this patient.      Yu Landis PA-C

## 2023-12-01 NOTE — PROGRESS NOTES
Patient seen, chart reviewed.  Hemoglobin stable at 8.3.  Tachycardic, but otherwise vital signs are stable.  Patient denies any pain or distention at this time.  CBI is running, urine is draining clear and red.      Zahida Pruett MD

## 2023-12-01 NOTE — NURSING NOTE
0740am Assumed are of pt, A/Ox4 awake in bed denies any c/o pains no distress noted. Pt has CBI with meza draining bloody urine no clots observed at this time, call light remains in reach will continue to monitor.

## 2023-12-01 NOTE — PROGRESS NOTES
"Laurence Campbell is a 58 y.o. female on day 4 of admission presenting with Severe anemia.    Subjective   No complaints. Hematuria continues but improving.       Objective     Physical Exam  Alert, no distress  Abdomen soft, non distended  Chowdhury draining light pink with fast irrigation drip      Last Recorded Vitals  Blood pressure 120/53, pulse 101, temperature 36.4 °C (97.5 °F), temperature source Temporal, resp. rate 15, height 1.702 m (5' 7.01\"), weight 75.3 kg (166 lb), SpO2 100 %.  Intake/Output last 3 Shifts:  I/O last 3 completed shifts:  In: 480 (6.4 mL/kg) [P.O.:480]  Out: 2950 (39.2 mL/kg) [Urine:2950 (1.1 mL/kg/hr)]  Weight: 75.3 kg     Relevant Results      Scheduled medications  acyclovir, 400 mg, oral, BID  ferrous sulfate (325 mg ferrous sulfate), 32.5 mg of iron, oral, Daily with breakfast  levoFLOXacin, 500 mg, oral, q24h ARIANNA  polyethylene glycol, 17 g, oral, Daily      Continuous medications  lactated Ringer's, 125 mL/hr, Last Rate: 125 mL/hr (12/01/23 1224)      PRN medications  PRN medications: acetaminophen **OR** acetaminophen **OR** acetaminophen, alum-mag hydroxide-simeth, benzocaine-menthol, benzonatate, melatonin, ondansetron ODT **OR** ondansetron, polyvinyl alcohol-povidone, sodium chloride  Results for orders placed or performed during the hospital encounter of 11/27/23 (from the past 24 hour(s))   Hemoglobin and hematocrit, blood   Result Value Ref Range    Hemoglobin 8.9 (L) 12.0 - 16.0 g/dL    Hematocrit 28.8 (L) 36.0 - 46.0 %   CBC   Result Value Ref Range    WBC 10.3 4.4 - 11.3 x10*3/uL    nRBC      RBC 3.04 (L) 4.00 - 5.20 x10*6/uL    Hemoglobin 8.3 (L) 12.0 - 16.0 g/dL    Hematocrit 28.0 (L) 36.0 - 46.0 %    MCV 92 80 - 100 fL    MCH 27.3 26.0 - 34.0 pg    MCHC 29.6 (L) 32.0 - 36.0 g/dL    RDW 17.8 (H) 11.5 - 14.5 %    Platelets 353 150 - 450 x10*3/uL   Basic Metabolic Panel   Result Value Ref Range    Glucose 98 74 - 99 mg/dL    Sodium 137 136 - 145 mmol/L    Potassium 4.3 " 3.5 - 5.3 mmol/L    Chloride 107 98 - 107 mmol/L    Bicarbonate 22 21 - 32 mmol/L    Anion Gap 12 10 - 20 mmol/L    Urea Nitrogen 12 6 - 23 mg/dL    Creatinine 0.90 0.50 - 1.05 mg/dL    eGFR 75 >60 mL/min/1.73m*2    Calcium 8.7 8.6 - 10.3 mg/dL   CBC   Result Value Ref Range    WBC 9.6 4.4 - 11.3 x10*3/uL    nRBC      RBC 2.71 (L) 4.00 - 5.20 x10*6/uL    Hemoglobin 7.5 (L) 12.0 - 16.0 g/dL    Hematocrit 24.2 (L) 36.0 - 46.0 %    MCV 89 80 - 100 fL    MCH 27.7 26.0 - 34.0 pg    MCHC 31.0 (L) 32.0 - 36.0 g/dL    RDW 17.7 (H) 11.5 - 14.5 %    Platelets 340 150 - 450 x10*3/uL   Type and screen   Result Value Ref Range    ABO TYPE O     Rh TYPE POS     ANTIBODY SCREEN NEG    Prepare RBC: 1 Units, Irradiated   Result Value Ref Range    PRODUCT CODE E1072J69     Unit Number [id number]     Unit ABO O     Unit RH POS     XM INTEP COMP     Dispense Status XM     Blood Expiration Date December 25, 2023 23:59 EST     PRODUCT BLOOD TYPE 5100     UNIT VOLUME 350                This patient has a urinary catheter   Reason for the urinary catheter remaining today? urinary retention/bladder outlet obstruction, acute or chronic               Assessment/Plan   Principal Problem:    Severe anemia    Gross hematuria,  Irrigation slowed.  Continue to wean drip.  Monitor H/H.  Hyperbaric O2 as outpatient.         Michael Pennington MD

## 2023-12-02 LAB
ANION GAP SERPL CALC-SCNC: 10 MMOL/L (ref 10–20)
BUN SERPL-MCNC: 14 MG/DL (ref 6–23)
CALCIUM SERPL-MCNC: 8.4 MG/DL (ref 8.6–10.3)
CHLORIDE SERPL-SCNC: 108 MMOL/L (ref 98–107)
CO2 SERPL-SCNC: 23 MMOL/L (ref 21–32)
CREAT SERPL-MCNC: 0.93 MG/DL (ref 0.5–1.05)
ERYTHROCYTE [DISTWIDTH] IN BLOOD BY AUTOMATED COUNT: 18.2 % (ref 11.5–14.5)
GFR SERPL CREATININE-BSD FRML MDRD: 72 ML/MIN/1.73M*2
GLUCOSE SERPL-MCNC: 103 MG/DL (ref 74–99)
HCT VFR BLD AUTO: 24 % (ref 36–46)
HGB BLD-MCNC: 7.5 G/DL (ref 12–16)
MCH RBC QN AUTO: 27.1 PG (ref 26–34)
MCHC RBC AUTO-ENTMCNC: 31.3 G/DL (ref 32–36)
MCV RBC AUTO: 87 FL (ref 80–100)
NRBC BLD-RTO: ABNORMAL /100{WBCS}
PLATELET # BLD AUTO: 309 X10*3/UL (ref 150–450)
POTASSIUM SERPL-SCNC: 4.3 MMOL/L (ref 3.5–5.3)
RBC # BLD AUTO: 2.77 X10*6/UL (ref 4–5.2)
SODIUM SERPL-SCNC: 137 MMOL/L (ref 136–145)
WBC # BLD AUTO: 8 X10*3/UL (ref 4.4–11.3)

## 2023-12-02 PROCEDURE — 36430 TRANSFUSION BLD/BLD COMPNT: CPT

## 2023-12-02 PROCEDURE — 85027 COMPLETE CBC AUTOMATED: CPT | Performed by: PHYSICIAN ASSISTANT

## 2023-12-02 PROCEDURE — S0017 INJECTION, AMINOCAPROIC ACID: HCPCS | Performed by: EMERGENCY MEDICINE

## 2023-12-02 PROCEDURE — 36415 COLL VENOUS BLD VENIPUNCTURE: CPT | Performed by: PHYSICIAN ASSISTANT

## 2023-12-02 PROCEDURE — 1200000002 HC GENERAL ROOM WITH TELEMETRY DAILY

## 2023-12-02 PROCEDURE — 2500000004 HC RX 250 GENERAL PHARMACY W/ HCPCS (ALT 636 FOR OP/ED): Performed by: PHYSICIAN ASSISTANT

## 2023-12-02 PROCEDURE — 2500000001 HC RX 250 WO HCPCS SELF ADMINISTERED DRUGS (ALT 637 FOR MEDICARE OP): Performed by: PHYSICIAN ASSISTANT

## 2023-12-02 PROCEDURE — 80048 BASIC METABOLIC PNL TOTAL CA: CPT | Performed by: PHYSICIAN ASSISTANT

## 2023-12-02 PROCEDURE — 2500000004 HC RX 250 GENERAL PHARMACY W/ HCPCS (ALT 636 FOR OP/ED): Performed by: EMERGENCY MEDICINE

## 2023-12-02 RX ADMIN — FERROUS SULFATE TAB 325 MG (65 MG ELEMENTAL FE) 0.5 TABLET: 325 (65 FE) TAB at 08:24

## 2023-12-02 RX ADMIN — POLYETHYLENE GLYCOL 3350 17 G: 17 POWDER, FOR SOLUTION ORAL at 08:25

## 2023-12-02 RX ADMIN — ACYCLOVIR 400 MG: 200 CAPSULE ORAL at 08:25

## 2023-12-02 RX ADMIN — ACYCLOVIR 400 MG: 200 CAPSULE ORAL at 21:17

## 2023-12-02 RX ADMIN — SODIUM CHLORIDE, SODIUM LACTATE, POTASSIUM CHLORIDE, AND CALCIUM CHLORIDE 125 ML/HR: 600; 310; 30; 20 INJECTION, SOLUTION INTRAVENOUS at 07:46

## 2023-12-02 RX ADMIN — LEVOFLOXACIN 500 MG: 500 TABLET, FILM COATED ORAL at 17:49

## 2023-12-02 RX ADMIN — DEXTROSE MONOHYDRATE 1 G/HR: 5 INJECTION, SOLUTION INTRAVENOUS at 16:10

## 2023-12-02 ASSESSMENT — COGNITIVE AND FUNCTIONAL STATUS - GENERAL
DAILY ACTIVITIY SCORE: 24
CLIMB 3 TO 5 STEPS WITH RAILING: A LITTLE
WALKING IN HOSPITAL ROOM: A LITTLE
MOBILITY SCORE: 24
MOVING TO AND FROM BED TO CHAIR: A LITTLE
TOILETING: A LITTLE
DAILY ACTIVITIY SCORE: 23
TURNING FROM BACK TO SIDE WHILE IN FLAT BAD: A LITTLE
MOBILITY SCORE: 20

## 2023-12-02 ASSESSMENT — PAIN - FUNCTIONAL ASSESSMENT
PAIN_FUNCTIONAL_ASSESSMENT: 0-10
PAIN_FUNCTIONAL_ASSESSMENT: 0-10
PAIN_FUNCTIONAL_ASSESSMENT: FLACC (FACE, LEGS, ACTIVITY, CRY, CONSOLABILITY)
PAIN_FUNCTIONAL_ASSESSMENT: 0-10

## 2023-12-02 ASSESSMENT — PAIN SCALES - GENERAL
PAINLEVEL_OUTOF10: 0 - NO PAIN

## 2023-12-02 ASSESSMENT — ACTIVITIES OF DAILY LIVING (ADL): LACK_OF_TRANSPORTATION: NO

## 2023-12-02 NOTE — NURSING NOTE
Patient awake and has eaten dinner. Patient has remained without complaints. 3 way irrigation continues with cherry colored urine noted in meza bag. No clots noted. Continue to monitor.

## 2023-12-02 NOTE — NURSING NOTE
0700pm Pt aware of transfusion awaiting lab for blood to become ready, no blood clots noted with CBI will continue to monitor.

## 2023-12-02 NOTE — PROGRESS NOTES
"Laurence Campbell is a 58 y.o. female on day 5 of admission presenting with Severe anemia.    Subjective    patient continues to have significant hematuria and drop in her hemoglobin   is on three-way irrigation   will have another unit of blood today       Objective     Physical Exam   patient is alert in no acute distress   lungs are clear to auscultation and percussion   cardiac is regular rate and rhythm normal S1-S2 without murmur gallop rub click S3 or S4   abdomen is soft nontender positive bowel sounds there is no hepatosplenomegaly or CVA tenderness appreciated   :   Three-way Chowdhury in place draining bright red blood- no clots appreciated  Last Recorded Vitals  Blood pressure 110/60, pulse 92, temperature 36.2 °C (97.2 °F), temperature source Temporal, resp. rate 16, height 1.702 m (5' 7.01\"), weight 75.3 kg (166 lb), SpO2 100 %.  Intake/Output last 3 Shifts:  I/O last 3 completed shifts:  In: 3625.4 (48.1 mL/kg) [P.O.:1440; I.V.:1825 (24.2 mL/kg); Blood:360.4]  Out: 7150 (95 mL/kg) [Urine:7150 (2.6 mL/kg/hr)]  Weight: 75.3 kg     Relevant Results                Current Facility-Administered Medications:     acetaminophen (Tylenol) tablet 650 mg, 650 mg, oral, q4h PRN **OR** acetaminophen (Tylenol) oral liquid 650 mg, 650 mg, oral, q4h PRN **OR** acetaminophen (Tylenol) suppository 650 mg, 650 mg, rectal, q4h PRN, Yu Landis PA-C    acyclovir (Zovirax) capsule 400 mg, 400 mg, oral, BID, Yu Landis PA-C, 400 mg at 12/02/23 0825    alum-mag hydroxide-simeth (Mylanta) 200-200-20 mg/5 mL oral suspension 20 mL, 20 mL, oral, q4h PRN, Zahida Pruett MD    benzocaine-menthol (Cepastat Sore Throat) 15-3.6 mg lozenge 1 lozenge, 1 lozenge, Mouth/Throat, q2h PRN, Zahida Pruett MD    benzonatate (Tessalon) capsule 100 mg, 100 mg, oral, TID PRN, Zahida Pruett MD    ferrous sulfate (325 mg ferrous sulfate) tablet 0.5 tablet, 32.5 mg of iron, oral, Daily with breakfast, Yu Landis, " PA-C, 0.5 tablet at 12/02/23 0824    lactated Ringer's infusion, 125 mL/hr, intravenous, Continuous, Yu Landis PA-C, Last Rate: 125 mL/hr at 12/02/23 0746, 125 mL/hr at 12/02/23 0746    levoFLOXacin (Levaquin) tablet 500 mg, 500 mg, oral, q24h ARIANNA, Yu Landis PA-C, 500 mg at 12/01/23 1644    melatonin tablet 3 mg, 3 mg, oral, Daily PRN, Yu Landis PA-C    ondansetron ODT (Zofran-ODT) disintegrating tablet 4 mg, 4 mg, oral, q8h PRN **OR** ondansetron (Zofran) injection 4 mg, 4 mg, intravenous, q8h PRN, Yu Landis PA-C    polyethylene glycol (Glycolax, Miralax) packet 17 g, 17 g, oral, Daily, Yu Landis PA-C, 17 g at 12/02/23 0825    polyvinyl alcohol-povidone 0.5-0.6 % ophthalmic solution 1 drop, 1 drop, Both Eyes, PRN, Zahida Pruett MD    sodium chloride (Ocean) 0.65 % nasal spray 1 spray, 1 spray, Each Nostril, PRN, Zahida Pruett MD     Results for orders placed or performed during the hospital encounter of 11/27/23 (from the past 24 hour(s))   Type and screen   Result Value Ref Range    ABO TYPE O     Rh TYPE POS     ANTIBODY SCREEN NEG    Prepare RBC: 1 Units, Irradiated   Result Value Ref Range    PRODUCT CODE C5505U48     Unit Number [id number]     Unit ABO O     Unit RH POS     XM INTEP COMP     Dispense Status IS     Blood Expiration Date December 25, 2023 23:59 EST     PRODUCT BLOOD TYPE 5100     UNIT VOLUME 350    CBC   Result Value Ref Range    WBC 8.0 4.4 - 11.3 x10*3/uL    nRBC      RBC 2.77 (L) 4.00 - 5.20 x10*6/uL    Hemoglobin 7.5 (L) 12.0 - 16.0 g/dL    Hematocrit 24.0 (L) 36.0 - 46.0 %    MCV 87 80 - 100 fL    MCH 27.1 26.0 - 34.0 pg    MCHC 31.3 (L) 32.0 - 36.0 g/dL    RDW 18.2 (H) 11.5 - 14.5 %    Platelets 309 150 - 450 x10*3/uL   Basic Metabolic Panel   Result Value Ref Range    Glucose 103 (H) 74 - 99 mg/dL    Sodium 137 136 - 145 mmol/L    Potassium 4.3 3.5 - 5.3 mmol/L    Chloride 108 (H) 98 - 107 mmol/L    Bicarbonate 23 21 - 32 mmol/L     Anion Gap 10 10 - 20 mmol/L    Urea Nitrogen 14 6 - 23 mg/dL    Creatinine 0.93 0.50 - 1.05 mg/dL    eGFR 72 >60 mL/min/1.73m*2    Calcium 8.4 (L) 8.6 - 10.3 mg/dL               Assessment/Plan   Principal Problem:    Severe anemia     patient has severe anemia secondary to hematuria thought secondary to radiation cystitis   patient has  three-way Chowdhury in place continues to drain bright red blood despite TXA treatment   will transfuse another unit of packed cells today     UTI   currently on Levaquin 500 mg daily   will complete 7-day course then DC       I spent 35 minutes in the professional and overall care of this patient.      Marcela Muhammad MD

## 2023-12-02 NOTE — NURSING NOTE
Assumed care of patient this am. Patient voices no c/o pain/discomfort. No c/o any type voiced. Ace wrap intact to left leg. Patient declined ice pack to left knee/leg.Patient with positive motion/senstaion. Call light within reach, continue to monitor.

## 2023-12-02 NOTE — NURSING NOTE
Patient remains without complaints. Chowdhury irrigation continues at this time.   Continue to monitor.

## 2023-12-02 NOTE — CARE PLAN
The patient's goals for the shift include      The clinical goals for the shift include decrease blood in urine

## 2023-12-02 NOTE — NURSING NOTE
Resting in bed with call light within reach and bed in lowest/locked position.  Bed alarm on.  Voices no c/o pain at this time.  Continuous bladder irrigation continues via three way meza catheter with pink urine returned.

## 2023-12-02 NOTE — PROGRESS NOTES
RN TCC spoke with pt.  Introduction of self and explanation of dc planning role provided and pt agreed to proceed.   Updated IMM acknowledged.    RN TCC following for dc planning needs.  Anticipate home with NN at this time.

## 2023-12-02 NOTE — NURSING NOTE
Assumed care of patient this am. Patient voicing no c/o pain/discomfort. No  c/o any type voiced. Patient with a 3 way meza with continuous. Cherry colored urine noted in meza bag, no clots noted.  IVF infusing per order.   Call light within reach, continue to monitor.

## 2023-12-02 NOTE — NURSING NOTE
Saline lock noted in left hand noted to be leaking. Saline lock removed and new lock placed in left forearm.  Left hand hand without redness/swelling. Pressure dressing applied to the site, Call light within reach, continue to monitor.

## 2023-12-02 NOTE — PROGRESS NOTES
Patient seen, chart reviewed.  Still mildly tachycardic, but otherwise vital signs are stable.  Patient notes that her urine has been lightening in color.  Denies any clotting today.  No acute complaints.      Zahida Pruett MD

## 2023-12-02 NOTE — NURSING NOTE
Resting in bed with eyes closed and call light within reach.  Blood infusing as per order, no signs or symptoms of blood reaction noted.  Continuous bladder irrigation continues via 3-way meza catheter with pink urine returned in drainage bag.  Respirations even and unlabored.

## 2023-12-03 LAB
ANION GAP SERPL CALC-SCNC: 10 MMOL/L (ref 10–20)
APTT PPP: 23.7 SECONDS (ref 22–32.5)
BUN SERPL-MCNC: 12 MG/DL (ref 6–23)
CALCIUM SERPL-MCNC: 8.7 MG/DL (ref 8.6–10.3)
CHLORIDE SERPL-SCNC: 110 MMOL/L (ref 98–107)
CO2 SERPL-SCNC: 22 MMOL/L (ref 21–32)
CREAT SERPL-MCNC: 0.92 MG/DL (ref 0.5–1.05)
ERYTHROCYTE [DISTWIDTH] IN BLOOD BY AUTOMATED COUNT: 17.5 % (ref 11.5–14.5)
GFR SERPL CREATININE-BSD FRML MDRD: 73 ML/MIN/1.73M*2
GLUCOSE SERPL-MCNC: 99 MG/DL (ref 74–99)
HCT VFR BLD AUTO: 22.1 % (ref 36–46)
HCT VFR BLD AUTO: 29.2 % (ref 36–46)
HGB BLD-MCNC: 6.8 G/DL (ref 12–16)
HGB BLD-MCNC: 9 G/DL (ref 12–16)
INR PPP: 1 (ref 0.9–1.2)
MCH RBC QN AUTO: 27.1 PG (ref 26–34)
MCHC RBC AUTO-ENTMCNC: 30.8 G/DL (ref 32–36)
MCV RBC AUTO: 88 FL (ref 80–100)
NRBC BLD-RTO: ABNORMAL /100{WBCS}
PLATELET # BLD AUTO: 321 X10*3/UL (ref 150–450)
POTASSIUM SERPL-SCNC: 4.2 MMOL/L (ref 3.5–5.3)
PROTHROMBIN TIME: 9.4 SECONDS (ref 9.3–12.7)
RBC # BLD AUTO: 2.51 X10*6/UL (ref 4–5.2)
SODIUM SERPL-SCNC: 138 MMOL/L (ref 136–145)
WBC # BLD AUTO: 9.4 X10*3/UL (ref 4.4–11.3)

## 2023-12-03 PROCEDURE — 2500000004 HC RX 250 GENERAL PHARMACY W/ HCPCS (ALT 636 FOR OP/ED): Performed by: PHYSICIAN ASSISTANT

## 2023-12-03 PROCEDURE — 36430 TRANSFUSION BLD/BLD COMPNT: CPT

## 2023-12-03 PROCEDURE — 1200000002 HC GENERAL ROOM WITH TELEMETRY DAILY

## 2023-12-03 PROCEDURE — 2500000001 HC RX 250 WO HCPCS SELF ADMINISTERED DRUGS (ALT 637 FOR MEDICARE OP): Performed by: PHYSICIAN ASSISTANT

## 2023-12-03 PROCEDURE — 36415 COLL VENOUS BLD VENIPUNCTURE: CPT | Performed by: PHYSICIAN ASSISTANT

## 2023-12-03 PROCEDURE — 80048 BASIC METABOLIC PNL TOTAL CA: CPT | Performed by: PHYSICIAN ASSISTANT

## 2023-12-03 PROCEDURE — 36415 COLL VENOUS BLD VENIPUNCTURE: CPT | Performed by: EMERGENCY MEDICINE

## 2023-12-03 PROCEDURE — 85018 HEMOGLOBIN: CPT | Performed by: EMERGENCY MEDICINE

## 2023-12-03 PROCEDURE — 85027 COMPLETE CBC AUTOMATED: CPT | Performed by: PHYSICIAN ASSISTANT

## 2023-12-03 PROCEDURE — 85730 THROMBOPLASTIN TIME PARTIAL: CPT | Performed by: EMERGENCY MEDICINE

## 2023-12-03 PROCEDURE — P9040 RBC LEUKOREDUCED IRRADIATED: HCPCS

## 2023-12-03 RX ADMIN — LEVOFLOXACIN 500 MG: 500 TABLET, FILM COATED ORAL at 16:33

## 2023-12-03 RX ADMIN — ACYCLOVIR 400 MG: 200 CAPSULE ORAL at 08:22

## 2023-12-03 RX ADMIN — ACYCLOVIR 400 MG: 200 CAPSULE ORAL at 20:32

## 2023-12-03 RX ADMIN — FERROUS SULFATE TAB 325 MG (65 MG ELEMENTAL FE) 0.5 TABLET: 325 (65 FE) TAB at 08:23

## 2023-12-03 RX ADMIN — POLYETHYLENE GLYCOL 3350 17 G: 17 POWDER, FOR SOLUTION ORAL at 08:24

## 2023-12-03 ASSESSMENT — PAIN SCALES - GENERAL
PAINLEVEL_OUTOF10: 0 - NO PAIN

## 2023-12-03 ASSESSMENT — COGNITIVE AND FUNCTIONAL STATUS - GENERAL
DAILY ACTIVITIY SCORE: 24
MOBILITY SCORE: 20
TURNING FROM BACK TO SIDE WHILE IN FLAT BAD: A LITTLE
WALKING IN HOSPITAL ROOM: A LITTLE
CLIMB 3 TO 5 STEPS WITH RAILING: A LITTLE
MOBILITY SCORE: 24
DAILY ACTIVITIY SCORE: 23
MOVING TO AND FROM BED TO CHAIR: A LITTLE
TOILETING: A LITTLE

## 2023-12-03 ASSESSMENT — PAIN - FUNCTIONAL ASSESSMENT
PAIN_FUNCTIONAL_ASSESSMENT: 0-10

## 2023-12-03 NOTE — CONSULTS
"Consults    Reason For Consult  Management of severe anemia secondary to hematuria    History Of Present Illness  Laurence Campbell is a 58 y.o. female presenting with severe anemia secondary to hematuria     Past Medical History  She has a past medical history of Abdominal pain, LLQ (left lower quadrant) (07/18/2023), Acute UTI (07/18/2023), Cellulitis (07/18/2023), Constipation (07/18/2023), Encounter for other preprocedural examination, Left wrist pain (07/18/2023), Right shoulder pain (07/18/2023), Skin irritation (07/18/2023), and Stiffness of right shoulder joint (07/18/2023).    Surgical History  She has a past surgical history that includes Vaginal mass excision (03/09/2015); Other surgical history (11/01/2017); and US guided biopsy lymph node superficial (12/3/2018).     Social History  She reports that she has never smoked. She has never used smokeless tobacco. She reports that she does not drink alcohol and does not use drugs.    Family History  Family History   Problem Relation Name Age of Onset    Endometrial cancer Mother      Hypertension Mother      Obesity Mother      Lung cancer Father      Leukemia Brother      Other (Endometrial carcinoma) Mother          Allergies  Amoxicillin, Alendronate sodium, Alendronic acid, Amoxicillin, Clindamycin, Penicillin, Piperacillin-tazobactam, Sulfa (sulfonamide antibiotics), Sulfa (sulfonamide antibiotics), and Clindamycin    Review of Systems  Noncontributory  Physical Exam  Patient is alert in no acute distress  Lungs are clear to auscultation and percussion  Cardiac is regular rate and rhythm normal S1-S2 without murmur gallop rub click S3 or S4  Abdomen is benign  Chowdhury is in place on continuous bladder irrigation-urine is light pink no clots  Last Recorded Vitals  Blood pressure 112/66, pulse 73, temperature 36.3 °C (97.3 °F), temperature source Temporal, resp. rate 18, height 1.702 m (5' 7.01\"), weight 75.3 kg (166 lb), SpO2 100 %.    Relevant " Results    Current Facility-Administered Medications:     acetaminophen (Tylenol) tablet 650 mg, 650 mg, oral, q4h PRN **OR** acetaminophen (Tylenol) oral liquid 650 mg, 650 mg, oral, q4h PRN **OR** acetaminophen (Tylenol) suppository 650 mg, 650 mg, rectal, q4h PRN, Yu Landis PA-C    acyclovir (Zovirax) capsule 400 mg, 400 mg, oral, BID, Yu Landis PA-C, 400 mg at 12/03/23 0822    alum-mag hydroxide-simeth (Mylanta) 200-200-20 mg/5 mL oral suspension 20 mL, 20 mL, oral, q4h PRN, Zahida Pruett MD    benzocaine-menthol (Cepastat Sore Throat) 15-3.6 mg lozenge 1 lozenge, 1 lozenge, Mouth/Throat, q2h PRN, Zahida Pruett MD    benzonatate (Tessalon) capsule 100 mg, 100 mg, oral, TID PRN, Zahida Pruett MD    ferrous sulfate (325 mg ferrous sulfate) tablet 0.5 tablet, 32.5 mg of iron, oral, Daily with breakfast, Yu Landis PA-C, 0.5 tablet at 12/03/23 0823    levoFLOXacin (Levaquin) tablet 500 mg, 500 mg, oral, q24h ARIANNA, Yu Landis PA-C, 500 mg at 12/02/23 1749    melatonin tablet 3 mg, 3 mg, oral, Daily PRN, Yu Landis PA-C    ondansetron ODT (Zofran-ODT) disintegrating tablet 4 mg, 4 mg, oral, q8h PRN **OR** ondansetron (Zofran) injection 4 mg, 4 mg, intravenous, q8h PRN, Yu Landis PA-C    polyethylene glycol (Glycolax, Miralax) packet 17 g, 17 g, oral, Daily, Yu Landis PA-C, 17 g at 12/03/23 0824    polyvinyl alcohol-povidone 0.5-0.6 % ophthalmic solution 1 drop, 1 drop, Both Eyes, PRN, Zahida Pruett MD    sodium chloride (Ocean) 0.65 % nasal spray 1 spray, 1 spray, Each Nostril, PRN, Zahida Pruett MD   Results for orders placed or performed during the hospital encounter of 11/27/23 (from the past 24 hour(s))   CBC   Result Value Ref Range    WBC 9.4 4.4 - 11.3 x10*3/uL    nRBC      RBC 2.51 (L) 4.00 - 5.20 x10*6/uL    Hemoglobin 6.8 (L) 12.0 - 16.0 g/dL    Hematocrit 22.1 (L) 36.0 - 46.0 %    MCV 88 80 - 100 fL    MCH 27.1 26.0 - 34.0 pg     MCHC 30.8 (L) 32.0 - 36.0 g/dL    RDW 17.5 (H) 11.5 - 14.5 %    Platelets 321 150 - 450 x10*3/uL   Basic Metabolic Panel   Result Value Ref Range    Glucose 99 74 - 99 mg/dL    Sodium 138 136 - 145 mmol/L    Potassium 4.2 3.5 - 5.3 mmol/L    Chloride 110 (H) 98 - 107 mmol/L    Bicarbonate 22 21 - 32 mmol/L    Anion Gap 10 10 - 20 mmol/L    Urea Nitrogen 12 6 - 23 mg/dL    Creatinine 0.92 0.50 - 1.05 mg/dL    eGFR 73 >60 mL/min/1.73m*2    Calcium 8.7 8.6 - 10.3 mg/dL   Prepare RBC: 1 Units, Irradiated   Result Value Ref Range    PRODUCT CODE F0591O19     Unit Number [id number]     Unit ABO O     Unit RH POS     XM INTEP COMP     Dispense Status XM     Blood Expiration Date December 25, 2023 23:59 EST     PRODUCT BLOOD TYPE 5100     UNIT VOLUME 350    Prepare RBC   Result Value Ref Range    PRODUCT CODE V1061E05     Unit Number [id number]     Unit ABO O     Unit RH POS     XM INTEP COMP     Dispense Status IS     Blood Expiration Date December 25, 2023 23:59 EST     PRODUCT BLOOD TYPE      UNIT VOLUME 350    Coagulation Screen   Result Value Ref Range    Protime 9.4 9.3 - 12.7 seconds    INR 1.0 0.9 - 1.2    aPTT 23.7 22.0 - 32.5 seconds         Assessment/Plan   Patient completed IV infusion of Amicar yesterday; urine has improved and is now light pink no clots however hemoglobin is 6.8.  Will transfuse packed red blood cells  To be the sixth unit of packed red cells-coags are normal do not believe she needs FFP at this time  Will DC continuous bladder irrigation today and continue to monitor      I spent 40 minutes in the professional and overall care of this patient.

## 2023-12-03 NOTE — NURSING NOTE
0915am PRBC started infusing without difficulty no adverse reactions noted at this time continuing to monitor pt, RN in room pt voiced no reactions VS wnl.

## 2023-12-03 NOTE — NURSING NOTE
0745 am Assumed care of pt, A/Ox 4 denies any c/o pain no distress noted. Pt has meza with CBI with urine noted red in color no lots noted. Pt to receive transfusion of 1 unit this shift advised and made aware call light in reach will continue to monitor.

## 2023-12-03 NOTE — NURSING NOTE
Pt. is currently awake in bed. She has no c/o pain or discomfort at this time. Bladder irrigation continues via 3-way Chowdhury. Clear, pink output is noted in the drainage bag. IV Amicar infusing via L FA IV line. Assessment as charted. She has no questions or concerns at this time. Call light and personal belongings placed within reach.

## 2023-12-03 NOTE — PROGRESS NOTES
"Amicar completed, urine much clearer, getting another uint prbc today  Blood pressure 119/59, pulse 71, temperature 36.4 °C (97.5 °F), temperature source Temporal, resp. rate 18, height 1.702 m (5' 7.01\"), weight 75.3 kg (166 lb), SpO2 100 %.     Results for orders placed or performed during the hospital encounter of 11/27/23 (from the past 24 hour(s))   CBC   Result Value Ref Range    WBC 9.4 4.4 - 11.3 x10*3/uL    nRBC      RBC 2.51 (L) 4.00 - 5.20 x10*6/uL    Hemoglobin 6.8 (L) 12.0 - 16.0 g/dL    Hematocrit 22.1 (L) 36.0 - 46.0 %    MCV 88 80 - 100 fL    MCH 27.1 26.0 - 34.0 pg    MCHC 30.8 (L) 32.0 - 36.0 g/dL    RDW 17.5 (H) 11.5 - 14.5 %    Platelets 321 150 - 450 x10*3/uL   Basic Metabolic Panel   Result Value Ref Range    Glucose 99 74 - 99 mg/dL    Sodium 138 136 - 145 mmol/L    Potassium 4.2 3.5 - 5.3 mmol/L    Chloride 110 (H) 98 - 107 mmol/L    Bicarbonate 22 21 - 32 mmol/L    Anion Gap 10 10 - 20 mmol/L    Urea Nitrogen 12 6 - 23 mg/dL    Creatinine 0.92 0.50 - 1.05 mg/dL    eGFR 73 >60 mL/min/1.73m*2    Calcium 8.7 8.6 - 10.3 mg/dL   Prepare RBC: 1 Units, Irradiated   Result Value Ref Range    PRODUCT CODE A9311K60     Unit Number [id number]     Unit ABO O     Unit RH POS     XM INTEP COMP     Dispense Status XM     Blood Expiration Date December 25, 2023 23:59 EST     PRODUCT BLOOD TYPE 5100     UNIT VOLUME 350    Prepare RBC   Result Value Ref Range    PRODUCT CODE L0513C37     Unit Number [id number]     Unit ABO O     Unit RH POS     XM INTEP COMP     Dispense Status IS     Blood Expiration Date December 25, 2023 23:59 EST     PRODUCT BLOOD TYPE      UNIT VOLUME 350    Coagulation Screen   Result Value Ref Range    Protime 9.4 9.3 - 12.7 seconds    INR 1.0 0.9 - 1.2    aPTT 23.7 22.0 - 32.5 seconds    PE:  awake, alert, pleasant  Urine is plepink on minimal irrig    A/P:  recommend stopping irrigation, today if remains clear and meza removal if clear persists and we will refer to [facility] " [facility]

## 2023-12-03 NOTE — PROGRESS NOTES
"Urine light pink on irrigation, has been stable, received last transfusion 12/1, denies pain  Discussed in detail next steps    Blood pressure 107/54, pulse 75, temperature 36.3 °C (97.3 °F), temperature source Temporal, resp. rate 16, height 1.702 m (5' 7.01\"), weight 75.3 kg (166 lb), SpO2 100 %.   Results for orders placed or performed during the hospital encounter of 11/27/23 (from the past 24 hour(s))   CBC   Result Value Ref Range    WBC 9.4 4.4 - 11.3 x10*3/uL    nRBC      RBC 2.51 (L) 4.00 - 5.20 x10*6/uL    Hemoglobin 6.8 (L) 12.0 - 16.0 g/dL    Hematocrit 22.1 (L) 36.0 - 46.0 %    MCV 88 80 - 100 fL    MCH 27.1 26.0 - 34.0 pg    MCHC 30.8 (L) 32.0 - 36.0 g/dL    RDW 17.5 (H) 11.5 - 14.5 %    Platelets 321 150 - 450 x10*3/uL   Basic Metabolic Panel   Result Value Ref Range    Glucose 99 74 - 99 mg/dL    Sodium 138 136 - 145 mmol/L    Potassium 4.2 3.5 - 5.3 mmol/L    Chloride 110 (H) 98 - 107 mmol/L    Bicarbonate 22 21 - 32 mmol/L    Anion Gap 10 10 - 20 mmol/L    Urea Nitrogen 12 6 - 23 mg/dL    Creatinine 0.92 0.50 - 1.05 mg/dL    eGFR 73 >60 mL/min/1.73m*2    Calcium 8.7 8.6 - 10.3 mg/dL   Prepare RBC: 1 Units, Irradiated   Result Value Ref Range    PRODUCT CODE C2877F46     Unit Number [id number]     Unit ABO O     Unit RH POS     XM INTEP COMP     Dispense Status XM     Blood Expiration Date December 25, 2023 23:59 EST     PRODUCT BLOOD TYPE 5100     UNIT VOLUME 350    Prepare RBC   Result Value Ref Range    PRODUCT CODE W7932E64     Unit Number [id number]     Unit ABO O     Unit RH POS     XM INTEP COMP     Dispense Status XM     Blood Expiration Date December 25, 2023 23:59 EST     PRODUCT BLOOD TYPE      UNIT VOLUME 350         A/P:  once hematuria has stabilized, ok to remove catheter and will have our office send referral to [facility]  "

## 2023-12-03 NOTE — NURSING NOTE
0120pm Pt's CBI stopped at this time urine output light pink in color repeat H/H redrawn post transfusion will continue to monitor.

## 2023-12-03 NOTE — CARE PLAN
The patient's goals for the shift include pain free    The clinical goals for the shift include free from pain    Over the shift, the patient did make progress toward the following goals. Patient remained pain free.  Problem: Fall/Injury  Goal: Pace activities to prevent fatigue by end of the shift  Outcome: Progressing     Problem: Pain  Goal: Takes deep breaths with improved pain control throughout the shift  Outcome: Met     Problem: Pain  Goal: Walks with improved pain control throughout the shift  Outcome: Met     Problem: Pain  Goal: Participates in PT with improved pain control throughout the shift  Outcome: Met     Problem: Pain  Goal: Free from opioid side effects throughout the shift  Outcome: Met

## 2023-12-03 NOTE — PROGRESS NOTES
Patient seen, chart reviewed.  Amicar infusion almost complete.  Patient notes that her urine color is lightening.  She has no acute complaints.  Vital signs are stable.      Zahida Pruett MD

## 2023-12-03 NOTE — CARE PLAN
Problem: Fall/Injury  Goal: Use assistive devices by end of the shift  Outcome: Progressing  Goal: Pace activities to prevent fatigue by end of the shift  Outcome: Progressing     Problem: Pain - Adult  Goal: Verbalizes/displays adequate comfort level or baseline comfort level  Outcome: Progressing     Problem: Safety - Adult  Goal: Free from fall injury  Outcome: Progressing     Problem: Discharge Planning  Goal: Discharge to home or other facility with appropriate resources  Outcome: Progressing     Problem: Chronic Conditions and Co-morbidities  Goal: Patient's chronic conditions and co-morbidity symptoms are monitored and maintained or improved  Outcome: Progressing     Problem: Indwelling Catheter Maintenance  Goal: I will have no complications from indwelling catheter  Outcome: Progressing   The patient's goals for the shift include pain free    The clinical goals for the shift include a decrease of blood in the urine

## 2023-12-04 LAB
ANION GAP SERPL CALC-SCNC: 10 MMOL/L (ref 10–20)
BASOPHILS # BLD AUTO: 0.01 X10*3/UL (ref 0–0.1)
BASOPHILS NFR BLD AUTO: 0.1 %
BLOOD EXPIRATION DATE: NORMAL
BUN SERPL-MCNC: 13 MG/DL (ref 6–23)
CALCIUM SERPL-MCNC: 8.8 MG/DL (ref 8.6–10.3)
CHLORIDE SERPL-SCNC: 108 MMOL/L (ref 98–107)
CO2 SERPL-SCNC: 24 MMOL/L (ref 21–32)
CREAT SERPL-MCNC: 0.89 MG/DL (ref 0.5–1.05)
DISPENSE STATUS: NORMAL
EOSINOPHIL # BLD AUTO: 0.25 X10*3/UL (ref 0–0.7)
EOSINOPHIL NFR BLD AUTO: 2.8 %
ERYTHROCYTE [DISTWIDTH] IN BLOOD BY AUTOMATED COUNT: 16.5 % (ref 11.5–14.5)
GFR SERPL CREATININE-BSD FRML MDRD: 76 ML/MIN/1.73M*2
GLUCOSE SERPL-MCNC: 98 MG/DL (ref 74–99)
HCT VFR BLD AUTO: 26.1 % (ref 36–46)
HCT VFR BLD AUTO: 29.3 % (ref 36–46)
HGB BLD-MCNC: 8.3 G/DL (ref 12–16)
HGB BLD-MCNC: 9.1 G/DL (ref 12–16)
IMM GRANULOCYTES # BLD AUTO: 0.03 X10*3/UL (ref 0–0.7)
IMM GRANULOCYTES NFR BLD AUTO: 0.3 % (ref 0–0.9)
LYMPHOCYTES # BLD AUTO: 1.39 X10*3/UL (ref 1.2–4.8)
LYMPHOCYTES NFR BLD AUTO: 15.4 %
MCH RBC QN AUTO: 27.7 PG (ref 26–34)
MCHC RBC AUTO-ENTMCNC: 31.8 G/DL (ref 32–36)
MCV RBC AUTO: 87 FL (ref 80–100)
MONOCYTES # BLD AUTO: 0.77 X10*3/UL (ref 0.1–1)
MONOCYTES NFR BLD AUTO: 8.5 %
NEUTROPHILS # BLD AUTO: 6.58 X10*3/UL (ref 1.2–7.7)
NEUTROPHILS NFR BLD AUTO: 72.9 %
NRBC BLD-RTO: ABNORMAL /100{WBCS}
PLATELET # BLD AUTO: 349 X10*3/UL (ref 150–450)
POTASSIUM SERPL-SCNC: 4.1 MMOL/L (ref 3.5–5.3)
PRODUCT BLOOD TYPE: NORMAL
PRODUCT CODE: NORMAL
RBC # BLD AUTO: 3 X10*6/UL (ref 4–5.2)
SODIUM SERPL-SCNC: 138 MMOL/L (ref 136–145)
UNIT ABO: NORMAL
UNIT NUMBER: NORMAL
UNIT RH: NORMAL
UNIT VOLUME: 350
WBC # BLD AUTO: 9 X10*3/UL (ref 4.4–11.3)
XM INTEP: NORMAL

## 2023-12-04 PROCEDURE — 80048 BASIC METABOLIC PNL TOTAL CA: CPT | Performed by: STUDENT IN AN ORGANIZED HEALTH CARE EDUCATION/TRAINING PROGRAM

## 2023-12-04 PROCEDURE — 36415 COLL VENOUS BLD VENIPUNCTURE: CPT | Performed by: STUDENT IN AN ORGANIZED HEALTH CARE EDUCATION/TRAINING PROGRAM

## 2023-12-04 PROCEDURE — 85014 HEMATOCRIT: CPT | Performed by: PHYSICIAN ASSISTANT

## 2023-12-04 PROCEDURE — 36415 COLL VENOUS BLD VENIPUNCTURE: CPT | Performed by: PHYSICIAN ASSISTANT

## 2023-12-04 PROCEDURE — 2500000001 HC RX 250 WO HCPCS SELF ADMINISTERED DRUGS (ALT 637 FOR MEDICARE OP): Performed by: PHYSICIAN ASSISTANT

## 2023-12-04 PROCEDURE — 1200000002 HC GENERAL ROOM WITH TELEMETRY DAILY

## 2023-12-04 PROCEDURE — 2500000004 HC RX 250 GENERAL PHARMACY W/ HCPCS (ALT 636 FOR OP/ED): Performed by: PHYSICIAN ASSISTANT

## 2023-12-04 PROCEDURE — 85025 COMPLETE CBC W/AUTO DIFF WBC: CPT | Performed by: STUDENT IN AN ORGANIZED HEALTH CARE EDUCATION/TRAINING PROGRAM

## 2023-12-04 RX ADMIN — ACYCLOVIR 400 MG: 200 CAPSULE ORAL at 08:16

## 2023-12-04 RX ADMIN — LEVOFLOXACIN 500 MG: 500 TABLET, FILM COATED ORAL at 15:10

## 2023-12-04 RX ADMIN — ACYCLOVIR 400 MG: 200 CAPSULE ORAL at 20:25

## 2023-12-04 RX ADMIN — FERROUS SULFATE TAB 325 MG (65 MG ELEMENTAL FE) 0.5 TABLET: 325 (65 FE) TAB at 08:17

## 2023-12-04 RX ADMIN — POLYETHYLENE GLYCOL 3350 17 G: 17 POWDER, FOR SOLUTION ORAL at 08:16

## 2023-12-04 ASSESSMENT — COGNITIVE AND FUNCTIONAL STATUS - GENERAL
DAILY ACTIVITIY SCORE: 24
DAILY ACTIVITIY SCORE: 24
WALKING IN HOSPITAL ROOM: A LITTLE
MOBILITY SCORE: 23
MOBILITY SCORE: 23
WALKING IN HOSPITAL ROOM: A LITTLE

## 2023-12-04 ASSESSMENT — PAIN - FUNCTIONAL ASSESSMENT
PAIN_FUNCTIONAL_ASSESSMENT: 0-10

## 2023-12-04 ASSESSMENT — PAIN SCALES - GENERAL
PAINLEVEL_OUTOF10: 0 - NO PAIN

## 2023-12-04 NOTE — CARE PLAN
Problem: Fall/Injury  Goal: Use assistive devices by end of the shift  Outcome: Progressing  Goal: Pace activities to prevent fatigue by end of the shift  Outcome: Progressing     Problem: Pain - Adult  Goal: Verbalizes/displays adequate comfort level or baseline comfort level  Outcome: Progressing     Problem: Safety - Adult  Goal: Free from fall injury  Outcome: Progressing     Problem: Discharge Planning  Goal: Discharge to home or other facility with appropriate resources  Outcome: Progressing     Problem: Chronic Conditions and Co-morbidities  Goal: Patient's chronic conditions and co-morbidity symptoms are monitored and maintained or improved  Outcome: Progressing     Problem: Indwelling Catheter Maintenance  Goal: I will have no complications from indwelling catheter  Outcome: Progressing   The patient's goals for the shift include pain free    The clinical goals for the shift include improve hemoglobin levels

## 2023-12-04 NOTE — NURSING NOTE
Assumed care of patient this am. Patient lying in bed watching television. She voices no c/o pain/discomfort. No c/o any type voiced. Chowdhury to CD with dark cherry colored urine noted in the drainage bag. Call light within reach, continue to monitor.

## 2023-12-04 NOTE — NURSING NOTE
Assumed care of patient this am. Patient voices no c/o pain/discomfort. No c/o any type voiced. Chowdhury to cd with dark cherry colored urine noted in the bag. Call light within reach, continue to monitor.

## 2023-12-04 NOTE — PROGRESS NOTES
"Pt is feeling good, appetite is good, no pain, has picked up a bit of hematuria w irrigation off 24 hours, received prbc yesterday    Blood pressure 126/67, pulse 84, temperature 36.7 °C (98.1 °F), temperature source Temporal, resp. rate 16, height 1.702 m (5' 7.01\"), weight 75.3 kg (166 lb), SpO2 100 %.     Results for orders placed or performed during the hospital encounter of 11/27/23 (from the past 24 hour(s))   Hemoglobin and Hematocrit   Result Value Ref Range    Hemoglobin 9.0 (L) 12.0 - 16.0 g/dL    Hematocrit 29.2 (L) 36.0 - 46.0 %   CBC and Auto Differential   Result Value Ref Range    WBC 9.0 4.4 - 11.3 x10*3/uL    nRBC      RBC 3.00 (L) 4.00 - 5.20 x10*6/uL    Hemoglobin 8.3 (L) 12.0 - 16.0 g/dL    Hematocrit 26.1 (L) 36.0 - 46.0 %    MCV 87 80 - 100 fL    MCH 27.7 26.0 - 34.0 pg    MCHC 31.8 (L) 32.0 - 36.0 g/dL    RDW 16.5 (H) 11.5 - 14.5 %    Platelets 349 150 - 450 x10*3/uL    Neutrophils % 72.9 40.0 - 80.0 %    Immature Granulocytes %, Automated 0.3 0.0 - 0.9 %    Lymphocytes % 15.4 13.0 - 44.0 %    Monocytes % 8.5 2.0 - 10.0 %    Eosinophils % 2.8 0.0 - 6.0 %    Basophils % 0.1 0.0 - 2.0 %    Neutrophils Absolute 6.58 1.20 - 7.70 x10*3/uL    Immature Granulocytes Absolute, Automated 0.03 0.00 - 0.70 x10*3/uL    Lymphocytes Absolute 1.39 1.20 - 4.80 x10*3/uL    Monocytes Absolute 0.77 0.10 - 1.00 x10*3/uL    Eosinophils Absolute 0.25 0.00 - 0.70 x10*3/uL    Basophils Absolute 0.01 0.00 - 0.10 x10*3/uL   Basic Metabolic Panel   Result Value Ref Range    Glucose 98 74 - 99 mg/dL    Sodium 138 136 - 145 mmol/L    Potassium 4.1 3.5 - 5.3 mmol/L    Chloride 108 (H) 98 - 107 mmol/L    Bicarbonate 24 21 - 32 mmol/L    Anion Gap 10 10 - 20 mmol/L    Urea Nitrogen 13 6 - 23 mg/dL    Creatinine 0.89 0.50 - 1.05 mg/dL    eGFR 76 >60 mL/min/1.73m*2    Calcium 8.8 8.6 - 10.3 mg/dL    PE:  awake, alert and pleasant  No sob nor wheezes, urine is light red w few small chata like clots in tubing    A/P;  if improves " even a bit today would discontinue meza and allow discharge, will send request to [facility] from our office

## 2023-12-04 NOTE — PROGRESS NOTES
Patient seen, chart reviewed.  CBI turned off, patient is draining clear, light red urine with some sediment.  No clots appreciated.  Patient has no acute complaints.  Vital signs are stable.  Posttransfusion hemoglobin today was 9.      Zahida Pruett MD

## 2023-12-04 NOTE — NURSING NOTE
Patient siting up in bed talking on the phone.  She remains without complaints. Continue to monitor.

## 2023-12-04 NOTE — PROGRESS NOTES
"  Progress Note:    Laurence Campbell is a 58 y.o. female on day 7 of admission presenting with Severe anemia.    Subjective   Mrs. Garcias reports no pain currently.  The blood in her urine is improving.  She received a second dose of Amicar yesterday.  She reports ambulating, moving her bowels and overall feeling pretty good     ROS  Constitutional:  Alert, oriented  HEENT: Denies headache, vision changes, hearing change, loss of taste or smell. Does complain of sore throat (post op)   Neck: Denies swallowing difficulty, swelling, new stiffness/pain  CV: Denies CP, Palpitations, chest wall pain  Resp: No cough, wheeze, dyspnea  Abdomen: Denies abdominal pain, cramping, constipation, diarrhea  : No dysuria, hematuria, discharge  Musculoskeletal: Aches over all joints (not new). Generalized weakness 2/2 pain  Extremities: Swelling of RLE  Neuro: No focal deficits other than mild [place]    Scheduled medications  acyclovir, 400 mg, oral, BID  ferrous sulfate (325 mg ferrous sulfate), 32.5 mg of iron, oral, Daily with breakfast  levoFLOXacin, 500 mg, oral, q24h ARIANNA  polyethylene glycol, 17 g, oral, Daily         PRN medications  PRN medications: acetaminophen **OR** acetaminophen **OR** acetaminophen, alum-mag hydroxide-simeth, benzocaine-menthol, benzonatate, melatonin, ondansetron ODT **OR** ondansetron, polyvinyl alcohol-povidone, sodium chloride      Physical Exam  General: No acute distress, alert & oriented  Cardiac: RRR, NL S1 and S2, no murmurs, rubs or gallops  Pulmonary: Lungs clear to auscultation bilaterally, no wheezes, rhales or rhonchi  Abdomen: Soft, non-tender, non-distended  Extremities: No clubbing , cyanosis or edema.     Last Recorded Vitals  Blood pressure 107/54, pulse 74, temperature 36.8 °C (98.2 °F), temperature source Oral, resp. rate 16, height 1.702 m (5' 7.01\"), weight 75.3 kg (166 lb), SpO2 100 %.    Scheduled medications   Medication Dose Route Frequency    acyclovir  400 mg oral BID "    ferrous sulfate (325 mg ferrous sulfate)  32.5 mg of iron oral Daily with breakfast    levoFLOXacin  500 mg oral q24h ARIANNA    polyethylene glycol  17 g oral Daily       Relevant Results  Results from last 7 days   Lab Units 12/04/23  0601 12/03/23  1311 12/03/23  0513 12/02/23  0406   WBC AUTO x10*3/uL 9.0  --  9.4 8.0   HEMOGLOBIN g/dL 8.3* 9.0* 6.8* 7.5*   HEMATOCRIT % 26.1* 29.2* 22.1* 24.0*   PLATELETS AUTO x10*3/uL 349  --  321 309      Results from last 7 days   Lab Units 12/04/23  0601 12/03/23  0513 12/02/23  0406   SODIUM mmol/L 138 138 137   POTASSIUM mmol/L 4.1 4.2 4.3   CHLORIDE mmol/L 108* 110* 108*   CO2 mmol/L 24 22 23   BUN mg/dL 13 12 14   CREATININE mg/dL 0.89 0.92 0.93   GLUCOSE mg/dL 98 99 103*   CALCIUM mg/dL 8.8 8.7 8.4*      Results for orders placed or performed during the hospital encounter of 11/27/23 (from the past 24 hour(s))   Hemoglobin and Hematocrit   Result Value Ref Range    Hemoglobin 9.0 (L) 12.0 - 16.0 g/dL    Hematocrit 29.2 (L) 36.0 - 46.0 %   CBC and Auto Differential   Result Value Ref Range    WBC 9.0 4.4 - 11.3 x10*3/uL    nRBC      RBC 3.00 (L) 4.00 - 5.20 x10*6/uL    Hemoglobin 8.3 (L) 12.0 - 16.0 g/dL    Hematocrit 26.1 (L) 36.0 - 46.0 %    MCV 87 80 - 100 fL    MCH 27.7 26.0 - 34.0 pg    MCHC 31.8 (L) 32.0 - 36.0 g/dL    RDW 16.5 (H) 11.5 - 14.5 %    Platelets 349 150 - 450 x10*3/uL    Neutrophils % 72.9 40.0 - 80.0 %    Immature Granulocytes %, Automated 0.3 0.0 - 0.9 %    Lymphocytes % 15.4 13.0 - 44.0 %    Monocytes % 8.5 2.0 - 10.0 %    Eosinophils % 2.8 0.0 - 6.0 %    Basophils % 0.1 0.0 - 2.0 %    Neutrophils Absolute 6.58 1.20 - 7.70 x10*3/uL    Immature Granulocytes Absolute, Automated 0.03 0.00 - 0.70 x10*3/uL    Lymphocytes Absolute 1.39 1.20 - 4.80 x10*3/uL    Monocytes Absolute 0.77 0.10 - 1.00 x10*3/uL    Eosinophils Absolute 0.25 0.00 - 0.70 x10*3/uL    Basophils Absolute 0.01 0.00 - 0.10 x10*3/uL   Basic Metabolic Panel   Result Value Ref Range     Glucose 98 74 - 99 mg/dL    Sodium 138 136 - 145 mmol/L    Potassium 4.1 3.5 - 5.3 mmol/L    Chloride 108 (H) 98 - 107 mmol/L    Bicarbonate 24 21 - 32 mmol/L    Anion Gap 10 10 - 20 mmol/L    Urea Nitrogen 13 6 - 23 mg/dL    Creatinine 0.89 0.50 - 1.05 mg/dL    eGFR 76 >60 mL/min/1.73m*2    Calcium 8.8 8.6 - 10.3 mg/dL      Assessment/Plan   11) Chronic Anemia w/ Gross Hematuria 2/2 Vaginal Cancer Tx:   Hemoglobin 8.3 this morning (8.3). She is s/p Amicar x 2 doses.     Plan: Repeat Hgb later today (see below)     2) Hematuria/UTI              Due to radiation cystitis and/or hemorrhagic cystitis              Rx: Continue Levaquin (d 5/7).              CBI discontinued. Urine today pinkish-red is much improved per RN    3) Tachycardia              Resolved after IVF, transfusions    4) DVT prophylaxis              SCDs, ambulation    5) Disposition:   Plan to repeat Hgb level this afternoon. Provided it is stable, will   Discontinue meza, proceed w/ voiding trial, and possible discharge late  Today or tomorrow. She will require Bariatric Tx as outpatient most likely.   If Hgb drops, she will need transfer.           I spent 35 minutes in the professional and overall care of this patient.      James Judd MD

## 2023-12-04 NOTE — NURSING NOTE
Pt. is currently awake in bed w/ no c/o pain or discomfort. Clear, pink urine output is noted in the Chowdhury drainage bag. No clots present. Continuous irrigation is on hold for now. Assessment as charted, VSS. All needs addressed at this time. Call light and personal belongings were placed within reach.

## 2023-12-04 NOTE — CARE PLAN
The patient's goals for the shift include pain free. The patient did remain pain free.    The clinical goals for the shift include improvement in color of urine    Over the shift, the patient did make progress toward the following goals. Barriers to progression include  discontinuation of bladder irrigation. . Recommendations to address these barriers increase po intake.  Problem: Fall/Injury  Goal: Pace activities to prevent fatigue by end of the shift  12/4/2023 1836 by Avril Ramirez RN  Outcome: Met  12/4/2023 0753 by Avril Ramirez RN  Outcome: Progressing     Problem: Pain - Adult  Goal: Verbalizes/displays adequate comfort level or baseline comfort level  12/4/2023 1836 by Avril Ramirez RN  Outcome: Met  12/4/2023 0754 by Avril Ramirez RN  Outcome: Progressing  12/4/2023 0754 by Avril Ramirez RN  Outcome: Progressing  12/4/2023 0753 by Avril Ramirez RN  Outcome: Progressing     Problem: Pain - Adult  Goal: Verbalizes/displays adequate comfort level or baseline comfort level  12/4/2023 0754 by Avril Ramirez RN  Outcome: Progressing

## 2023-12-04 NOTE — NURSING NOTE
Patient continues to remain without complaints.  She is eating dinner at this  time. Continue to monitor.

## 2023-12-05 VITALS
RESPIRATION RATE: 18 BRPM | HEART RATE: 92 BPM | WEIGHT: 166 LBS | OXYGEN SATURATION: 100 % | BODY MASS INDEX: 26.06 KG/M2 | DIASTOLIC BLOOD PRESSURE: 69 MMHG | HEIGHT: 67 IN | TEMPERATURE: 97.5 F | SYSTOLIC BLOOD PRESSURE: 126 MMHG

## 2023-12-05 PROBLEM — R00.2 PALPITATIONS: Status: RESOLVED | Noted: 2023-07-18 | Resolved: 2023-12-05

## 2023-12-05 LAB
BLOOD EXPIRATION DATE: NORMAL
DISPENSE STATUS: NORMAL
HCT VFR BLD AUTO: 28.9 % (ref 36–46)
HGB BLD-MCNC: 8.8 G/DL (ref 12–16)
PRODUCT BLOOD TYPE: 5100
PRODUCT CODE: NORMAL
UNIT ABO: NORMAL
UNIT NUMBER: NORMAL
UNIT RH: NORMAL
UNIT VOLUME: 350
XM INTEP: NORMAL

## 2023-12-05 PROCEDURE — 2500000004 HC RX 250 GENERAL PHARMACY W/ HCPCS (ALT 636 FOR OP/ED): Performed by: FAMILY MEDICINE

## 2023-12-05 PROCEDURE — 2500000001 HC RX 250 WO HCPCS SELF ADMINISTERED DRUGS (ALT 637 FOR MEDICARE OP): Performed by: PHYSICIAN ASSISTANT

## 2023-12-05 PROCEDURE — 36415 COLL VENOUS BLD VENIPUNCTURE: CPT | Performed by: PHYSICIAN ASSISTANT

## 2023-12-05 PROCEDURE — 2500000004 HC RX 250 GENERAL PHARMACY W/ HCPCS (ALT 636 FOR OP/ED): Performed by: PHYSICIAN ASSISTANT

## 2023-12-05 PROCEDURE — 85014 HEMATOCRIT: CPT | Performed by: PHYSICIAN ASSISTANT

## 2023-12-05 RX ORDER — ACETAMINOPHEN 325 MG/1
650 TABLET ORAL EVERY 6 HOURS PRN
Qty: 30 TABLET | Refills: 0
Start: 2023-12-05 | End: 2024-03-20 | Stop reason: ALTCHOICE

## 2023-12-05 RX ADMIN — ACYCLOVIR 400 MG: 200 CAPSULE ORAL at 09:21

## 2023-12-05 RX ADMIN — POLYETHYLENE GLYCOL 3350 17 G: 17 POWDER, FOR SOLUTION ORAL at 09:22

## 2023-12-05 RX ADMIN — IRON SUCROSE 100 MG: 20 INJECTION, SOLUTION INTRAVENOUS at 07:37

## 2023-12-05 RX ADMIN — LEVOFLOXACIN 500 MG: 500 TABLET, FILM COATED ORAL at 15:04

## 2023-12-05 RX ADMIN — FERROUS SULFATE TAB 325 MG (65 MG ELEMENTAL FE) 0.5 TABLET: 325 (65 FE) TAB at 07:37

## 2023-12-05 ASSESSMENT — PAIN SCALES - GENERAL
PAINLEVEL_OUTOF10: 0 - NO PAIN

## 2023-12-05 ASSESSMENT — COGNITIVE AND FUNCTIONAL STATUS - GENERAL
DAILY ACTIVITIY SCORE: 24
MOBILITY SCORE: 24

## 2023-12-05 ASSESSMENT — PAIN - FUNCTIONAL ASSESSMENT
PAIN_FUNCTIONAL_ASSESSMENT: 0-10

## 2023-12-05 NOTE — CARE PLAN
The patient's goals for the shift include pain free    The clinical goals for the shift include H&H most recent result not requiring a blood transfusion.WNL H&H    Over the shift, the patient did not meet  the following goals. Patient did remain pain free.   Patients' most recent H&H did not require a transfusion.

## 2023-12-05 NOTE — NURSING NOTE
Rounded on patient  Vitals stable.  Patient denies pain. Chowdhury secured and draining dark pink urine to bag by gravity.  Call light within reach.

## 2023-12-05 NOTE — DISCHARGE SUMMARY
Discharge Diagnosis  Severe anemia    Issues Requiring Follow-Up  1) CBC for anemia  2) Iron level in 2 weeks  3) [facility] Treatments   4) Follow up w/ Richard Carbajal MD Urology within 1 week     Test Results Pending At Discharge  Pending Labs       Order Current Status    Hemoglobin and Hematocrit Collected (11/28/23 9449)            Hospital Course   58-year-old female with a history of chronic anemia secondary to radiation cystitis after treatment for vaginal cancer.  She presented with severe anemia, and a hemoglobin of 3.8.  He was immediately transfused multiple times of PRBCs. (total of 4 units).   Urology was consulted and a three-way irrigation system was initiated.  The patient required antifibrinolytic's, including Amicar, which helped with the bleeding. She was also noted to have a UTI, treated w/ 7 days of Levaquin.     Bloody urine eventually improved, irrigation was discontinued, and the hemoglobin improved to 9.1.  She received oral iron during this period.  It was noted on day of discharge that her iron level was 20.  She was given IV iron sucrose 100 mg and oral iron was increased to 325 mg twice daily with food.     Discharge hemoglobin is 8.8.  The patient's urine remains bloody but much improved, and according to the patient, consistent with her baseline.  Arrangements were made by urology for the patient to follow-up at [facility] for ongoing treatment for the bleeding pathology      Pertinent Physical Exam At Time of Discharge  Alert & Oriented x 3. NAD  Head: ATNC  EYES: EOMI, PERRL. No scleral injection or discharge. No icterus  Throat: MMM w/o lesions  Neck: No DE, TM, Bruits  Cardiovascular: RRR w/o MRG. Peripheral Pulses 1-2+ and symmetrical  Respiratory: Clear w/o wheezes or rhonchi  Abdomen: Soft, NT +BS. No masses  Extremities: No LE edema, skin breakdown.   Neuro: No focal deficits      Home Medications     Medication List      CHANGE how you take these  medications     * acetaminophen 500 mg tablet; Commonly known as: Tylenol; What changed:   Another medication with the same name was added. Make sure you understand   how and when to take each.   * acetaminophen 325 mg tablet; Commonly known as: Tylenol; Take 2   tablets (650 mg) by mouth every 6 hours if needed for moderate pain (4 -   6).; What changed: You were already taking a medication with the same   name, and this prescription was added. Make sure you understand how and   when to take each.  * This list has 2 medication(s) that are the same as other medications   prescribed for you. Read the directions carefully, and ask your doctor or   other care provider to review them with you.     CONTINUE taking these medications     acyclovir 400 mg tablet; Commonly known as: Zovirax; Take 1 tablet (400   mg) by mouth 2 times a day.   alfuzosin 10 mg 24 hr tablet; Commonly known as: Uroxatral   baclofen 10 mg tablet; Commonly known as: Lioresal   Benefiber Clear SF (dextrin) 3 gram/3.5 gram powder in packet; Generic   drug: wheat dextrin   CALTRATE 600 ORAL   Caltrate with Vitamin D3 600 mg-20 mcg (800 unit) tablet; Generic drug:   calcium carbonate-vitamin D3   cephalexin 500 mg tablet; Commonly known as: Keftab   cholecalciferol 50 mcg (2,000 unit) capsule; Commonly known as: Vitamin   D-3   docusate sodium 100 mg capsule; Commonly known as: Colace   * ergocalciferol 1.25 MG (12689 UT) capsule; Commonly known as: Vitamin   D-2   * ergocalciferol 1.25 MG (78274 UT) capsule; Commonly known as: Vitamin   D-2   ertapenem 1 gram injection; Commonly known as: INVanz   ferrous gluconate 324 (38 Fe) mg tablet; Commonly known as: Fergon   lidocaine-prilocaine 4-2.5-2.5 % kit,cream and gel   Mobic 7.5 mg tablet; Generic drug: meloxicam   multivitamin with minerals tablet   naltrexone 4.5 mg capsule   nitrofurantoin (macrocrystal-monohydrate) 100 mg capsule; Commonly known   as: Macrobid   polyethylene glycol 17 gram/dose  powder; Commonly known as: Glycolax,   Miralax   POTASSIUM BICARBONATE ORAL   potassium chloride ER 8 mEq ER capsule; Commonly known as: Micro-K   Premarin vaginal cream; Generic drug: estrogens (conjugated)   Prolia 60 mg/mL syringe; Generic drug: denosumab; INJECT 60 MG/ ML SQ   EVERY 6 MONTHS--IF POSSIBLE TO BE DONE AT THE [facility]   TIZANIDINE ORAL   valACYclovir 500 mg tablet; Commonly known as: Valtrex  * This list has 2 medication(s) that are the same as other medications   prescribed for you. Read the directions carefully, and ask your doctor or   other care provider to review them with you.       Outpatient Follow-Up  Future Appointments   Date Time Provider Department Center   12/14/2023 11:20 AM DAYRON Lopez-CNP [id number] [facility]   12/15/2023  9:00 AM [facility] [id number] DEXA [id number] [facility]   12/18/2023  3:00 PM Maninder Yeager MD [id number] [facility]   1/9/2024 10:00 AM Kamar Treadwell MD [id number] [facility]   1/18/2024  9:20 AM Marcelino Tirado MD MPH [id number] [facility]   2/19/2024  2:40 PM Maninder Yeager MD [id number] [facility]   3/6/2024 12:00 PM Jorje Richardson MD [id number] [facility]       James Judd MD

## 2023-12-05 NOTE — NURSING NOTE
Patient remains without complaints. Patient did void again and urine was clear, no cherry color and no clots. Continue to monitor.

## 2023-12-05 NOTE — NURSING NOTE
Rounded on patient.  Vitals stable.  Patient sleeping; HOB elevated.  Chowdhury secure and draining dark pink urine to bag by gravity. Call light within reach.

## 2023-12-05 NOTE — NURSING NOTE
Patient up out of bed and is sitting on the couch in her room. She remains without complaints. Continue to monitor.

## 2023-12-05 NOTE — CARE PLAN
The patient's goals for the shift include pain free.    The clinical goals for the shift include improvement in color of urine and safety.       Problem: Chronic Conditions and Co-morbidities  Goal: Patient's chronic conditions and co-morbidity symptoms are monitored and maintained or improved  Outcome: Progressing

## 2023-12-05 NOTE — NURSING NOTE
Rounded on patient.  Patient is sleeping on left side; HOB elevated.  Chowdhury is secure and draining dark pink urine to bag by gravity. Call light within reach.

## 2023-12-05 NOTE — NURSING NOTE
Patient given her discharge papers. Reviewed discharge papers and follow up appointments with patient. Patient is waiting to be picked up by family.   Continue to monitor.

## 2023-12-05 NOTE — NURSING NOTE
Call placed to Dr. Crawford answering service regarding follow-up post discharge. Call received from Dr. Pennington. Hyperbaric treatment set-up still in process. Patient to follow-up with Dr. Crawford in office in 1 week. Primary nurse SKINNY Mackenzie and Care Coordinator notified.

## 2023-12-05 NOTE — NURSING NOTE
Patient did void moderate amount of cherry colored urine. Minimal small clots noted in urine in toilet.

## 2023-12-05 NOTE — NURSING NOTE
Assumed care of patient this am.  Patient sleeping at this time. Chowdhury remains to CD with dark cherry colored urine. Call light within reach, continue to monitor.

## 2023-12-05 NOTE — NURSING NOTE
Nurse to nurse report received from SKINNY Mackenzie.  Assumed care of patient.  Medications and orders reviewed. Patient laying in bed watching TV; HOB elevated.  Patient alert and oriented.  Lungs clear.  Patient denies pain or other needs at this time.  Chowdhury secure draining pink urine to bag by gravity. Call light within reach.

## 2023-12-05 NOTE — NURSING NOTE
Pt. Vital signs obtained.  Pt. Encouraged to order breakfast.  Call light within reach.  Bed alarm on.  Scd's on.

## 2023-12-05 NOTE — PROGRESS NOTES
Interdisciplinary Rounds were completed at the bedside with Patient.  Staff participating in rounds included: Clinical Nurse Orthopedic Coordinator Transitional Care Coordinator Director of Nursing/Assistant Nurse Manager Hospitalist MD/PA.  Topics discussed included: Today's Plan of Care Discharge Plan and Accommodations Physical Therapy Medications/Preferred Pharmacy and the Patient was given the opportunity to ask additional questions or bring up any concerns at that time.  During the final discharge discussion and review of instructions, they will have another opportunity to review questions or concerns prior to leaving our care.  Patient was given information on who to call post-discharge should new questions or concerns arise.      The patient's plan includes:    Discharge Date/Disposition:  Home, Today with, No Services Necessary  Discharge Needs: No Equipment Needs Identified  Medications/Pharmacy: Patient will  discharge prescriptions at primary pharmacy on file

## 2023-12-05 NOTE — CARE PLAN
RN TCC met with patient at the bedside during MDR to discuss care/discharge plan.  3 way meza with continuous irrigation discharged. H&H still low, will need to have labs rechecked and follow up with Urology within 1 week.   PCP follow up arranged for Dec. 18, 2023 at 3:00 pm-Marisela  Plan is home today with no skilled needs.

## 2023-12-05 NOTE — NURSING NOTE
Patient still sleeping but awakened her to administer the IV Iron, given without difficulty. Patient states no pain. Continue to monitor.

## 2023-12-05 NOTE — NURSING NOTE
Patient was discharged at 17:45. Patient taken down by nurse per wheelchair. All belongings were packed and taken with patient.

## 2023-12-06 ENCOUNTER — PATIENT OUTREACH (OUTPATIENT)
Dept: CARE COORDINATION | Facility: CLINIC | Age: 58
End: 2023-12-06
Payer: MEDICARE

## 2023-12-07 ENCOUNTER — LAB (OUTPATIENT)
Dept: LAB | Facility: LAB | Age: 58
End: 2023-12-07
Payer: MEDICARE

## 2023-12-07 DIAGNOSIS — D50.0 IRON DEFICIENCY ANEMIA DUE TO CHRONIC BLOOD LOSS: ICD-10-CM

## 2023-12-07 LAB
ERYTHROCYTE [DISTWIDTH] IN BLOOD BY AUTOMATED COUNT: 15.5 % (ref 11.5–14.5)
HCT VFR BLD AUTO: 33.8 % (ref 36–46)
HGB BLD-MCNC: 10 G/DL (ref 12–16)
MCH RBC QN AUTO: 26.8 PG (ref 26–34)
MCHC RBC AUTO-ENTMCNC: 29.6 G/DL (ref 32–36)
MCV RBC AUTO: 91 FL (ref 80–100)
NRBC BLD-RTO: ABNORMAL /100{WBCS}
PLATELET # BLD AUTO: 465 X10*3/UL (ref 150–450)
RBC # BLD AUTO: 3.73 X10*6/UL (ref 4–5.2)
WBC # BLD AUTO: 5.4 X10*3/UL (ref 4.4–11.3)

## 2023-12-07 PROCEDURE — 85027 COMPLETE CBC AUTOMATED: CPT

## 2023-12-07 PROCEDURE — 36415 COLL VENOUS BLD VENIPUNCTURE: CPT

## 2023-12-11 ENCOUNTER — TELEPHONE (OUTPATIENT)
Dept: PRIMARY CARE | Facility: CLINIC | Age: 58
End: 2023-12-11
Payer: MEDICARE

## 2023-12-11 NOTE — TELEPHONE ENCOUNTER
Rob called in on behalf of the PT and needs the PT wound therapy and radiation therapy notes from 2004      Faxed to [phone]

## 2023-12-14 ENCOUNTER — TELEPHONE (OUTPATIENT)
Dept: PRIMARY CARE | Facility: CLINIC | Age: 58
End: 2023-12-14

## 2023-12-14 ENCOUNTER — OFFICE VISIT (OUTPATIENT)
Dept: GYNECOLOGIC ONCOLOGY | Facility: CLINIC | Age: 58
End: 2023-12-14
Payer: MEDICARE

## 2023-12-14 VITALS
DIASTOLIC BLOOD PRESSURE: 78 MMHG | OXYGEN SATURATION: 100 % | SYSTOLIC BLOOD PRESSURE: 134 MMHG | RESPIRATION RATE: 18 BRPM | BODY MASS INDEX: 26.58 KG/M2 | TEMPERATURE: 97.3 F | WEIGHT: 169.75 LBS | HEART RATE: 78 BPM

## 2023-12-14 DIAGNOSIS — C52 MALIGNANT NEOPLASM OF VAGINA (MULTI): Primary | ICD-10-CM

## 2023-12-14 DIAGNOSIS — N39.8 VOIDING DYSFUNCTION: ICD-10-CM

## 2023-12-14 PROCEDURE — 87624 HPV HI-RISK TYP POOLED RSLT: CPT | Performed by: NURSE PRACTITIONER

## 2023-12-14 PROCEDURE — 88141 CYTOPATH C/V INTERPRET: CPT | Performed by: PATHOLOGY

## 2023-12-14 PROCEDURE — 3075F SYST BP GE 130 - 139MM HG: CPT | Performed by: NURSE PRACTITIONER

## 2023-12-14 PROCEDURE — 99215 OFFICE O/P EST HI 40 MIN: CPT | Performed by: NURSE PRACTITIONER

## 2023-12-14 PROCEDURE — 3078F DIAST BP <80 MM HG: CPT | Performed by: NURSE PRACTITIONER

## 2023-12-14 PROCEDURE — 88142 CYTOPATH C/V THIN LAYER: CPT | Mod: TC | Performed by: NURSE PRACTITIONER

## 2023-12-14 PROCEDURE — 1036F TOBACCO NON-USER: CPT | Performed by: NURSE PRACTITIONER

## 2023-12-14 ASSESSMENT — PATIENT HEALTH QUESTIONNAIRE - PHQ9
2. FEELING DOWN, DEPRESSED OR HOPELESS: NOT AT ALL
1. LITTLE INTEREST OR PLEASURE IN DOING THINGS: NOT AT ALL
SUM OF ALL RESPONSES TO PHQ9 QUESTIONS 1 AND 2: 0

## 2023-12-14 ASSESSMENT — PAIN SCALES - GENERAL: PAINLEVEL: 0-NO PAIN

## 2023-12-14 ASSESSMENT — ENCOUNTER SYMPTOMS
DEPRESSION: 0
OCCASIONAL FEELINGS OF UNSTEADINESS: 0
LOSS OF SENSATION IN FEET: 0

## 2023-12-14 ASSESSMENT — COLUMBIA-SUICIDE SEVERITY RATING SCALE - C-SSRS
6. HAVE YOU EVER DONE ANYTHING, STARTED TO DO ANYTHING, OR PREPARED TO DO ANYTHING TO END YOUR LIFE?: NO
1. IN THE PAST MONTH, HAVE YOU WISHED YOU WERE DEAD OR WISHED YOU COULD GO TO SLEEP AND NOT WAKE UP?: NO
2. HAVE YOU ACTUALLY HAD ANY THOUGHTS OF KILLING YOURSELF?: NO

## 2023-12-14 NOTE — PROGRESS NOTES
Patient ID: Laurence Campbell is a 58 y.o. female.    Subjective    HPI    50 yo with history of Stage III Vaginal Cancer presents for a cancer surveillance visit. She was diagnosed in 2004 and treated with chemoradiation. She has been disease free since then. She developed radiation proctitis, radiation cystitis, and LLE Lymphedema after her treatments. She uses a cane to assist with ambulation due to her lymphedema.     Radiation History: Pelvic external beam radiation with concurrent Cisplatin chemotherapy from 4/9/04 - 5/14/04 followed by low dose brachytherapy given in two applications on 5/25/04 and 6/3/04    Hospitalized 7/5/23 - 7/11/23 with hydronephrosis and hematuria consistent with radiation cystitis     Hospitalized 11/27/23 - 12/5/23 with severe anemia (Hgb 3.8) due to radiation cystitis, bladder irrigation was initiated and was treated for UTI. Transfused with multiple units of RBCs. Arrangements were made by urology for the patient to follow-up at [facility] for ongoing treatment for the bleeding pathology.    Interval History: Here for pap. Occasional constipation, taking colace as needed. Patient recently discharged from the hospital following severe anemia related to radiation cystitis. She is no longer having hematuria and no longer needs to self cath. Patient not been sexually active. Patient has baseline LLE lymphedema. Last pap 12/2022 negative, HPV -. Lost a significant amount of weight while hospitalized over the last 6 months. Has not had any vaginal bleeding. Denies any other new medical problems since last visit.     Objective    BSA: 1.91 meters squared  /78   Pulse 78   Temp 36.3 °C (97.3 °F)   Resp 18   Wt 77 kg (169 lb 12.1 oz)   SpO2 100%   BMI 26.58 kg/m²      Physical Exam  Vitals and nursing note reviewed.   Constitutional:       Appearance: Normal appearance. She is normal weight.   HENT:      Mouth/Throat:      Mouth: Mucous membranes are moist.       Pharynx: Oropharynx is clear.   Eyes:      Conjunctiva/sclera: Conjunctivae normal.      Pupils: Pupils are equal, round, and reactive to light.   Cardiovascular:      Rate and Rhythm: Normal rate and regular rhythm.   Pulmonary:      Effort: Pulmonary effort is normal.      Breath sounds: Normal breath sounds.   Abdominal:      General: Abdomen is flat. There is no distension.      Palpations: Abdomen is soft. There is no mass.      Tenderness: There is no abdominal tenderness.   Genitourinary:     General: Normal vulva.      Vagina: Normal.      Uterus: Absent.       Comments: Vaginal vault stenotic and shortened,  about 2 inches, radiation changes to tissue, pediatric speculum used to examine patient, walls bleed easily with exam, no lesions noted, smooth vaginal walls.  Musculoskeletal:         General: Normal range of motion.   Lymphadenopathy:      Comments: Chronic stable lymphedema   Skin:     General: Skin is warm and dry.   Neurological:      Mental Status: She is alert.   Psychiatric:         Mood and Affect: Mood normal.         Behavior: Behavior normal.         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 JOSUE 19 years.    Plan:    Pap today, f/u results - last several have been inconclusive, HPV -     If negative, f/u in 1 year or as needed     Continue follow up with urology for radiation cystitis, will be starting hyperbaric oxygen treatments soon       Rachel Virgen, APRN-CNP

## 2023-12-15 ENCOUNTER — ANCILLARY PROCEDURE (OUTPATIENT)
Dept: RADIOLOGY | Facility: CLINIC | Age: 58
End: 2023-12-15
Payer: MEDICARE

## 2023-12-15 DIAGNOSIS — M81.8 OTHER OSTEOPOROSIS WITHOUT CURRENT PATHOLOGICAL FRACTURE: ICD-10-CM

## 2023-12-15 DIAGNOSIS — E21.0 PRIMARY HYPERPARATHYROIDISM (MULTI): ICD-10-CM

## 2023-12-15 DIAGNOSIS — E34.9 ENDOCRINE DISORDER, UNSPECIFIED: ICD-10-CM

## 2023-12-15 PROCEDURE — 77080 DXA BONE DENSITY AXIAL: CPT

## 2023-12-15 PROCEDURE — 77080 DXA BONE DENSITY AXIAL: CPT | Performed by: RADIOLOGY

## 2023-12-18 ENCOUNTER — OFFICE VISIT (OUTPATIENT)
Dept: PRIMARY CARE | Facility: CLINIC | Age: 58
End: 2023-12-18
Payer: MEDICARE

## 2023-12-18 VITALS
RESPIRATION RATE: 22 BRPM | BODY MASS INDEX: 26.02 KG/M2 | HEART RATE: 92 BPM | DIASTOLIC BLOOD PRESSURE: 80 MMHG | SYSTOLIC BLOOD PRESSURE: 120 MMHG | OXYGEN SATURATION: 100 % | WEIGHT: 166.2 LBS

## 2023-12-18 DIAGNOSIS — E83.42 HYPOMAGNESEMIA: ICD-10-CM

## 2023-12-18 DIAGNOSIS — Z00.00 ROUTINE GENERAL MEDICAL EXAMINATION AT A HEALTH CARE FACILITY: Primary | ICD-10-CM

## 2023-12-18 DIAGNOSIS — E55.9 AVITAMINOSIS D: ICD-10-CM

## 2023-12-18 PROCEDURE — G0439 PPPS, SUBSEQ VISIT: HCPCS | Performed by: INTERNAL MEDICINE

## 2023-12-18 PROCEDURE — 1036F TOBACCO NON-USER: CPT | Performed by: INTERNAL MEDICINE

## 2023-12-18 PROCEDURE — 3074F SYST BP LT 130 MM HG: CPT | Performed by: INTERNAL MEDICINE

## 2023-12-18 PROCEDURE — 3079F DIAST BP 80-89 MM HG: CPT | Performed by: INTERNAL MEDICINE

## 2023-12-18 RX ORDER — IBUPROFEN 200 MG
CAPSULE ORAL
COMMUNITY
End: 2024-03-20 | Stop reason: ALTCHOICE

## 2023-12-18 ASSESSMENT — ACTIVITIES OF DAILY LIVING (ADL)
DOING_HOUSEWORK: INDEPENDENT
MANAGING_FINANCES: INDEPENDENT
GROCERY_SHOPPING: INDEPENDENT
DRESSING: INDEPENDENT
BATHING: INDEPENDENT
TAKING_MEDICATION: INDEPENDENT

## 2023-12-18 ASSESSMENT — PATIENT HEALTH QUESTIONNAIRE - PHQ9
SUM OF ALL RESPONSES TO PHQ9 QUESTIONS 1 AND 2: 0
2. FEELING DOWN, DEPRESSED OR HOPELESS: NOT AT ALL
1. LITTLE INTEREST OR PLEASURE IN DOING THINGS: NOT AT ALL

## 2023-12-18 NOTE — PROGRESS NOTES
Subjective   Patient ID: Laurence Campbell is a 58 y.o. female who presents for Hospital Follow-up and Medicare Annual Wellness Visit Subsequent.    58 F    AWV    Admitted with UTI with hematuria- received 6 units Prbc     Urologist-Dr Carbajal    AWV    HPI         Review of Systems      No Fever/chills/headaches/dizziness/chest pains/ cough/ shortness of breath/palpitations/ abdominal pain /Nausea/vomiting/diarrhea/ constipation/urine frequency/blood in urine.      Objective   Pulse 92   Resp 22   Wt 75.4 kg (166 lb 3.2 oz)   SpO2 100%   BMI 26.02 kg/m²     Physical Exam      No JVP elevation. No palpable Lymph Nodes. No Thyromegaly    HEENT- Negative    CVS-NL S1/S2 . No MRG    Lungs-CTA. B/S= B/L    Abdomen-Soft, Non-tender. No masses or HSM    Extremities: No C/C    LLE edema    Skin-No abnormal moles/rash        Assessment/Plan        H/o Vaginal CA- Radiation Cystitis    Recurrent UTI- Self Cath-Follow up with Dr Carbajal    Labs    Follow up/ Call with any concerns    Follow up 3 months/PRN

## 2023-12-19 ENCOUNTER — APPOINTMENT (OUTPATIENT)
Dept: PRIMARY CARE | Facility: CLINIC | Age: 58
End: 2023-12-19
Payer: MEDICARE

## 2023-12-19 ENCOUNTER — PATIENT OUTREACH (OUTPATIENT)
Dept: CARE COORDINATION | Facility: CLINIC | Age: 58
End: 2023-12-19

## 2023-12-19 ENCOUNTER — LAB (OUTPATIENT)
Dept: LAB | Facility: LAB | Age: 58
End: 2023-12-19
Payer: MEDICARE

## 2023-12-19 DIAGNOSIS — D64.9 ANEMIA, UNSPECIFIED TYPE: ICD-10-CM

## 2023-12-19 DIAGNOSIS — E55.9 AVITAMINOSIS D: ICD-10-CM

## 2023-12-19 DIAGNOSIS — Z00.00 ROUTINE GENERAL MEDICAL EXAMINATION AT A HEALTH CARE FACILITY: ICD-10-CM

## 2023-12-19 DIAGNOSIS — E83.42 HYPOMAGNESEMIA: ICD-10-CM

## 2023-12-19 LAB
25(OH)D3 SERPL-MCNC: 34 NG/ML (ref 30–100)
ALBUMIN SERPL BCP-MCNC: 3.4 G/DL (ref 3.4–5)
ALP SERPL-CCNC: 56 U/L (ref 33–110)
ALT SERPL W P-5'-P-CCNC: 6 U/L (ref 7–45)
ANION GAP SERPL CALC-SCNC: 15 MMOL/L (ref 10–20)
APPEARANCE UR: ABNORMAL
AST SERPL W P-5'-P-CCNC: 12 U/L (ref 9–39)
BACTERIA #/AREA URNS AUTO: ABNORMAL /HPF
BASOPHILS # BLD AUTO: 0.06 X10*3/UL (ref 0–0.1)
BASOPHILS NFR BLD AUTO: 1 %
BILIRUB SERPL-MCNC: 0.4 MG/DL (ref 0–1.2)
BILIRUB UR STRIP.AUTO-MCNC: NEGATIVE MG/DL
BUN SERPL-MCNC: 12 MG/DL (ref 6–23)
CALCIUM SERPL-MCNC: 9.1 MG/DL (ref 8.6–10.3)
CHLORIDE SERPL-SCNC: 109 MMOL/L (ref 98–107)
CHOLEST SERPL-MCNC: 171 MG/DL (ref 0–199)
CHOLESTEROL/HDL RATIO: 4.3
CO2 SERPL-SCNC: 21 MMOL/L (ref 21–32)
COLOR UR: YELLOW
CREAT SERPL-MCNC: 0.9 MG/DL (ref 0.5–1.05)
EOSINOPHIL # BLD AUTO: 0.21 X10*3/UL (ref 0–0.7)
EOSINOPHIL NFR BLD AUTO: 3.7 %
ERYTHROCYTE [DISTWIDTH] IN BLOOD BY AUTOMATED COUNT: 14.2 % (ref 11.5–14.5)
GFR SERPL CREATININE-BSD FRML MDRD: 75 ML/MIN/1.73M*2
GLUCOSE SERPL-MCNC: 89 MG/DL (ref 74–99)
GLUCOSE UR STRIP.AUTO-MCNC: NEGATIVE MG/DL
HCT VFR BLD AUTO: 33.4 % (ref 36–46)
HDLC SERPL-MCNC: 40 MG/DL
HGB BLD-MCNC: 10.3 G/DL (ref 12–16)
IMM GRANULOCYTES # BLD AUTO: 0.01 X10*3/UL (ref 0–0.7)
IMM GRANULOCYTES NFR BLD AUTO: 0.2 % (ref 0–0.9)
IRON SATN MFR SERPL: 4 % (ref 25–45)
IRON SERPL-MCNC: 13 UG/DL (ref 35–150)
KETONES UR STRIP.AUTO-MCNC: NEGATIVE MG/DL
LDLC SERPL CALC-MCNC: 116 MG/DL
LEUKOCYTE ESTERASE UR QL STRIP.AUTO: ABNORMAL
LYMPHOCYTES # BLD AUTO: 1.71 X10*3/UL (ref 1.2–4.8)
LYMPHOCYTES NFR BLD AUTO: 29.9 %
MCH RBC QN AUTO: 26.4 PG (ref 26–34)
MCHC RBC AUTO-ENTMCNC: 30.8 G/DL (ref 32–36)
MCV RBC AUTO: 86 FL (ref 80–100)
MONOCYTES # BLD AUTO: 0.61 X10*3/UL (ref 0.1–1)
MONOCYTES NFR BLD AUTO: 10.7 %
NEUTROPHILS # BLD AUTO: 3.12 X10*3/UL (ref 1.2–7.7)
NEUTROPHILS NFR BLD AUTO: 54.5 %
NITRITE UR QL STRIP.AUTO: POSITIVE
NON HDL CHOLESTEROL: 131 MG/DL (ref 0–149)
NRBC BLD-RTO: 0 /100 WBCS (ref 0–0)
PH UR STRIP.AUTO: 6 [PH]
PLATELET # BLD AUTO: 412 X10*3/UL (ref 150–450)
POTASSIUM SERPL-SCNC: 4.2 MMOL/L (ref 3.5–5.3)
PROT SERPL-MCNC: 6.3 G/DL (ref 6.4–8.2)
PROT UR STRIP.AUTO-MCNC: ABNORMAL MG/DL
RBC # BLD AUTO: 3.9 X10*6/UL (ref 4–5.2)
RBC # UR STRIP.AUTO: ABNORMAL /UL
RBC #/AREA URNS AUTO: >20 /HPF
SODIUM SERPL-SCNC: 141 MMOL/L (ref 136–145)
SP GR UR STRIP.AUTO: 1.01
TIBC SERPL-MCNC: 322 UG/DL (ref 240–445)
TRIGL SERPL-MCNC: 74 MG/DL (ref 0–149)
TSH SERPL-ACNC: 0.99 MIU/L (ref 0.44–3.98)
UIBC SERPL-MCNC: 309 UG/DL (ref 110–370)
UROBILINOGEN UR STRIP.AUTO-MCNC: <2 MG/DL
VLDL: 15 MG/DL (ref 0–40)
WBC # BLD AUTO: 5.7 X10*3/UL (ref 4.4–11.3)
WBC #/AREA URNS AUTO: >50 /HPF
WBC CLUMPS #/AREA URNS AUTO: ABNORMAL /HPF

## 2023-12-19 PROCEDURE — 83550 IRON BINDING TEST: CPT

## 2023-12-19 PROCEDURE — 83540 ASSAY OF IRON: CPT

## 2023-12-19 PROCEDURE — 85025 COMPLETE CBC W/AUTO DIFF WBC: CPT

## 2023-12-19 PROCEDURE — 84443 ASSAY THYROID STIM HORMONE: CPT

## 2023-12-19 PROCEDURE — 36415 COLL VENOUS BLD VENIPUNCTURE: CPT

## 2023-12-19 PROCEDURE — 80053 COMPREHEN METABOLIC PANEL: CPT

## 2023-12-19 PROCEDURE — 82306 VITAMIN D 25 HYDROXY: CPT

## 2023-12-19 PROCEDURE — 80061 LIPID PANEL: CPT

## 2023-12-19 PROCEDURE — 81001 URINALYSIS AUTO W/SCOPE: CPT

## 2023-12-19 NOTE — PROGRESS NOTES
Call regarding appt. with PCP on 12/18/23 after hospitalization.  At time of outreach call the patient feels as if their condition has (improved  since last visit.  Reviewed the PCP appointment with the pt and addressed any questions or concerns.

## 2024-01-03 ENCOUNTER — PATIENT OUTREACH (OUTPATIENT)
Dept: CARE COORDINATION | Facility: CLINIC | Age: 59
End: 2024-01-03
Payer: MEDICARE

## 2024-01-03 NOTE — PROGRESS NOTES
Call placed regarding one month post discharge follow up call.  At time of outreach call the patient feels as if their condition has (improved  since initial visit with PCP or specialist.  Questions or concerns regarding recovery period addressed at this time. (Individualize as needed if questions arise)  Reviewed any PCP or specialists progress notes/labs/radiology reports if applicable and addressed any questions or concerns.

## 2024-01-04 LAB
CYTOLOGY CMNT CVX/VAG CYTO-IMP: NORMAL
HPV HR 12 DNA GENITAL QL NAA+PROBE: NEGATIVE
HPV HR GENOTYPES PNL CVX NAA+PROBE: NEGATIVE
HPV16 DNA SPEC QL NAA+PROBE: NEGATIVE
HPV18 DNA SPEC QL NAA+PROBE: NEGATIVE
LAB AP HPV GENOTYPE QUESTION: YES
LAB AP HPV HR: NORMAL
LAB AP TREATMENT HISTORY: NORMAL
LABORATORY COMMENT REPORT: NORMAL
LABORATORY COMMENT REPORT: NORMAL
MENSTRUAL HX REPORTED: NORMAL
PATH REPORT.RELEVANT HX SPEC: NORMAL
PATH REPORT.TOTAL CANCER: NORMAL

## 2024-01-09 ENCOUNTER — OFFICE VISIT (OUTPATIENT)
Dept: ENDOCRINOLOGY | Facility: CLINIC | Age: 59
End: 2024-01-09
Payer: MEDICARE

## 2024-01-09 VITALS
HEART RATE: 70 BPM | DIASTOLIC BLOOD PRESSURE: 89 MMHG | TEMPERATURE: 96.9 F | BODY MASS INDEX: 26.01 KG/M2 | WEIGHT: 165.7 LBS | RESPIRATION RATE: 16 BRPM | HEIGHT: 67 IN | SYSTOLIC BLOOD PRESSURE: 133 MMHG

## 2024-01-09 DIAGNOSIS — M85.80 OSTEOPENIA, UNSPECIFIED LOCATION: ICD-10-CM

## 2024-01-09 DIAGNOSIS — E55.9 AVITAMINOSIS D: Primary | ICD-10-CM

## 2024-01-09 DIAGNOSIS — M81.8 OTHER OSTEOPOROSIS, UNSPECIFIED PATHOLOGICAL FRACTURE PRESENCE: ICD-10-CM

## 2024-01-09 PROCEDURE — 1036F TOBACCO NON-USER: CPT | Performed by: INTERNAL MEDICINE

## 2024-01-09 PROCEDURE — 99215 OFFICE O/P EST HI 40 MIN: CPT | Performed by: INTERNAL MEDICINE

## 2024-01-09 PROCEDURE — 3079F DIAST BP 80-89 MM HG: CPT | Performed by: INTERNAL MEDICINE

## 2024-01-09 PROCEDURE — 3075F SYST BP GE 130 - 139MM HG: CPT | Performed by: INTERNAL MEDICINE

## 2024-01-09 ASSESSMENT — PAIN SCALES - GENERAL: PAINLEVEL: 0-NO PAIN

## 2024-01-09 NOTE — PROGRESS NOTES
Consults    Reason For Consult  Osteoporosis     History Of Present Illness  Laurence Campbell is a 58 y.o. female presenting with osteoporosis .    Since last visit many episode sof UTI  She skipped July prolia due to her challenges   She self cath, she had UTI infections, and clots  She was inpt , required 6 units       Last prolia 7/2023 she did not get yet      osteoporosis 2/2 primary hyperparathyroidism (s/p parathyroidectomy). She had an allergic reaction to alendronate and has subsequently been on Prolia injections since February 2020.      Continues to take her 4000 units Vitamin D daily and Caltrate BID. No recent falls or fractures. Denies any cramps or muscle weakness. Due for her next infusion in July 20, 2022.  Last bone density reviewed    FINDINGS:  SPINE L1-L4  Bone Mineral Density: 0.744  T-Score -3.5  Z-Score -3.6      Bone Mineral Density change vs baseline:  Not reported  Bone Mineral Density change vs previous: -9.0%      LEFT FEMUR -TOTAL  Bone Mineral Density: 0.774  T-Score -1.9   Z-Score  -2.3      Bone Mineral Density change vs baseline: Not reported  Bone Mineral Density change vs previous: -2.1%      LEFT FEMUR -NECK  Bone Mineral Density: 0.897  T-Score -1.0  Z-Score -1.0          RIGHT FOREARM  Bone Mineral Density: 0.787  T-Score 1.7   Z-Score 1.6  UD  Bone Mineral Density: 0.468  T-Score 0.0  Z-Score -0.1  MID  Bone Mineral Density: Not reported  T-Score Not reported  Z-Score  Not reported  1/3  Bone Mineral Density: 1.049  T-Score 1.8  Z-Score 1.8  Bone Mineral Density change vs baseline:  Not reported  Bone Mineral Density change vs previous:  -0.1%            Any family history of thyroid cancer? no  Any personal history of radiation to your head/neck? no    Past Medical History  She has a past medical history of Abdominal pain, LLQ (left lower quadrant) (07/18/2023), Acute UTI (07/18/2023), Cellulitis (07/18/2023), Constipation (07/18/2023), Encounter for other preprocedural  examination, Left wrist pain (07/18/2023), Right shoulder pain (07/18/2023), Skin irritation (07/18/2023), and Stiffness of right shoulder joint (07/18/2023).    Surgical History  She has a past surgical history that includes Vaginal mass excision (03/09/2015); Other surgical history (11/01/2017); and US guided biopsy lymph node superficial (12/3/2018).     Social History  She reports that she has never smoked. She has never used smokeless tobacco. She reports that she does not drink alcohol and does not use drugs.    Family History  Family History   Problem Relation Name Age of Onset    Endometrial cancer Mother      Hypertension Mother      Obesity Mother      Lung cancer Father      Leukemia Brother      Other (Endometrial carcinoma) Mother          Allergies  Amoxicillin, Alendronate sodium, Alendronic acid, Amoxicillin, Clindamycin, Penicillin, Piperacillin-tazobactam, Sulfa (sulfonamide antibiotics), Sulfa (sulfonamide antibiotics), and Clindamycin    Review of Systems    Past Medical History:   Diagnosis Date    Abdominal pain, LLQ (left lower quadrant) 07/18/2023    Acute UTI 07/18/2023    Cellulitis 07/18/2023    Constipation 07/18/2023    Encounter for other preprocedural examination     Preop testing    Left wrist pain 07/18/2023    Right shoulder pain 07/18/2023    Skin irritation 07/18/2023    Stiffness of right shoulder joint 07/18/2023       Past Surgical History:   Procedure Laterality Date    OTHER SURGICAL HISTORY  11/01/2017    Nephrectomy Right    US GUIDED BIOPSY LYMPH NODE SUPERFICIAL  12/3/2018    US GUIDED BIOPSY LYMPH NODE SUPERFICIAL 12/3/2018 [facility]    VAGINAL MASS EXCISION  03/09/2015    Excision Of Vaginal Cyst Or Tumor       Social History     Socioeconomic History    Marital status: Single     Spouse name: Not on file    Number of children: Not on file    Years of education: Not on file    Highest education level: Not on file   Occupational History    Not on file   Tobacco Use     Smoking status: Never    Smokeless tobacco: Never   Vaping Use    Vaping Use: Never used   Substance and Sexual Activity    Alcohol use: Never    Drug use: Never    Sexual activity: Not Currently   Other Topics Concern    Not on file   Social History Narrative    ** Merged History Encounter **          Social Determinants of Health     Financial Resource Strain: Low Risk  (12/2/2023)    Overall Financial Resource Strain (CARDIA)     Difficulty of Paying Living Expenses: Not hard at all   Food Insecurity: No Food Insecurity (11/27/2023)    Hunger Vital Sign     Worried About Running Out of Food in the Last Year: Never true     Ran Out of Food in the Last Year: Never true   Transportation Needs: No Transportation Needs (12/2/2023)    PRAPARE - Transportation     Lack of Transportation (Medical): No     Lack of Transportation (Non-Medical): No   Physical Activity: Inactive (11/27/2023)    Exercise Vital Sign     Days of Exercise per Week: 0 days     Minutes of Exercise per Session: 0 min   Stress: No Stress Concern Present (11/27/2023)    [employer] - Occupational Stress Questionnaire     Feeling of Stress : Not at all   Social Connections: Moderately Isolated (11/27/2023)    Social Connection and Isolation Panel [NHANES]     Frequency of Communication with Friends and Family: More than three times a week     Frequency of Social Gatherings with Friends and Family: More than three times a week     Attends Congregation Services: Never     Active Member of Clubs or Organizations: Yes     Attends Club or Organization Meetings: Never     Marital Status: Never    Intimate Partner Violence: Not At Risk (11/27/2023)    Humiliation, Afraid, Rape, and Kick questionnaire     Fear of Current or Ex-Partner: No     Emotionally Abused: No     Physically Abused: No     Sexually Abused: No   Housing Stability: Low Risk  (12/2/2023)    Housing Stability Vital Sign     Unable to Pay for Housing in the  "Last Year: No     Number of Places Lived in the Last Year: 1     Unstable Housing in the Last Year: No        Physical Exam     ROS, PMH, FH/SH, surgical history and allergies have been reviewed.    Last Recorded Vitals  Blood pressure 133/89, pulse 70, temperature 36.1 °C (96.9 °F), resp. rate 16, height 1.702 m (5' 7\"), weight 75.2 kg (165 lb 11.2 oz).    Relevant Results         If you would like to pull in Medications, type .meds     If you would like to pull in Lab results for the last 24 hours, type .[id number]    If you would like to pull in specific Lab results, type .ll     If you would like to pull in Imaging results, type .imgrslt :99}  Lab Review  Lab Results   Component Value Date    BILITOT 0.4 12/19/2023    CALCIUM 9.1 12/19/2023    CO2 21 12/19/2023     (H) 12/19/2023    CREATININE 0.90 12/19/2023    GLUCOSE 89 12/19/2023    ALKPHOS 56 12/19/2023    K 4.2 12/19/2023    PROT 6.3 (L) 12/19/2023     12/19/2023    AST 12 12/19/2023    ALT 6 (L) 12/19/2023    BUN 12 12/19/2023    ANIONGAP 15 12/19/2023    MG 1.7 09/20/2023    PHOS 2.6 08/26/2023    ALBUMIN 3.4 12/19/2023    LIPASE 56 04/03/2019    GFRF 64 09/15/2023    GFRMALE CANCELED 08/08/2023     Lab Results   Component Value Date    TRIG 74 12/19/2023    CHOL 171 12/19/2023    LDLCALC 116 (H) 12/19/2023    HDL 40.0 12/19/2023     No results found for: \"HGBA1C\"  The 10-year ASCVD risk score (Ayush TERRY, et al., 2019) is: 5%    Values used to calculate the score:      Age: 58 years      Sex: Female      Is Non- : Yes      Diabetic: No      Tobacco smoker: No      Systolic Blood Pressure: 133 mmHg      Is BP treated: No      HDL Cholesterol: 40 mg/dL      Total Cholesterol: 171 mg/dL       Assessment/Plan   Problem List Items Addressed This Visit             ICD-10-CM    Avitaminosis D - Primary E55.9    Relevant Medications    denosumab (Prolia) 60 mg/mL syringe    Other Relevant Orders    Vitamin D 25-Hydroxy,Total " (for eval of Vitamin D levels)    Renal Function Panel    Osteopenia M85.80    Relevant Medications    denosumab (Prolia) 60 mg/mL syringe    Other Relevant Orders    Vitamin D 25-Hydroxy,Total (for eval of Vitamin D levels)    Renal Function Panel     Other Visit Diagnoses         Codes    Other osteoporosis, unspecified pathological fracture presence     M81.8                 · Osteoporosis in endocrine disorders (733.09) (M81.8,E34.9)   · Primary hyperparathyroidism       #Osteoporosis  - Reviewed recent DEXA (12/2023)    Osteoporosis noted to be most prominent in lumbar spine. Repeat in 2025,   - Scheduled for ski[ppped her last  Prolia injection on 07/2023 at infusion center. Due to multiple episodes of UTI from self cathing   Started injections 02/2020.   - Repeat Ca, Vitamin D before next infusion  - Continue Vitamin D 4000 IU daily, Caltrate daily  - RTC in 12 months     I spent a total of 40+ minutes on the date of the service which included preparing to see the patient, face-to-face patient care, completing clinical documentation, obtaining and / or reviewing separately obtained history, counseling and educating the patient/family/caregiver, ordering medications, tests, or procedures, communicating with other healthcare providers (not separately reported), independently interpreting results, not separately reported, and communicating results to the patient/family/caregiver, real-time  with patient's verbal consent.  Name and date of birth verified.      Kamar Treadwell MD

## 2024-01-11 ENCOUNTER — TELEPHONE (OUTPATIENT)
Dept: GYNECOLOGIC ONCOLOGY | Facility: HOSPITAL | Age: 59
End: 2024-01-11
Payer: MEDICARE

## 2024-01-11 NOTE — TELEPHONE ENCOUNTER
Phoned patient to notify her that pap showed atypical cells/-HPV, stable result and exam per Rachel Virgen CNP and recommended follow up is 1 year.   Messaged Sydnee Bustamante requesting she contact patient to schedule 1 year follow up appointment.

## 2024-01-18 ENCOUNTER — OFFICE VISIT (OUTPATIENT)
Dept: INFECTIOUS DISEASES | Facility: CLINIC | Age: 59
End: 2024-01-18
Payer: MEDICARE

## 2024-01-18 VITALS
WEIGHT: 170.8 LBS | HEIGHT: 67 IN | HEART RATE: 97 BPM | SYSTOLIC BLOOD PRESSURE: 129 MMHG | BODY MASS INDEX: 26.81 KG/M2 | TEMPERATURE: 98.2 F | DIASTOLIC BLOOD PRESSURE: 81 MMHG

## 2024-01-18 DIAGNOSIS — C52 MALIGNANT NEOPLASM OF VAGINA (MULTI): ICD-10-CM

## 2024-01-18 DIAGNOSIS — N31.9 NEUROGENIC BLADDER: ICD-10-CM

## 2024-01-18 DIAGNOSIS — N39.0 RECURRENT UTI: Primary | ICD-10-CM

## 2024-01-18 DIAGNOSIS — R33.9 URINARY RETENTION WITH INCOMPLETE BLADDER EMPTYING: ICD-10-CM

## 2024-01-18 PROCEDURE — 3074F SYST BP LT 130 MM HG: CPT | Performed by: INTERNAL MEDICINE

## 2024-01-18 PROCEDURE — 99214 OFFICE O/P EST MOD 30 MIN: CPT | Performed by: INTERNAL MEDICINE

## 2024-01-18 PROCEDURE — 1036F TOBACCO NON-USER: CPT | Performed by: INTERNAL MEDICINE

## 2024-01-18 PROCEDURE — 3079F DIAST BP 80-89 MM HG: CPT | Performed by: INTERNAL MEDICINE

## 2024-01-18 RX ORDER — FLUTICASONE PROPIONATE 50 MCG
1 SPRAY, SUSPENSION (ML) NASAL DAILY
COMMUNITY
Start: 2024-01-12

## 2024-01-18 ASSESSMENT — PAIN SCALES - GENERAL: PAINLEVEL: 0-NO PAIN

## 2024-01-18 ASSESSMENT — ENCOUNTER SYMPTOMS
ALLERGIC/IMMUNOLOGIC NEGATIVE: 1
RESPIRATORY NEGATIVE: 1
ENDOCRINE NEGATIVE: 1
GASTROINTESTINAL NEGATIVE: 1
HEMATOLOGIC/LYMPHATIC NEGATIVE: 1
CONSTITUTIONAL NEGATIVE: 1
NEUROLOGICAL NEGATIVE: 1
PSYCHIATRIC NEGATIVE: 1
MUSCULOSKELETAL NEGATIVE: 1
EYES NEGATIVE: 1
CARDIOVASCULAR NEGATIVE: 1

## 2024-01-18 NOTE — LETTER
01/18/24    Maninder Yeager MD  [address] [place], [facility] [address]  [facility] [address]      Dear Dr. Maninder Yeager MD,    I am writing to confirm that your patient, Laurence Campbell, received care in my office on 01/18/24. I have enclosed a summary of the care provided to Laurence for your reference.    Please contact me with any questions you may have regarding the visit.    Sincerely,         Marcelino Tirado MD MPH  [id number] [address]  [facility] [address]    CC: No Recipients

## 2024-01-18 NOTE — PROGRESS NOTES
[facility] Consult Note:    Referred by Self  Primary MD: Maninder Yeager MD  Reason for Consult: Recurrent UTIs    History of Current Issue  Laurence Campbell is a 58 y.o. year old female with a PMHx of HTN, chronic CYNTHIA, Stage 3 vaginal cancer s/p chemoradiation (2004). This was complicated by radiation cystitis, radiation proctitis, ureter obstruction requiring stents, right nephrectomy. Also developed neurogenic bladder with incomplete emptying requiring intermittent cath and indwelling meza. Developed recurrent UTIs and was on Macrobid for UTI ppx. Follows with urology regularly.    Recently in 9/2023 had PSDA UTI and sepsis, required hospitalization. Also hospitalized in 11/27-12/05/2023 for severe anemia (HGB 3.4) and transfused 4 units of PRBCs. PSDA UTI at that time treated with levofloxacin x 7 days.    States that she is here to determine need for recurrent UTI ppx. Since hospitalization in 12/2023, hasn't had another UTI. Started to empty bladder spontaneously in the last month, does not need to self-cath anymore. Now feels fine.       PAST MEDICAL HISTORY:  Past Medical History:   Diagnosis Date    Abdominal pain, LLQ (left lower quadrant) 07/18/2023    Acute UTI 07/18/2023    Cellulitis 07/18/2023    Constipation 07/18/2023    Encounter for other preprocedural examination     Preop testing    Left wrist pain 07/18/2023    Right shoulder pain 07/18/2023    Skin irritation 07/18/2023    Stiffness of right shoulder joint 07/18/2023     PAST SURGICAL HISTORY:  Past Surgical History:   Procedure Laterality Date    OTHER SURGICAL HISTORY  11/01/2017    Nephrectomy Right    US GUIDED BIOPSY LYMPH NODE SUPERFICIAL  12/3/2018    US GUIDED BIOPSY LYMPH NODE SUPERFICIAL 12/3/2018 [facility]    VAGINAL MASS EXCISION  03/09/2015    Excision Of Vaginal Cyst Or Tumor     ALLERGIES:    Allergies   Allergen Reactions    Amoxicillin Hives    Penicillins Hives and Itching    Alendronate Sodium  Unknown    Alendronic Acid Unknown    Piperacillin-Tazobactam Other    Sulfa (Sulfonamide Antibiotics) Hives and Unknown    Clindamycin Itching, Rash and Hives       MEDICATIONS:      Current Outpatient Medications:     acetaminophen (Tylenol) 325 mg tablet, Take 2 tablets (650 mg) by mouth every 6 hours if needed for moderate pain (4 - 6)., Disp: 30 tablet, Rfl: 0    acetaminophen (Tylenol) 500 mg tablet, Take by mouth if needed., Disp: , Rfl:     acyclovir (Zovirax) 400 mg tablet, Take 1 tablet (400 mg) by mouth 2 times a day., Disp: 60 tablet, Rfl: 6    alfuzosin (Uroxatral) 10 mg 24 hr tablet, Take 1 tablet (10 mg) by mouth once daily. Do not crush, chew, or split., Disp: , Rfl:     calcium carbonate-vitamin D3 (Caltrate with Vitamin D3) 600 mg-20 mcg (800 unit) tablet, Take 1 tablet by mouth once daily., Disp: , Rfl:     calcium citrate (Calcitrate) 200 mg (950 mg) tablet, , Disp: , Rfl:     cholecalciferol (Vitamin D-3) 50 mcg (2,000 unit) capsule, Take 2 capsules (100 mcg) by mouth once daily., Disp: , Rfl:     denosumab (Prolia) 60 mg/mL syringe, INJECT 60 MG/ ML SQ EVERY 6 MONTHS--IF POSSIBLE TO BE DONE AT THE [facility], Disp: 1 mL, Rfl: 2    docusate sodium (Colace) 100 mg capsule, Take by mouth., Disp: , Rfl:     ferrous gluconate (Fergon) 324 (38 Fe) mg tablet, Take 1 tablet (38 mg of iron) by mouth once daily with breakfast., Disp: , Rfl:     fluticasone (Flonase) 50 mcg/actuation nasal spray, Administer 1 spray into each nostril once daily., Disp: , Rfl:     multivitamin with minerals (multivitamin-iron-folic acid) tablet, Take 1 tablet by mouth once daily., Disp: , Rfl:     nitrofurantoin, macrocrystal-monohydrate, (Macrobid) 100 mg capsule, Take 1 capsule (100 mg) by mouth twice a day. Take until gone., Disp: , Rfl:     polyethylene glycol (Glycolax, Miralax) 17 gram/dose powder, Take 17 g by mouth., Disp: , Rfl:     valACYclovir (Valtrex) 500 mg tablet, Take 1 tablet (500 mg) by mouth 2 times a day.,  Disp: , Rfl:     wheat dextrin (Benefiber Clear SF, dextrin,) 3 gram/3.5 gram powder in packet, Take by mouth once daily., Disp: , Rfl:     baclofen (Lioresal) 10 mg tablet, Take 0.5-1 tablets (5-10 mg) by mouth 3 times a day as needed. MAY CAUSE DROWSINESS. USE WITH CAUTION., Disp: , Rfl:     calcium carbonate (CALTRATE 600 ORAL), Take by mouth., Disp: , Rfl:     cephalexin (Keftab) 500 mg tablet, Take 1 tablet (500 mg) by mouth 4 times a day., Disp: , Rfl:     ergocalciferol (Vitamin D-2) 1.25 MG (48717 UT) capsule, Take 1 capsule (50,000 Units) by mouth once a week., Disp: , Rfl:     ergocalciferol (Vitamin D-2) 1.25 MG (49116 UT) capsule, Take 1 capsule (50,000 Units) by mouth 3 times a week., Disp: , Rfl:     ertapenem (INVanz) 1 gram injection, , Disp: , Rfl:     lidocaine-prilocaine 4-2.5-2.5 % kit,cream and gel, Apply 1 Tube topically 3 times a day as needed., Disp: , Rfl:     meloxicam (Mobic) 7.5 mg tablet, , Disp: , Rfl:     naltrexone 4.5 mg capsule, Take 1 capsule by mouth once daily., Disp: , Rfl:     pot bicarb/potassium cit/ca (POTASSIUM BICARBONATE ORAL), Take by mouth., Disp: , Rfl:     potassium chloride ER (Micro-K) 8 mEq ER capsule, TAKE ONE CAPSULE DAILY TOGETHER WITH CHLORTHALIDONE, Disp: , Rfl:     Premarin vaginal cream, INSERT 0.5 APPLICATOR BEDTIME 0.5 G PER VAGINA 2 NIGHTS A WEEK, Disp: , Rfl:     tizanidine HCl (TIZANIDINE ORAL), , Disp: , Rfl:     FAMILY HISTORY:   Family History   Problem Relation Name Age of Onset    Endometrial cancer Mother      Hypertension Mother      Obesity Mother      Lung cancer Father      Leukemia Brother      Other (Endometrial carcinoma) Mother          SOCIAL HISTORY:       Marital Status:  Tobacco / Smokeless tobacco/ Vaping:   reports that she has never smoked. She has never used smokeless tobacco.   Alcohol:   Social History     Substance and Sexual Activity   Alcohol Use Never      Drug Use:    Social History     Substance and Sexual Activity   Drug  "Use Never         IMMUNIZATIONS:    Immunization History   Administered Date(s) Administered    Flu vaccine (IIV4), preservative free *Check age/dose* 02/08/2017, 10/18/2018, 09/15/2022    Flu vaccine, quadrivalent, no egg protein, age 6 month or greater (FLUCELVAX) 01/20/2018, 10/29/2019, 09/04/2020, 09/29/2021    Influenza, seasonal, injectable 09/09/2015    Pfizer COVID-19 vaccine, bivalent, age 12 years and older (30 mcg/0.3 mL) 09/15/2022    Pfizer Purple Cap SARS-CoV-2 03/31/2022    Zoster vaccine, recombinant, adult (SHINGRIX) 09/25/2020       REVIEW OF SYSTEMS:  Review of Systems   Constitutional: Negative.    HENT: Negative.     Eyes: Negative.    Respiratory: Negative.     Cardiovascular: Negative.    Gastrointestinal: Negative.    Endocrine: Negative.    Genitourinary: Negative.    Musculoskeletal: Negative.    Skin: Negative.    Allergic/Immunologic: Negative.    Neurological: Negative.    Hematological: Negative.    Psychiatric/Behavioral: Negative.         PHYSICAL EXAMINATION:     Visit Vitals  /81 (BP Location: Right arm, Patient Position: Sitting, BP Cuff Size: Large adult)   Pulse 97   Temp 36.8 °C (98.2 °F)   Ht 1.702 m (5' 7\")   Wt 77.5 kg (170 lb 12.8 oz)   BMI 26.75 kg/m²   OB Status Postmenopausal   Smoking Status Never   BSA 1.91 m²        EXAM:    Physical Exam  Vitals reviewed.   Constitutional:       Appearance: Normal appearance.   HENT:      Head: Normocephalic and atraumatic.      Mouth/Throat:      Mouth: Mucous membranes are moist.      Pharynx: Oropharynx is clear.   Cardiovascular:      Rate and Rhythm: Normal rate and regular rhythm.   Pulmonary:      Effort: Pulmonary effort is normal.   Abdominal:      General: Abdomen is flat. Bowel sounds are normal.      Palpations: Abdomen is soft.   Musculoskeletal:         General: Normal range of motion.      Cervical back: Normal range of motion.      Right lower leg: Edema present.      Left lower leg: Edema present.   Skin:     " General: Skin is warm.   Neurological:      General: No focal deficit present.      Mental Status: She is alert and oriented to person, place, and time.   Psychiatric:         Mood and Affect: Mood normal.         Behavior: Behavior normal.         Thought Content: Thought content normal.         Judgment: Judgment normal.        DATA:    No results found for the last 90 days.     Microbiology:  11/27/2023 UCX NG  09/23/2023 BCX NG  09/21/2023 UCX PSDA (pan-sens)  09/21/2023 BCX PSDA (pan-sens)  08/22/2023 UCX K. Oxytoca (S=cipro, bactrim, cefepime) + ESBL E. Coli  08/22/2023 BC NG    ASSESSMENT / RECOMMENDATIONS:  Laurence Campbell is a 58-year-old female, has a history of HTN, chronic CYNTHIA, and Stage 3 vaginal cancer s/p chemoradiation (2004) with complications such as radiation cystitis, radiation proctitis, ureter obstruction requiring stents, and right nephrectomy. She developed a neurogenic bladder, necessitating intermittent cath and an indwelling meza, and had recurrent UTIs for which she was on Macrobid. Recently, in 9/2023, she had a PSDA UTI and sepsis, requiring hospitalization, and in 11/27-12/05/2023, she was hospitalized for severe anemia (HGB 3.4) and received 4 units of PRBCs, with a PSDA UTI treated with levofloxacin.    Seeking recurrent UTI prophylaxis necessity, post-hospitalization in 12/2023. No recent UTI, spontaneously emptying bladder for a month, no self-catheterization needed, and feels fine.    PLAN  Would hold off on UTI ppx for now  Advised to call back if has new UTI.  Follow up with urology     Pt is in agreement with plan. Feels satisfied that all her questions were answered.    I spent 30 minutes in the professional and overall care of this patient.      Marcelino Tirado MD MPH

## 2024-01-29 DIAGNOSIS — D50.9 IRON DEFICIENCY ANEMIA, UNSPECIFIED IRON DEFICIENCY ANEMIA TYPE: ICD-10-CM

## 2024-01-29 RX ORDER — FERROUS GLUCONATE 325 MG
38 TABLET ORAL
Qty: 90 TABLET | Refills: 3 | Status: SHIPPED | OUTPATIENT
Start: 2024-01-29 | End: 2025-01-28

## 2024-02-02 ENCOUNTER — OFFICE VISIT (OUTPATIENT)
Dept: PRIMARY CARE | Facility: CLINIC | Age: 59
End: 2024-02-02
Payer: MEDICARE

## 2024-02-02 VITALS
BODY MASS INDEX: 27 KG/M2 | DIASTOLIC BLOOD PRESSURE: 100 MMHG | HEART RATE: 96 BPM | SYSTOLIC BLOOD PRESSURE: 148 MMHG | WEIGHT: 172 LBS | OXYGEN SATURATION: 100 % | HEIGHT: 67 IN

## 2024-02-02 DIAGNOSIS — I10 BENIGN ESSENTIAL HTN: Primary | ICD-10-CM

## 2024-02-02 DIAGNOSIS — H69.93 DYSFUNCTION OF BOTH EUSTACHIAN TUBES: ICD-10-CM

## 2024-02-02 PROCEDURE — 3080F DIAST BP >= 90 MM HG: CPT

## 2024-02-02 PROCEDURE — 99213 OFFICE O/P EST LOW 20 MIN: CPT

## 2024-02-02 PROCEDURE — 1036F TOBACCO NON-USER: CPT

## 2024-02-02 PROCEDURE — 3077F SYST BP >= 140 MM HG: CPT

## 2024-02-02 ASSESSMENT — ENCOUNTER SYMPTOMS
STRIDOR: 0
NAUSEA: 0
HEADACHES: 0
FATIGUE: 0
CONFUSION: 0
NECK PAIN: 0
EYE DISCHARGE: 0
ENDOCRINE NEGATIVE: 1
SORE THROAT: 0
NUMBNESS: 0
NERVOUS/ANXIOUS: 0
APNEA: 0
FREQUENCY: 0
HYPERACTIVE: 0
TREMORS: 0
RECTAL PAIN: 0
DYSPHORIC MOOD: 0
SLEEP DISTURBANCE: 0
DYSURIA: 0
EYES NEGATIVE: 1
VOICE CHANGE: 0
PHOTOPHOBIA: 0
DIFFICULTY URINATING: 0
JOINT SWELLING: 0
SHORTNESS OF BREATH: 0
RHINORRHEA: 0
TROUBLE SWALLOWING: 0
GASTROINTESTINAL NEGATIVE: 1
PSYCHIATRIC NEGATIVE: 1
CHILLS: 0
BLOOD IN STOOL: 0
AGITATION: 0
CARDIOVASCULAR NEGATIVE: 1
BRUISES/BLEEDS EASILY: 0
SEIZURES: 0
MUSCULOSKELETAL NEGATIVE: 1
DIARRHEA: 0
COLOR CHANGE: 0
HEMATOLOGIC/LYMPHATIC NEGATIVE: 1
UNEXPECTED WEIGHT CHANGE: 0
CHEST TIGHTNESS: 0
SINUS PRESSURE: 0
RESPIRATORY NEGATIVE: 1
POLYDIPSIA: 0
DIZZINESS: 0
FLANK PAIN: 0
WHEEZING: 0
POLYPHAGIA: 0
MYALGIAS: 0
ACTIVITY CHANGE: 0
COUGH: 0
ABDOMINAL PAIN: 0
LIGHT-HEADEDNESS: 0
BACK PAIN: 0
ANAL BLEEDING: 0
PALPITATIONS: 0
CONSTITUTIONAL NEGATIVE: 1
FEVER: 0
SPEECH DIFFICULTY: 0
NECK STIFFNESS: 0
WEAKNESS: 0
DIAPHORESIS: 0
CONSTIPATION: 0
NEUROLOGICAL NEGATIVE: 1
ABDOMINAL DISTENTION: 0
HEMATURIA: 0
APPETITE CHANGE: 0

## 2024-02-02 NOTE — PROGRESS NOTES
Primary Care Provider: Maninder Yeager MD    Subjective   Laurence Campbell is a 58 y.o. female who presents for Follow-up (BP check).    PMH of HTN, chronic CYNTHIA, and Stage 3 vaginal cancer s/p chemoradiation (2004) with complications such as radiation cystitis, radiation proctitis, ureter obstruction requiring stents, and right nephrectomy- she developed a neurogenic bladder, necessitating intermittent cath and an indwelling meza; recurrent UTIs. She had some hospitalizations: 9/2023, she had a PSDA UTI and sepsis; and in 11/27-12/05/2023, she was hospitalized for severe anemia (HGB 3.8).    Elevated BP    During her hyperbariatric wound treatments she gets her BP taken; has been 140/108, 151/92, 149/91, 139/91, 159/158/87053/95, 155/86, 151/96, 165/99, 147/90, 150/98, 164/95  Get BL ear pressure/ discomfort  She has been taking Sudafed, 2 pills a day regularly since her BP increased  She is also using fluticasone nasal spray  No racing HR, no dizziness, no palpitations  No chest pain, no shortness of breath, BM regular, no trouble urinating         Review of Systems   Constitutional: Negative.  Negative for activity change, appetite change, chills, diaphoresis, fatigue, fever and unexpected weight change.   HENT:  Positive for congestion. Negative for dental problem, ear discharge, hearing loss, mouth sores, nosebleeds, postnasal drip, rhinorrhea, sinus pressure, sneezing, sore throat, tinnitus, trouble swallowing and voice change.    Eyes: Negative.  Negative for photophobia, discharge and visual disturbance.   Respiratory: Negative.  Negative for apnea, cough, chest tightness, shortness of breath, wheezing and stridor.    Cardiovascular: Negative.  Negative for chest pain, palpitations and leg swelling.   Gastrointestinal: Negative.  Negative for abdominal distention, abdominal pain, anal bleeding, blood in stool, constipation, diarrhea, nausea and rectal pain.   Endocrine: Negative.  Negative for cold  "intolerance, heat intolerance, polydipsia, polyphagia and polyuria.   Genitourinary: Negative.  Negative for decreased urine volume, difficulty urinating, dysuria, flank pain, frequency, hematuria and urgency.   Musculoskeletal: Negative.  Negative for back pain, gait problem, joint swelling, myalgias, neck pain and neck stiffness.   Skin: Negative.  Negative for color change and rash.   Neurological: Negative.  Negative for dizziness, tremors, seizures, syncope, speech difficulty, weakness, light-headedness, numbness and headaches.   Hematological: Negative.  Does not bruise/bleed easily.   Psychiatric/Behavioral: Negative.  Negative for agitation, confusion, dysphoric mood, sleep disturbance and suicidal ideas. The patient is not nervous/anxious and is not hyperactive.    All other systems reviewed and are negative.        Objective   BP (!) 148/100   Pulse 96   Ht 1.702 m (5' 7\")   Wt 78 kg (172 lb)   SpO2 100%   BMI 26.94 kg/m²     Physical Exam  Vitals reviewed.   Constitutional:       General: She is not in acute distress.     Appearance: Normal appearance. She is normal weight. She is not ill-appearing, toxic-appearing or diaphoretic.   HENT:      Head: Normocephalic and atraumatic.      Right Ear: Tympanic membrane, ear canal and external ear normal.      Left Ear: Tympanic membrane, ear canal and external ear normal.      Nose: Nose normal.   Eyes:      Conjunctiva/sclera: Conjunctivae normal.   Cardiovascular:      Rate and Rhythm: Normal rate and regular rhythm.      Pulses: Normal pulses.      Heart sounds: Normal heart sounds. No murmur heard.     No friction rub. No gallop.   Pulmonary:      Effort: Pulmonary effort is normal. No respiratory distress.      Breath sounds: Normal breath sounds.   Abdominal:      General: Abdomen is flat. Bowel sounds are normal.      Palpations: Abdomen is soft.   Musculoskeletal:         General: Normal range of motion.      Cervical back: Normal range of motion " and neck supple.      Left lower leg: Edema present.   Skin:     General: Skin is warm and dry.      Capillary Refill: Capillary refill takes less than 2 seconds.   Neurological:      General: No focal deficit present.      Mental Status: She is alert and oriented to person, place, and time. Mental status is at baseline.   Psychiatric:         Mood and Affect: Mood normal.         Behavior: Behavior normal.         Thought Content: Thought content normal.         Judgment: Judgment normal.         Assessment/Plan   Problem List Items Addressed This Visit       Benign essential HTN - Primary    Dysfunction of both eustachian tubes     Please stop taking the Sudafed, as I believe this is what is causing the abrupt increased in your blood pressure.    For your ears you can continue with the fluticasone nasal spray, you can use this twice a day-once in the morning and once in the evening  Instead of Sudafed, you can try taking one of the following:  --Coricidin or Coricidin HBP, is the name of an over-the-counter cough and cold/ decongestant  -- Allegra, or Zyrtec or Claritin; just make sure that they do not contain the decongestant (for example avoid Allegra-D)    Work on low-sodium diet    Since you are getting your blood pressure checked regularly with your treatments, please call or message me in 7-10 days with your blood pressures off Sudafed, if you are still 140/90 or higher I will start you on a blood pressure medication and then have you follow-up in office with me    I will start her on amlodipine 2.5mg if her BP remains above goal off the Sudafed    please call or message me in 7-10 days with your blood pressures off Sudafed or follow up sooner if needed

## 2024-02-02 NOTE — PATIENT INSTRUCTIONS
Please stop taking the Sudafed, as I believe this is what is causing the abrupt increased in your blood pressure.    For your ears you can continue with the fluticasone nasal spray, you can use this twice a day-once in the morning and once in the evening  Instead of Sudafed, you can try taking one of the following:  --Coricidin or Coricidin HBP, is the name of an over-the-counter cough and cold/ decongestant  -- Allegra, or Zyrtec or Claritin; just make sure that they do not contain the decongestant (for example avoid Allegra-D)    Work on low-sodium diet    Since you are getting your blood pressure checked regularly with your treatments, please call or message me in 7-10 days with your blood pressures off Sudafed, if you are still 140/90 or higher I will start you on a blood pressure medication and then have you follow-up in office with me

## 2024-02-19 ENCOUNTER — APPOINTMENT (OUTPATIENT)
Dept: PRIMARY CARE | Facility: CLINIC | Age: 59
End: 2024-02-19
Payer: MEDICARE

## 2024-02-28 ENCOUNTER — TELEPHONE (OUTPATIENT)
Dept: PRIMARY CARE | Facility: CLINIC | Age: 59
End: 2024-02-28
Payer: MEDICARE

## 2024-02-28 NOTE — TELEPHONE ENCOUNTER
Called patient back and informed her that I got her message about her heart racing and I spoke with Rmb and he recommended that she go to the emergency room. She was in full understanding.       Lianna Sandhu MA

## 2024-03-01 ENCOUNTER — PATIENT OUTREACH (OUTPATIENT)
Dept: CARE COORDINATION | Facility: CLINIC | Age: 59
End: 2024-03-01
Payer: MEDICARE

## 2024-03-04 ENCOUNTER — SPECIALTY PHARMACY (OUTPATIENT)
Dept: PHARMACY | Facility: CLINIC | Age: 59
End: 2024-03-04

## 2024-03-04 PROCEDURE — RXMED WILLOW AMBULATORY MEDICATION CHARGE

## 2024-03-05 ENCOUNTER — PHARMACY VISIT (OUTPATIENT)
Dept: PHARMACY | Facility: CLINIC | Age: 59
End: 2024-03-05
Payer: MEDICARE

## 2024-03-06 ENCOUNTER — APPOINTMENT (OUTPATIENT)
Dept: HEMATOLOGY/ONCOLOGY | Facility: HOSPITAL | Age: 59
End: 2024-03-06
Payer: MEDICARE

## 2024-03-09 ENCOUNTER — HOSPITAL ENCOUNTER (EMERGENCY)
Facility: HOSPITAL | Age: 59
Discharge: HOME | End: 2024-03-09
Attending: EMERGENCY MEDICINE
Payer: MEDICARE

## 2024-03-09 ENCOUNTER — APPOINTMENT (OUTPATIENT)
Dept: RADIOLOGY | Facility: HOSPITAL | Age: 59
End: 2024-03-09
Payer: MEDICARE

## 2024-03-09 VITALS
RESPIRATION RATE: 18 BRPM | SYSTOLIC BLOOD PRESSURE: 148 MMHG | HEIGHT: 67 IN | BODY MASS INDEX: 26.64 KG/M2 | TEMPERATURE: 97.5 F | OXYGEN SATURATION: 99 % | HEART RATE: 71 BPM | DIASTOLIC BLOOD PRESSURE: 99 MMHG | WEIGHT: 169.75 LBS

## 2024-03-09 DIAGNOSIS — S39.012A LUMBAR STRAIN, INITIAL ENCOUNTER: Primary | ICD-10-CM

## 2024-03-09 PROCEDURE — 96372 THER/PROPH/DIAG INJ SC/IM: CPT

## 2024-03-09 PROCEDURE — 72100 X-RAY EXAM L-S SPINE 2/3 VWS: CPT | Performed by: RADIOLOGY

## 2024-03-09 PROCEDURE — 99283 EMERGENCY DEPT VISIT LOW MDM: CPT

## 2024-03-09 PROCEDURE — 72100 X-RAY EXAM L-S SPINE 2/3 VWS: CPT

## 2024-03-09 PROCEDURE — 2500000004 HC RX 250 GENERAL PHARMACY W/ HCPCS (ALT 636 FOR OP/ED): Performed by: EMERGENCY MEDICINE

## 2024-03-09 RX ORDER — CYCLOBENZAPRINE HCL 10 MG
5 TABLET ORAL 3 TIMES DAILY
Qty: 15 TABLET | Refills: 0 | Status: SHIPPED | OUTPATIENT
Start: 2024-03-09 | End: 2024-03-29 | Stop reason: SDUPTHER

## 2024-03-09 RX ORDER — KETOROLAC TROMETHAMINE 30 MG/ML
30 INJECTION, SOLUTION INTRAMUSCULAR; INTRAVENOUS ONCE
Status: COMPLETED | OUTPATIENT
Start: 2024-03-09 | End: 2024-03-09

## 2024-03-09 RX ORDER — KETOROLAC TROMETHAMINE 10 MG/1
10 TABLET, FILM COATED ORAL EVERY 6 HOURS PRN
Qty: 20 TABLET | Refills: 0 | Status: SHIPPED | OUTPATIENT
Start: 2024-03-09 | End: 2024-03-18 | Stop reason: ALTCHOICE

## 2024-03-09 RX ADMIN — KETOROLAC TROMETHAMINE 30 MG: 30 INJECTION INTRAMUSCULAR; INTRAVENOUS at 08:50

## 2024-03-09 ASSESSMENT — PAIN SCALES - GENERAL
PAINLEVEL_OUTOF10: 8
PAINLEVEL_OUTOF10: 5 - MODERATE PAIN
PAINLEVEL_OUTOF10: 7

## 2024-03-09 ASSESSMENT — PAIN - FUNCTIONAL ASSESSMENT
PAIN_FUNCTIONAL_ASSESSMENT: 0-10
PAIN_FUNCTIONAL_ASSESSMENT: 0-10

## 2024-03-09 ASSESSMENT — PAIN DESCRIPTION - LOCATION
LOCATION: BACK
LOCATION: BACK

## 2024-03-09 ASSESSMENT — PAIN DESCRIPTION - PAIN TYPE: TYPE: ACUTE PAIN
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
## 2024-03-09 NOTE — DISCHARGE INSTRUCTIONS
Take medication as directed  Please return to the ER if you develop new symptoms especially weakness in your leg  Follow-up with your PCP

## 2024-03-13 DIAGNOSIS — N13.1 HYDRONEPHROSIS CONCURRENT WITH AND DUE TO URETERAL STRICTURE: Primary | ICD-10-CM

## 2024-03-18 ENCOUNTER — OFFICE VISIT (OUTPATIENT)
Dept: PRIMARY CARE | Facility: CLINIC | Age: 59
End: 2024-03-18
Payer: MEDICARE

## 2024-03-18 VITALS — HEART RATE: 82 BPM | SYSTOLIC BLOOD PRESSURE: 148 MMHG | OXYGEN SATURATION: 100 % | DIASTOLIC BLOOD PRESSURE: 88 MMHG

## 2024-03-18 DIAGNOSIS — I10 BENIGN ESSENTIAL HTN: Primary | ICD-10-CM

## 2024-03-18 PROCEDURE — 3077F SYST BP >= 140 MM HG: CPT | Performed by: INTERNAL MEDICINE

## 2024-03-18 PROCEDURE — 3079F DIAST BP 80-89 MM HG: CPT | Performed by: INTERNAL MEDICINE

## 2024-03-18 PROCEDURE — 1036F TOBACCO NON-USER: CPT | Performed by: INTERNAL MEDICINE

## 2024-03-18 PROCEDURE — 99213 OFFICE O/P EST LOW 20 MIN: CPT | Performed by: INTERNAL MEDICINE

## 2024-03-18 RX ORDER — AMLODIPINE BESYLATE 2.5 MG/1
2.5 TABLET ORAL DAILY
Qty: 30 TABLET | Refills: 5 | Status: SHIPPED | OUTPATIENT
Start: 2024-03-18 | End: 2024-09-14
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
## 2024-03-20 ENCOUNTER — PRE-ADMISSION TESTING (OUTPATIENT)
Dept: PREADMISSION TESTING | Facility: HOSPITAL | Age: 59
End: 2024-03-20
Payer: MEDICARE

## 2024-03-20 VITALS
WEIGHT: 173.28 LBS | BODY MASS INDEX: 27.2 KG/M2 | DIASTOLIC BLOOD PRESSURE: 95 MMHG | RESPIRATION RATE: 16 BRPM | SYSTOLIC BLOOD PRESSURE: 139 MMHG | TEMPERATURE: 97 F | HEIGHT: 67 IN | OXYGEN SATURATION: 100 % | HEART RATE: 91 BPM

## 2024-03-20 DIAGNOSIS — E55.9 AVITAMINOSIS D: ICD-10-CM

## 2024-03-20 DIAGNOSIS — Z01.818 PREOPERATIVE TESTING: Primary | ICD-10-CM

## 2024-03-20 DIAGNOSIS — M85.80 OSTEOPENIA, UNSPECIFIED LOCATION: ICD-10-CM

## 2024-03-20 LAB
25(OH)D3 SERPL-MCNC: 40 NG/ML (ref 31–100)
ALBUMIN SERPL BCP-MCNC: 3.9 G/DL (ref 3.4–5)
ANION GAP SERPL CALC-SCNC: 14 MMOL/L (ref 10–20)
APPEARANCE UR: ABNORMAL
BACTERIA #/AREA URNS AUTO: ABNORMAL /HPF
BASOPHILS # BLD AUTO: 0.03 X10*3/UL (ref 0–0.1)
BASOPHILS NFR BLD AUTO: 0.5 %
BILIRUB UR STRIP.AUTO-MCNC: NEGATIVE MG/DL
BUN SERPL-MCNC: 15 MG/DL (ref 6–23)
CALCIUM SERPL-MCNC: 9.6 MG/DL (ref 8.6–10.3)
CHLORIDE SERPL-SCNC: 106 MMOL/L (ref 98–107)
CO2 SERPL-SCNC: 22 MMOL/L (ref 21–32)
COLOR UR: ABNORMAL
CREAT SERPL-MCNC: 1.09 MG/DL (ref 0.5–1.05)
EGFRCR SERPLBLD CKD-EPI 2021: 59 ML/MIN/1.73M*2
EOSINOPHIL # BLD AUTO: 0.13 X10*3/UL (ref 0–0.7)
EOSINOPHIL NFR BLD AUTO: 2.2 %
ERYTHROCYTE [DISTWIDTH] IN BLOOD BY AUTOMATED COUNT: 15.9 % (ref 11.5–14.5)
GLUCOSE SERPL-MCNC: 97 MG/DL (ref 74–99)
GLUCOSE UR STRIP.AUTO-MCNC: NEGATIVE MG/DL
HCT VFR BLD AUTO: 35.3 % (ref 36–46)
HGB BLD-MCNC: 10.9 G/DL (ref 12–16)
HOLD SPECIMEN: NORMAL
IMM GRANULOCYTES # BLD AUTO: 0.02 X10*3/UL (ref 0–0.7)
IMM GRANULOCYTES NFR BLD AUTO: 0.3 % (ref 0–0.9)
KETONES UR STRIP.AUTO-MCNC: NEGATIVE MG/DL
LEUKOCYTE ESTERASE UR QL STRIP.AUTO: ABNORMAL
LYMPHOCYTES # BLD AUTO: 1.62 X10*3/UL (ref 1.2–4.8)
LYMPHOCYTES NFR BLD AUTO: 27.8 %
MCH RBC QN AUTO: 24.4 PG (ref 26–34)
MCHC RBC AUTO-ENTMCNC: 30.9 G/DL (ref 32–36)
MCV RBC AUTO: 79 FL (ref 80–100)
MONOCYTES # BLD AUTO: 0.61 X10*3/UL (ref 0.1–1)
MONOCYTES NFR BLD AUTO: 10.5 %
NEUTROPHILS # BLD AUTO: 3.41 X10*3/UL (ref 1.2–7.7)
NEUTROPHILS NFR BLD AUTO: 58.7 %
NITRITE UR QL STRIP.AUTO: POSITIVE
NRBC BLD-RTO: ABNORMAL /100{WBCS}
PH UR STRIP.AUTO: 6 [PH]
PHOSPHATE SERPL-MCNC: 3.8 MG/DL (ref 2.5–4.9)
PLATELET # BLD AUTO: 354 X10*3/UL (ref 150–450)
POTASSIUM SERPL-SCNC: 4.2 MMOL/L (ref 3.5–5.3)
PROT UR STRIP.AUTO-MCNC: ABNORMAL MG/DL
RBC # BLD AUTO: 4.46 X10*6/UL (ref 4–5.2)
RBC # UR STRIP.AUTO: ABNORMAL /UL
RBC #/AREA URNS AUTO: >20 /HPF
SODIUM SERPL-SCNC: 138 MMOL/L (ref 136–145)
SP GR UR STRIP.AUTO: 1.01
UROBILINOGEN UR STRIP.AUTO-MCNC: 0.2 MG/DL
WBC # BLD AUTO: 5.8 X10*3/UL (ref 4.4–11.3)
WBC #/AREA URNS AUTO: >50 /HPF
WBC CLUMPS #/AREA URNS AUTO: ABNORMAL /HPF

## 2024-03-20 PROCEDURE — 87086 URINE CULTURE/COLONY COUNT: CPT

## 2024-03-20 PROCEDURE — 99213 OFFICE O/P EST LOW 20 MIN: CPT | Performed by: NURSE PRACTITIONER

## 2024-03-20 PROCEDURE — 87186 SC STD MICRODIL/AGAR DIL: CPT

## 2024-03-20 PROCEDURE — 80069 RENAL FUNCTION PANEL: CPT

## 2024-03-20 PROCEDURE — 81001 URINALYSIS AUTO W/SCOPE: CPT

## 2024-03-20 PROCEDURE — 85025 COMPLETE CBC W/AUTO DIFF WBC: CPT

## 2024-03-20 PROCEDURE — 82306 VITAMIN D 25 HYDROXY: CPT

## 2024-03-20 PROCEDURE — 36415 COLL VENOUS BLD VENIPUNCTURE: CPT

## 2024-03-20 RX ORDER — ACYCLOVIR 400 MG/1
400 TABLET ORAL 2 TIMES DAILY
COMMUNITY

## 2024-03-20 ASSESSMENT — ENCOUNTER SYMPTOMS
EYES NEGATIVE: 1
GASTROINTESTINAL NEGATIVE: 1
PSYCHIATRIC NEGATIVE: 1
CONSTITUTIONAL NEGATIVE: 1
ALLERGIC/IMMUNOLOGIC NEGATIVE: 1
RESPIRATORY NEGATIVE: 1

## 2024-03-20 ASSESSMENT — PAIN - FUNCTIONAL ASSESSMENT: PAIN_FUNCTIONAL_ASSESSMENT: 0-10

## 2024-03-20 ASSESSMENT — PAIN DESCRIPTION - DESCRIPTORS: DESCRIPTORS: THROBBING;SHARP

## 2024-03-20 ASSESSMENT — PAIN SCALES - GENERAL: PAINLEVEL_OUTOF10: 6

## 2024-03-20 NOTE — CPM/PAT H&P
CPM/PAT Evaluation   Laurence Campbell is a 58 y.o. female   Chief Complaint: having a stent placed and old stent removed    HPI:  Patient is a 57 y/o alert and oriented female coming in for PAT for a scheduled cystoscopy w/ insertion and removal stent ureter on 3/28/24 w/ Dr. Carbajal.    The patient reports constant sharp, throbbing left lower back pain.  She states tylenol helps..    Patient denies chest pain, SOB, PEACE and NVDC.    Patient also denies Hx: DVT/PE.    Current medications were reviewed and a presurgical mediation schedule was provided.    She has no questions at this time.   Past Medical History:   Diagnosis Date    Abdominal pain, LLQ (left lower quadrant) 07/18/2023    Acute UTI 07/18/2023    Cellulitis 07/18/2023    Constipation 07/18/2023    Encounter for other preprocedural examination     Preop testing    Hx of hyperparathyroidism     Hx of malignant neoplasm of vagina     Iron deficiency anemia     Left wrist pain 07/18/2023    Neurogenic bladder     Neuropathy     Osteoporosis     Recurrent UTI     Right shoulder pain 07/18/2023    Skin irritation 07/18/2023    Stiffness of right shoulder joint 07/18/2023    Vaginal adhesions       Past Surgical History:   Procedure Laterality Date    OTHER SURGICAL HISTORY  11/01/2017    Nephrectomy Right    PARATHYROID GLAND SURGERY      US GUIDED BIOPSY LYMPH NODE SUPERFICIAL  12/03/2018    US GUIDED BIOPSY LYMPH NODE SUPERFICIAL 12/3/2018 [facility]    VAGINAL MASS EXCISION  03/09/2015    Excision Of Vaginal Cyst Or Tumor        Allergies   Allergen Reactions    Amoxicillin Hives    Penicillins Hives and Itching    Alendronate Sodium Unknown    Alendronic Acid Unknown    Piperacillin-Tazobactam Other    Sulfa (Sulfonamide Antibiotics) Hives and Unknown    Clindamycin Itching, Rash and Hives        Current Outpatient Medications on File Prior to Visit   Medication Sig Dispense Refill    acetaminophen (Tylenol) 500 mg tablet Take 1 tablet (500 mg) by  mouth every 6 hours if needed for mild pain (1 - 3).      acyclovir (Zovirax) 400 mg tablet Take 1 tablet (400 mg) by mouth 2 times a day.      alfuzosin (Uroxatral) 10 mg 24 hr tablet Take 1 tablet (10 mg) by mouth once daily. Do not crush, chew, or split.      amLODIPine (Norvasc) 2.5 mg tablet Take 1 tablet (2.5 mg) by mouth once daily. 30 tablet 5    calcium carbonate-vitamin D3 (Caltrate with Vitamin D3) 600 mg-20 mcg (800 unit) tablet Take 1 tablet by mouth once daily.      cholecalciferol (Vitamin D-3) 50 mcg (2,000 unit) capsule Take 2 capsules (100 mcg) by mouth once daily.      cyclobenzaprine (Flexeril) 10 mg tablet Take 0.5 tablets (5 mg) by mouth 3 times a day for 10 days. 15 tablet 0    docusate sodium (Colace) 100 mg capsule Take by mouth 2 times a day as needed for constipation.      ferrous gluconate (Fergon) 324 (38 Fe) mg tablet Take 1 tablet (38 mg of iron) by mouth once daily with breakfast. 90 tablet 3    fluticasone (Flonase) 50 mcg/actuation nasal spray Administer 1 spray into each nostril once daily.      multivitamin with minerals (multivitamin-iron-folic acid) tablet Take 1 tablet by mouth once daily.      polyethylene glycol (Glycolax, Miralax) 17 gram/dose powder Take 17 g by mouth once daily as needed.      denosumab (Prolia) 60 mg/mL syringe Physician's office to inject 60mg (1 prefilled syringe) beneath the skin once every 6 months. For office use only. 1 mL 2    [DISCONTINUED] acetaminophen (Tylenol) 325 mg tablet Take 2 tablets (650 mg) by mouth every 6 hours if needed for moderate pain (4 - 6). 30 tablet 0    [DISCONTINUED] baclofen (Lioresal) 10 mg tablet Take 0.5-1 tablets (5-10 mg) by mouth 3 times a day as needed. MAY CAUSE DROWSINESS. USE WITH CAUTION.      [DISCONTINUED] calcium carbonate (CALTRATE 600 ORAL) Take by mouth.      [DISCONTINUED] calcium citrate (Calcitrate) 200 mg (950 mg) tablet       [DISCONTINUED] cephalexin (Keftab) 500 mg tablet Take 1 tablet (500 mg) by  mouth 4 times a day.      [DISCONTINUED] ergocalciferol (Vitamin D-2) 1.25 MG (48871 UT) capsule Take 1 capsule (50,000 Units) by mouth once a week.      [DISCONTINUED] ergocalciferol (Vitamin D-2) 1.25 MG (14973 UT) capsule Take 1 capsule (50,000 Units) by mouth 3 times a week.      [DISCONTINUED] ertapenem (INVanz) 1 gram injection       [DISCONTINUED] ketorolac (Toradol) 10 mg tablet Take 1 tablet (10 mg) by mouth every 6 hours if needed for moderate pain (4 - 6) for up to 5 days. (Patient not taking: Reported on 3/18/2024) 20 tablet 0    [DISCONTINUED] lidocaine-prilocaine 4-2.5-2.5 % kit,cream and gel Apply 1 Tube topically 3 times a day as needed.      [DISCONTINUED] naltrexone 4.5 mg capsule Take 1 capsule by mouth once daily.      [DISCONTINUED] nitrofurantoin, macrocrystal-monohydrate, (Macrobid) 100 mg capsule Take 1 capsule (100 mg) by mouth twice a day. Take until gone.      [DISCONTINUED] pot bicarb/potassium cit/ca (POTASSIUM BICARBONATE ORAL) Take by mouth.      [DISCONTINUED] potassium chloride ER (Micro-K) 8 mEq ER capsule TAKE ONE CAPSULE DAILY TOGETHER WITH CHLORTHALIDONE      [DISCONTINUED] Premarin vaginal cream INSERT 0.5 APPLICATOR BEDTIME 0.5 G PER VAGINA 2 NIGHTS A WEEK      [DISCONTINUED] tizanidine HCl (TIZANIDINE ORAL)       [DISCONTINUED] valACYclovir (Valtrex) 500 mg tablet Take 1 tablet (500 mg) by mouth 2 times a day.      [DISCONTINUED] wheat dextrin (Benefiber Clear SF, dextrin,) 3 gram/3.5 gram powder in packet Take by mouth once daily.       No current facility-administered medications on file prior to visit.       Vitals:    03/20/24 0821   BP: (!) 139/95   Pulse: 91   Resp: 16   Temp: 36.1 °C (97 °F)   SpO2: 100%       Review of Systems   Constitutional: Negative.    HENT: Negative.     Eyes: Negative.    Respiratory: Negative.     Cardiovascular:         Hypertension   Gastrointestinal: Negative.    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    Allergic/Immunologic: Negative.    Neurological:         Neuropathy   Hematological:         Iron deficiency anemia    Psychiatric/Behavioral: Negative.        Physical Exam  Vitals and nursing note reviewed.   Constitutional:       Appearance: Normal appearance.   HENT:      Head: Normocephalic and atraumatic.      Mouth/Throat:      Mouth: Mucous membranes are moist.      Pharynx: Oropharynx is clear.   Eyes:      Pupils: Pupils are equal, round, and reactive to light.   Cardiovascular:      Rate and Rhythm: Normal rate and regular rhythm.      Heart sounds: Normal heart sounds.      Comments: EKG on 11/29/2023 shows NSR rate of 76  Pulmonary:      Effort: Pulmonary effort is normal.      Breath sounds: Normal breath sounds.   Abdominal:      General: Bowel sounds are normal.      Palpations: Abdomen is soft.   Musculoskeletal:         General: Normal range of motion.      Cervical back: Normal range of motion and neck supple.   Skin:     General: Skin is warm and dry.   Neurological:      General: No focal deficit present.      Mental Status: She is alert and oriented to person, place, and time.   Psychiatric:         Mood and Affect: Mood normal.         Behavior: Behavior normal.         Thought Content: Thought content normal.         Judgment: Judgment normal.        PAT AIRWAY:   Airway:     Mallampati::  IV    TM distance::  >3 FB    Neck ROM::  Full  Has permanent upper bridge  Does not smoke  Social alcohol use  No drug use  No issues with anesthesia  No family issues with anesthesia    Assessment and Plan:   Hydronephrosis w/ ureteral stricture  Cystoscopy with insertion and removal stent ureter  Managed with alfuzosin 10mg daily    Hx:  Malignant Neoplasm Vagina  S/p chemo & radiation 2004  S/p right nephrectomy    Radiation Cystitis / Proctitis  Hyperbaric therapy  Vaginal adhesions    Neurogenic Bladder / Recurrent UTI's  Antibiotic therapy as needed  Follow up as needed with infectious disease  Follow up with urology     Neuropathy  Managed with baclofen 10mg 1/2 -1 tablet tid prn    Hypertension  Managed with amlodipine 2.5mg daily  Last EKG 11/29/2023 NSR rate of 76  Denies headaches, dizziness, lightheadedness, chest pain, pressure or palpitations    Osteoporosis  Managed with prolia 60 mg every 6 months  Follow up with endocrinology as scheduled    Hyperparathyroid  S/p parathyroidectomy  Last TSH was 0.99 on 12/19/2023    Iron Deficiency Anemia  Managed with ferrous gluconate 324mg daily  Last iron level 13 on 12/19/2023  H&H 10.3 & 33.4 on 12/19/2023    ASA III  RCRI - 0 points  Class I Risk 3.9%  BRIGETTE - 2 points low Risk for VENTURA   NSQIP - Predicted length of stay 0 days  ARISCAT - 3 points Low Risk 1.6%  DASI 42.7 Points 7.99 Mets  MARICHUY - 0.2%  JHFRAT - 12 points low risk for falls  Clearance - not indicated  PAT Testing - CBC, BMP, UA    Face to Face patient contact time 30 minutes    DAYRON Romano-CNP 3/20/2024 8:32 AM    Results for orders placed or performed in visit on 03/20/24 (from the past 24 hour(s))   Renal Function Panel   Result Value Ref Range    Glucose 97 74 - 99 mg/dL    Sodium 138 136 - 145 mmol/L    Potassium 4.2 3.5 - 5.3 mmol/L    Chloride 106 98 - 107 mmol/L    Bicarbonate 22 21 - 32 mmol/L    Anion Gap 14 10 - 20 mmol/L    Urea Nitrogen 15 6 - 23 mg/dL    Creatinine 1.09 (H) 0.50 - 1.05 mg/dL    eGFR 59 (L) >60 mL/min/1.73m*2    Calcium 9.6 8.6 - 10.3 mg/dL    Phosphorus 3.8 2.5 - 4.9 mg/dL    Albumin 3.9 3.4 - 5.0 g/dL   Urinalysis with Reflex Culture and Microscopic   Result Value Ref Range    Color, Urine Red-brown (N) Straw, Yellow    Appearance, Urine Cloudy (N) Clear    Specific Gravity, Urine 1.015 1.005  - 1.035    pH, Urine 6.0 5.0, 5.5, 6.0, 6.5, 7.0, 7.5, 8.0    Protein, Urine 100 (2+) (A) NEGATIVE, TRACE mg/dL    Glucose, Urine NEGATIVE NEGATIVE mg/dL    Blood, Urine LARGE (3+) (A) NEGATIVE    Ketones, Urine NEGATIVE NEGATIVE mg/dL    Bilirubin, Urine NEGATIVE NEGATIVE    Urobilinogen, Urine 0.2 0.2, 1.0 mg/dL    Nitrite, Urine POSITIVE (A) NEGATIVE    Leukocyte Esterase, Urine MODERATE (2+) (A) NEGATIVE   Microscopic Only, Urine   Result Value Ref Range    WBC, Urine >50 (A) 1-5, NONE /HPF    WBC Clumps, Urine MODERATE Reference range not established. /HPF    RBC, Urine >20 (A) NONE, 1-2, 3-5 /HPF    Bacteria, Urine 1+ (A) NONE SEEN /HPF   CBC and Auto Differential   Result Value Ref Range    WBC 5.8 4.4 - 11.3 x10*3/uL    nRBC      RBC 4.46 4.00 - 5.20 x10*6/uL    Hemoglobin 10.9 (L) 12.0 - 16.0 g/dL    Hematocrit 35.3 (L) 36.0 - 46.0 %    MCV 79 (L) 80 - 100 fL    MCH 24.4 (L) 26.0 - 34.0 pg    MCHC 30.9 (L) 32.0 - 36.0 g/dL    RDW 15.9 (H) 11.5 - 14.5 %    Platelets 354 150 - 450 x10*3/uL    Neutrophils % 58.7 40.0 - 80.0 %    Immature Granulocytes %, Automated 0.3 0.0 - 0.9 %    Lymphocytes % 27.8 13.0 - 44.0 %    Monocytes % 10.5 2.0 - 10.0 %    Eosinophils % 2.2 0.0 - 6.0 %    Basophils % 0.5 0.0 - 2.0 %    Neutrophils Absolute 3.41 1.20 - 7.70 x10*3/uL    Immature Granulocytes Absolute, Automated 0.02 0.00 - 0.70 x10*3/uL    Lymphocytes Absolute 1.62 1.20 - 4.80 x10*3/uL    Monocytes Absolute 0.61 0.10 - 1.00 x10*3/uL    Eosinophils Absolute 0.13 0.00 - 0.70 x10*3/uL    Basophils Absolute 0.03 0.00 - 0.10 x10*3/uL   Vidhya constantino/ Dr. Carbajal's office notified of positive urine culture

## 2024-03-20 NOTE — PREPROCEDURE INSTRUCTIONS
Medication List            Accurate as of March 20, 2024  8:20 AM. Always use your most recent med list.                acetaminophen 500 mg tablet  Commonly known as: Tylenol  Medication Adjustments for Surgery: Other (Comment)  Notes to patient: As needed     acyclovir 400 mg tablet  Commonly known as: Zovirax  Medication Adjustments for Surgery: Take morning of surgery with sip of water, no other fluids     alfuzosin 10 mg 24 hr tablet  Commonly known as: Uroxatral  Medication Adjustments for Surgery: Continue until night before surgery     amLODIPine 2.5 mg tablet  Commonly known as: Norvasc  Take 1 tablet (2.5 mg) by mouth once daily.  Medication Adjustments for Surgery: Take morning of surgery with sip of water, no other fluids     Caltrate with Vitamin D3 600 mg-20 mcg (800 unit) tablet  Generic drug: calcium carbonate-vitamin D3  Medication Adjustments for Surgery: Stop 7 days before surgery     cholecalciferol 50 mcg (2,000 unit) capsule  Commonly known as: Vitamin D-3  Medication Adjustments for Surgery: Stop 7 days before surgery     cyclobenzaprine 10 mg tablet  Commonly known as: Flexeril  Take 0.5 tablets (5 mg) by mouth 3 times a day for 10 days.  Medication Adjustments for Surgery: Continue until night before surgery     docusate sodium 100 mg capsule  Commonly known as: Colace  Medication Adjustments for Surgery: Continue until night before surgery     ferrous gluconate 324 (38 Fe) mg tablet  Commonly known as: Fergon  Take 1 tablet (38 mg of iron) by mouth once daily with breakfast.  Medication Adjustments for Surgery: Continue until night before surgery     fluticasone 50 mcg/actuation nasal spray  Commonly known as: Flonase  Medication Adjustments for Surgery: Other (Comment)  Notes to patient: As needed     multivitamin with minerals tablet  Medication Adjustments for Surgery: Stop 7 days before surgery     polyethylene glycol 17 gram/dose powder  Commonly known as: Glycolax,  Miralax  Medication Adjustments for Surgery: Stop 1 day before surgery     Prolia 60 mg/mL syringe  Generic drug: denosumab  Physician's office to inject 60mg (1 prefilled syringe) beneath the skin once every 6 months. For office use only.  Medication Adjustments for Surgery: Other (Comment)  Notes to patient: Per prescriber                              NPO Instructions:    Do not eat any food after midnight the night before your surgery/procedure.    Additional Instructions:     Seven/Six Days before Surgery:  Review your medication instructions, stop indicated medications  Five Days before Surgery:  Review your medication instructions, stop indicated medications  Three Days before Surgery:  Review your medication instructions, stop indicated medications  The Day before Surgery:  Review your medication instructions, stop indicated medications  You will be contacted regarding the time of your arrival to facility and surgery time  Do not eat any food after Midnight  Day of Surgery:  Review your medication instructions, take indicated medications  Wear  comfortable loose fitting clothing  Do not use moisturizers, creams, lotions or perfume  All jewelry and valuables should be left at home  PAT DISCHARGE INSTRUCTIONS    Please call the Same Day Surgery (SDS) Department of the hospital where your procedure will be performed between 2:00- 3:30 PM the day before your surgery. If you are scheduled on a Monday, or a Tuesday following a Monday holiday, you will need to call on the last business day prior to your surgery.    [facility]  [address] [place] [address]  [phone]    Please let your surgeon know if:      You develop any open sores, shingles, burning or painful urination as these may increase your risk of an infection.   You no longer wish to have the surgery.   Any other personal circumstances change that may lead to the need to cancel or defer this surgery-such as  being sick or getting admitted to any hospital within one week of your planned procedure.    Your contact details change, such as a change of address or phone number.    Starting now:     Please DO NOT drink alcohol or smoke for 24 hours before surgery. It is well known that quitting smoking can make a huge difference to your health and recovery from surgery. The longer you abstain from smoking, the better your chances of a healthy recovery. If you need help with quitting, call 6-554-QUIT-NOW to be connected to a trained counselor who will discuss the best methods to help you quit.     Before your surgery:    Please stop all supplements 7 days prior to surgery. Or as directed by your surgeon.   Please stop taking NSAID pain medicine such as Advil and Motrin 7 days before surgery.    If you develop any fever, cough, cold, rashes, cuts, scratches, scrapes, urinary symptoms or infection anywhere on your body (including teeth and gums) prior to surgery, please call your surgeon’s office as soon as possible. This may require treatment to reduce the chance of cancellation on the day of surgery.    The day before your surgery:   DIET- Do not eat any food after MIDNIGHT.   Get a good night’s rest.  Use the special soap for bathing if you have been instructed to use one.    Scheduled surgery times may change and you will be notified if this occurs - please check your personal voicemail for any updates.     On the morning of surgery:   Wear comfortable, loose fitting clothes which open in the front. Please do not wear moisturizers, creams, lotions, makeup or perfume.    Please bring with you to surgery:   Photo ID and insurance card   Current list of medicines and allergies   Pacemaker/ Defibrillator/Heart stent cards   CPAP machine and mask    Slings/ splints/ crutches   A copy of your complete advanced directive/DHPOA.    Please do NOT bring with you to surgery:   All jewelry and valuables should be left at home.    Prosthetic devices such as contact lenses, hearing aids, dentures, eyelash extensions, hairpins and body piercings must be removed prior to going in to the surgical suite.    After outpatient surgery:   A responsible adult MUST accompany you at the time of discharge and stay with you for 24 hours after your surgery. You may NOT drive yourself home after surgery.    Do not drive, operate machinery, make critical decisions or do activities that require co-ordination or balance until after a night’s sleep.   Do not drink alcoholic beverages for 24 hours.   Instructions for resuming your medications will be provided by your surgeon.    CALL YOUR DOCTOR AFTER SURGERY IF YOU HAVE:     Chills and/or a fever of 101° F or higher.    Redness, swelling, pus or drainage from your surgical wound or a bad smell from the wound.    Lightheadedness, fainting or confusion.    Persistent vomiting (throwing up) and are not able to eat or drink for 12 hours.    Three or more loose, watery bowel movements in 24 hours (diarrhea).   Difficulty or pain while urinating( after non-urological surgery)    Pain and swelling in your legs, especially if it is only on one side.    Difficulty breathing or are breathing faster than normal.    Any new concerning symptoms.      Reviewed pre-op instructions with patient, states understanding and denies further questions at this time.    If you have not received a call regarding your arrival time for surgery by 2pm on the day before surgery, you can call [phone].    Take Care

## 2024-03-20 NOTE — H&P (VIEW-ONLY)
CPM/PAT Evaluation   Laurence Campbell is a 58 y.o. female   Chief Complaint: having a stent placed and old stent removed    HPI:  Patient is a 59 y/o alert and oriented female coming in for PAT for a scheduled cystoscopy w/ insertion and removal stent ureter on 3/28/24 w/ Dr. Carbajal.    The patient reports constant sharp, throbbing left lower back pain.  She states tylenol helps..    Patient denies chest pain, SOB, PEACE and NVDC.    Patient also denies Hx: DVT/PE.    Current medications were reviewed and a presurgical mediation schedule was provided.    She has no questions at this time.   Past Medical History:   Diagnosis Date    Abdominal pain, LLQ (left lower quadrant) 07/18/2023    Acute UTI 07/18/2023    Cellulitis 07/18/2023    Constipation 07/18/2023    Encounter for other preprocedural examination     Preop testing    Hx of hyperparathyroidism     Hx of malignant neoplasm of vagina     Iron deficiency anemia     Left wrist pain 07/18/2023    Neurogenic bladder     Neuropathy     Osteoporosis     Recurrent UTI     Right shoulder pain 07/18/2023    Skin irritation 07/18/2023    Stiffness of right shoulder joint 07/18/2023    Vaginal adhesions       Past Surgical History:   Procedure Laterality Date    OTHER SURGICAL HISTORY  11/01/2017    Nephrectomy Right    PARATHYROID GLAND SURGERY      US GUIDED BIOPSY LYMPH NODE SUPERFICIAL  12/03/2018    US GUIDED BIOPSY LYMPH NODE SUPERFICIAL 12/3/2018 [facility]    VAGINAL MASS EXCISION  03/09/2015    Excision Of Vaginal Cyst Or Tumor        Allergies   Allergen Reactions    Amoxicillin Hives    Penicillins Hives and Itching    Alendronate Sodium Unknown    Alendronic Acid Unknown    Piperacillin-Tazobactam Other    Sulfa (Sulfonamide Antibiotics) Hives and Unknown    Clindamycin Itching, Rash and Hives        Current Outpatient Medications on File Prior to Visit   Medication Sig Dispense Refill    acetaminophen (Tylenol) 500 mg tablet Take 1 tablet (500 mg) by  mouth every 6 hours if needed for mild pain (1 - 3).      acyclovir (Zovirax) 400 mg tablet Take 1 tablet (400 mg) by mouth 2 times a day.      alfuzosin (Uroxatral) 10 mg 24 hr tablet Take 1 tablet (10 mg) by mouth once daily. Do not crush, chew, or split.      amLODIPine (Norvasc) 2.5 mg tablet Take 1 tablet (2.5 mg) by mouth once daily. 30 tablet 5    calcium carbonate-vitamin D3 (Caltrate with Vitamin D3) 600 mg-20 mcg (800 unit) tablet Take 1 tablet by mouth once daily.      cholecalciferol (Vitamin D-3) 50 mcg (2,000 unit) capsule Take 2 capsules (100 mcg) by mouth once daily.      cyclobenzaprine (Flexeril) 10 mg tablet Take 0.5 tablets (5 mg) by mouth 3 times a day for 10 days. 15 tablet 0    docusate sodium (Colace) 100 mg capsule Take by mouth 2 times a day as needed for constipation.      ferrous gluconate (Fergon) 324 (38 Fe) mg tablet Take 1 tablet (38 mg of iron) by mouth once daily with breakfast. 90 tablet 3    fluticasone (Flonase) 50 mcg/actuation nasal spray Administer 1 spray into each nostril once daily.      multivitamin with minerals (multivitamin-iron-folic acid) tablet Take 1 tablet by mouth once daily.      polyethylene glycol (Glycolax, Miralax) 17 gram/dose powder Take 17 g by mouth once daily as needed.      denosumab (Prolia) 60 mg/mL syringe Physician's office to inject 60mg (1 prefilled syringe) beneath the skin once every 6 months. For office use only. 1 mL 2    [DISCONTINUED] acetaminophen (Tylenol) 325 mg tablet Take 2 tablets (650 mg) by mouth every 6 hours if needed for moderate pain (4 - 6). 30 tablet 0    [DISCONTINUED] baclofen (Lioresal) 10 mg tablet Take 0.5-1 tablets (5-10 mg) by mouth 3 times a day as needed. MAY CAUSE DROWSINESS. USE WITH CAUTION.      [DISCONTINUED] calcium carbonate (CALTRATE 600 ORAL) Take by mouth.      [DISCONTINUED] calcium citrate (Calcitrate) 200 mg (950 mg) tablet       [DISCONTINUED] cephalexin (Keftab) 500 mg tablet Take 1 tablet (500 mg) by  mouth 4 times a day.      [DISCONTINUED] ergocalciferol (Vitamin D-2) 1.25 MG (49651 UT) capsule Take 1 capsule (50,000 Units) by mouth once a week.      [DISCONTINUED] ergocalciferol (Vitamin D-2) 1.25 MG (20548 UT) capsule Take 1 capsule (50,000 Units) by mouth 3 times a week.      [DISCONTINUED] ertapenem (INVanz) 1 gram injection       [DISCONTINUED] ketorolac (Toradol) 10 mg tablet Take 1 tablet (10 mg) by mouth every 6 hours if needed for moderate pain (4 - 6) for up to 5 days. (Patient not taking: Reported on 3/18/2024) 20 tablet 0    [DISCONTINUED] lidocaine-prilocaine 4-2.5-2.5 % kit,cream and gel Apply 1 Tube topically 3 times a day as needed.      [DISCONTINUED] naltrexone 4.5 mg capsule Take 1 capsule by mouth once daily.      [DISCONTINUED] nitrofurantoin, macrocrystal-monohydrate, (Macrobid) 100 mg capsule Take 1 capsule (100 mg) by mouth twice a day. Take until gone.      [DISCONTINUED] pot bicarb/potassium cit/ca (POTASSIUM BICARBONATE ORAL) Take by mouth.      [DISCONTINUED] potassium chloride ER (Micro-K) 8 mEq ER capsule TAKE ONE CAPSULE DAILY TOGETHER WITH CHLORTHALIDONE      [DISCONTINUED] Premarin vaginal cream INSERT 0.5 APPLICATOR BEDTIME 0.5 G PER VAGINA 2 NIGHTS A WEEK      [DISCONTINUED] tizanidine HCl (TIZANIDINE ORAL)       [DISCONTINUED] valACYclovir (Valtrex) 500 mg tablet Take 1 tablet (500 mg) by mouth 2 times a day.      [DISCONTINUED] wheat dextrin (Benefiber Clear SF, dextrin,) 3 gram/3.5 gram powder in packet Take by mouth once daily.       No current facility-administered medications on file prior to visit.       Vitals:    03/20/24 0821   BP: (!) 139/95   Pulse: 91   Resp: 16   Temp: 36.1 °C (97 °F)   SpO2: 100%       Review of Systems   Constitutional: Negative.    HENT: Negative.     Eyes: Negative.    Respiratory: Negative.     Cardiovascular:         Hypertension   Gastrointestinal: Negative.    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    Allergic/Immunologic: Negative.    Neurological:         Neuropathy   Hematological:         Iron deficiency anemia    Psychiatric/Behavioral: Negative.        Physical Exam  Vitals and nursing note reviewed.   Constitutional:       Appearance: Normal appearance.   HENT:      Head: Normocephalic and atraumatic.      Mouth/Throat:      Mouth: Mucous membranes are moist.      Pharynx: Oropharynx is clear.   Eyes:      Pupils: Pupils are equal, round, and reactive to light.   Cardiovascular:      Rate and Rhythm: Normal rate and regular rhythm.      Heart sounds: Normal heart sounds.      Comments: EKG on 11/29/2023 shows NSR rate of 76  Pulmonary:      Effort: Pulmonary effort is normal.      Breath sounds: Normal breath sounds.   Abdominal:      General: Bowel sounds are normal.      Palpations: Abdomen is soft.   Musculoskeletal:         General: Normal range of motion.      Cervical back: Normal range of motion and neck supple.   Skin:     General: Skin is warm and dry.   Neurological:      General: No focal deficit present.      Mental Status: She is alert and oriented to person, place, and time.   Psychiatric:         Mood and Affect: Mood normal.         Behavior: Behavior normal.         Thought Content: Thought content normal.         Judgment: Judgment normal.        PAT AIRWAY:   Airway:     Mallampati::  IV    TM distance::  >3 FB    Neck ROM::  Full  Has permanent upper bridge  Does not smoke  Social alcohol use  No drug use  No issues with anesthesia  No family issues with anesthesia    Assessment and Plan:   Hydronephrosis w/ ureteral stricture  Cystoscopy with insertion and removal stent ureter  Managed with alfuzosin 10mg daily    Hx:  Malignant Neoplasm Vagina  S/p chemo & radiation 2004  S/p right nephrectomy    Radiation Cystitis / Proctitis  Hyperbaric therapy  Vaginal adhesions    Neurogenic Bladder / Recurrent UTI's  Antibiotic therapy as needed  Follow up as needed with infectious disease  Follow up with urology     Neuropathy  Managed with baclofen 10mg 1/2 -1 tablet tid prn    Hypertension  Managed with amlodipine 2.5mg daily  Last EKG 11/29/2023 NSR rate of 76  Denies headaches, dizziness, lightheadedness, chest pain, pressure or palpitations    Osteoporosis  Managed with prolia 60 mg every 6 months  Follow up with endocrinology as scheduled    Hyperparathyroid  S/p parathyroidectomy  Last TSH was 0.99 on 12/19/2023    Iron Deficiency Anemia  Managed with ferrous gluconate 324mg daily  Last iron level 13 on 12/19/2023  H&H 10.3 & 33.4 on 12/19/2023    ASA III  RCRI - 0 points  Class I Risk 3.9%  BRIGETTE - 2 points low Risk for VENTURA   NSQIP - Predicted length of stay 0 days  ARISCAT - 3 points Low Risk 1.6%  DASI 42.7 Points 7.99 Mets  MARICHUY - 0.2%  JHFRAT - 12 points low risk for falls  Clearance - not indicated  PAT Testing - CBC, BMP, UA    Face to Face patient contact time 30 minutes    DAYRON Romano-CNP 3/20/2024 8:32 AM    Results for orders placed or performed in visit on 03/20/24 (from the past 24 hour(s))   Renal Function Panel   Result Value Ref Range    Glucose 97 74 - 99 mg/dL    Sodium 138 136 - 145 mmol/L    Potassium 4.2 3.5 - 5.3 mmol/L    Chloride 106 98 - 107 mmol/L    Bicarbonate 22 21 - 32 mmol/L    Anion Gap 14 10 - 20 mmol/L    Urea Nitrogen 15 6 - 23 mg/dL    Creatinine 1.09 (H) 0.50 - 1.05 mg/dL    eGFR 59 (L) >60 mL/min/1.73m*2    Calcium 9.6 8.6 - 10.3 mg/dL    Phosphorus 3.8 2.5 - 4.9 mg/dL    Albumin 3.9 3.4 - 5.0 g/dL   Urinalysis with Reflex Culture and Microscopic   Result Value Ref Range    Color, Urine Red-brown (N) Straw, Yellow    Appearance, Urine Cloudy (N) Clear    Specific Gravity, Urine 1.015 1.005  - 1.035    pH, Urine 6.0 5.0, 5.5, 6.0, 6.5, 7.0, 7.5, 8.0    Protein, Urine 100 (2+) (A) NEGATIVE, TRACE mg/dL    Glucose, Urine NEGATIVE NEGATIVE mg/dL    Blood, Urine LARGE (3+) (A) NEGATIVE    Ketones, Urine NEGATIVE NEGATIVE mg/dL    Bilirubin, Urine NEGATIVE NEGATIVE    Urobilinogen, Urine 0.2 0.2, 1.0 mg/dL    Nitrite, Urine POSITIVE (A) NEGATIVE    Leukocyte Esterase, Urine MODERATE (2+) (A) NEGATIVE   Microscopic Only, Urine   Result Value Ref Range    WBC, Urine >50 (A) 1-5, NONE /HPF    WBC Clumps, Urine MODERATE Reference range not established. /HPF    RBC, Urine >20 (A) NONE, 1-2, 3-5 /HPF    Bacteria, Urine 1+ (A) NONE SEEN /HPF   CBC and Auto Differential   Result Value Ref Range    WBC 5.8 4.4 - 11.3 x10*3/uL    nRBC      RBC 4.46 4.00 - 5.20 x10*6/uL    Hemoglobin 10.9 (L) 12.0 - 16.0 g/dL    Hematocrit 35.3 (L) 36.0 - 46.0 %    MCV 79 (L) 80 - 100 fL    MCH 24.4 (L) 26.0 - 34.0 pg    MCHC 30.9 (L) 32.0 - 36.0 g/dL    RDW 15.9 (H) 11.5 - 14.5 %    Platelets 354 150 - 450 x10*3/uL    Neutrophils % 58.7 40.0 - 80.0 %    Immature Granulocytes %, Automated 0.3 0.0 - 0.9 %    Lymphocytes % 27.8 13.0 - 44.0 %    Monocytes % 10.5 2.0 - 10.0 %    Eosinophils % 2.2 0.0 - 6.0 %    Basophils % 0.5 0.0 - 2.0 %    Neutrophils Absolute 3.41 1.20 - 7.70 x10*3/uL    Immature Granulocytes Absolute, Automated 0.02 0.00 - 0.70 x10*3/uL    Lymphocytes Absolute 1.62 1.20 - 4.80 x10*3/uL    Monocytes Absolute 0.61 0.10 - 1.00 x10*3/uL    Eosinophils Absolute 0.13 0.00 - 0.70 x10*3/uL    Basophils Absolute 0.03 0.00 - 0.10 x10*3/uL   Vidhya constantino/ Dr. Carbajal's office notified of positive urine culture

## 2024-03-21 DIAGNOSIS — N13.1 HYDRONEPHROSIS CONCURRENT WITH AND DUE TO URETERAL STRICTURE: Primary | ICD-10-CM

## 2024-03-23 LAB — BACTERIA UR CULT: ABNORMAL

## 2024-03-27 ENCOUNTER — ANESTHESIA EVENT (OUTPATIENT)
Dept: OPERATING ROOM | Facility: HOSPITAL | Age: 59
End: 2024-03-27
Payer: MEDICARE

## 2024-03-28 ENCOUNTER — HOSPITAL ENCOUNTER (OUTPATIENT)
Facility: HOSPITAL | Age: 59
Setting detail: OUTPATIENT SURGERY
Discharge: HOME | End: 2024-03-28
Attending: UROLOGY | Admitting: UROLOGY
Payer: MEDICARE

## 2024-03-28 ENCOUNTER — ANESTHESIA (OUTPATIENT)
Dept: OPERATING ROOM | Facility: HOSPITAL | Age: 59
End: 2024-03-28
Payer: MEDICARE

## 2024-03-28 ENCOUNTER — APPOINTMENT (OUTPATIENT)
Dept: RADIOLOGY | Facility: HOSPITAL | Age: 59
End: 2024-03-28
Payer: MEDICARE

## 2024-03-28 VITALS
TEMPERATURE: 96.8 F | OXYGEN SATURATION: 100 % | HEIGHT: 67 IN | SYSTOLIC BLOOD PRESSURE: 130 MMHG | WEIGHT: 172.4 LBS | BODY MASS INDEX: 27.06 KG/M2 | DIASTOLIC BLOOD PRESSURE: 84 MMHG | HEART RATE: 74 BPM | RESPIRATION RATE: 15 BRPM

## 2024-03-28 PROCEDURE — 7100000002 HC RECOVERY ROOM TIME - EACH INCREMENTAL 1 MINUTE: Performed by: UROLOGY

## 2024-03-28 PROCEDURE — C1769 GUIDE WIRE: HCPCS | Performed by: UROLOGY

## 2024-03-28 PROCEDURE — 2500000005 HC RX 250 GENERAL PHARMACY W/O HCPCS: Performed by: STUDENT IN AN ORGANIZED HEALTH CARE EDUCATION/TRAINING PROGRAM

## 2024-03-28 PROCEDURE — 3600000008 HC OR TIME - EACH INCREMENTAL 1 MINUTE - PROCEDURE LEVEL THREE: Performed by: UROLOGY

## 2024-03-28 PROCEDURE — 2500000004 HC RX 250 GENERAL PHARMACY W/ HCPCS (ALT 636 FOR OP/ED): Performed by: UROLOGY

## 2024-03-28 PROCEDURE — 3700000002 HC GENERAL ANESTHESIA TIME - EACH INCREMENTAL 1 MINUTE: Performed by: UROLOGY

## 2024-03-28 PROCEDURE — 7100000001 HC RECOVERY ROOM TIME - INITIAL BASE CHARGE: Performed by: UROLOGY

## 2024-03-28 PROCEDURE — 2780000003 HC OR 278 NO HCPCS: Performed by: UROLOGY

## 2024-03-28 PROCEDURE — 3700000001 HC GENERAL ANESTHESIA TIME - INITIAL BASE CHARGE: Performed by: UROLOGY

## 2024-03-28 PROCEDURE — 3600000003 HC OR TIME - INITIAL BASE CHARGE - PROCEDURE LEVEL THREE: Performed by: UROLOGY

## 2024-03-28 PROCEDURE — 2500000004 HC RX 250 GENERAL PHARMACY W/ HCPCS (ALT 636 FOR OP/ED): Performed by: STUDENT IN AN ORGANIZED HEALTH CARE EDUCATION/TRAINING PROGRAM

## 2024-03-28 PROCEDURE — 7100000010 HC PHASE TWO TIME - EACH INCREMENTAL 1 MINUTE: Performed by: UROLOGY

## 2024-03-28 PROCEDURE — C2617 STENT, NON-COR, TEM W/O DEL: HCPCS | Performed by: UROLOGY

## 2024-03-28 PROCEDURE — 2720000007 HC OR 272 NO HCPCS: Performed by: UROLOGY

## 2024-03-28 PROCEDURE — 7100000009 HC PHASE TWO TIME - INITIAL BASE CHARGE: Performed by: UROLOGY

## 2024-03-28 DEVICE — INLAY OPTIMA URETERAL STENT W/O GUIDEWIRE
Type: IMPLANTABLE DEVICE | Site: URETER | Status: FUNCTIONAL
Brand: BARD® INLAY OPTIMA® URETERAL STENT

## 2024-03-28 RX ORDER — SODIUM CHLORIDE, SODIUM LACTATE, POTASSIUM CHLORIDE, CALCIUM CHLORIDE 600; 310; 30; 20 MG/100ML; MG/100ML; MG/100ML; MG/100ML
100 INJECTION, SOLUTION INTRAVENOUS CONTINUOUS
Status: DISCONTINUED | OUTPATIENT
Start: 2024-03-28 | End: 2024-03-28 | Stop reason: HOSPADM

## 2024-03-28 RX ORDER — HYDRALAZINE HYDROCHLORIDE 20 MG/ML
5 INJECTION INTRAMUSCULAR; INTRAVENOUS EVERY 30 MIN PRN
Status: DISCONTINUED | OUTPATIENT
Start: 2024-03-28 | End: 2024-03-28 | Stop reason: HOSPADM

## 2024-03-28 RX ORDER — FENTANYL CITRATE 50 UG/ML
50 INJECTION, SOLUTION INTRAMUSCULAR; INTRAVENOUS EVERY 5 MIN PRN
Status: DISCONTINUED | OUTPATIENT
Start: 2024-03-28 | End: 2024-03-28 | Stop reason: HOSPADM

## 2024-03-28 RX ORDER — LIDOCAINE HYDROCHLORIDE 10 MG/ML
INJECTION, SOLUTION EPIDURAL; INFILTRATION; INTRACAUDAL; PERINEURAL AS NEEDED
Status: DISCONTINUED | OUTPATIENT
Start: 2024-03-28 | End: 2024-03-28

## 2024-03-28 RX ORDER — MIDAZOLAM HYDROCHLORIDE 1 MG/ML
INJECTION, SOLUTION INTRAMUSCULAR; INTRAVENOUS AS NEEDED
Status: DISCONTINUED | OUTPATIENT
Start: 2024-03-28 | End: 2024-03-28

## 2024-03-28 RX ORDER — PROCHLORPERAZINE EDISYLATE 5 MG/ML
5 INJECTION INTRAMUSCULAR; INTRAVENOUS ONCE AS NEEDED
Status: DISCONTINUED | OUTPATIENT
Start: 2024-03-28 | End: 2024-03-28 | Stop reason: HOSPADM

## 2024-03-28 RX ORDER — IPRATROPIUM BROMIDE 0.5 MG/2.5ML
500 SOLUTION RESPIRATORY (INHALATION) AS NEEDED
Status: DISCONTINUED | OUTPATIENT
Start: 2024-03-28 | End: 2024-03-28 | Stop reason: HOSPADM

## 2024-03-28 RX ORDER — FENTANYL CITRATE 50 UG/ML
INJECTION, SOLUTION INTRAMUSCULAR; INTRAVENOUS AS NEEDED
Status: DISCONTINUED | OUTPATIENT
Start: 2024-03-28 | End: 2024-03-28

## 2024-03-28 RX ORDER — ALBUTEROL SULFATE 0.83 MG/ML
2.5 SOLUTION RESPIRATORY (INHALATION) ONCE AS NEEDED
Status: DISCONTINUED | OUTPATIENT
Start: 2024-03-28 | End: 2024-03-28 | Stop reason: HOSPADM

## 2024-03-28 RX ORDER — ONDANSETRON HYDROCHLORIDE 2 MG/ML
INJECTION, SOLUTION INTRAVENOUS AS NEEDED
Status: DISCONTINUED | OUTPATIENT
Start: 2024-03-28 | End: 2024-03-28

## 2024-03-28 RX ORDER — PROPOFOL 10 MG/ML
INJECTION, EMULSION INTRAVENOUS AS NEEDED
Status: DISCONTINUED | OUTPATIENT
Start: 2024-03-28 | End: 2024-03-28

## 2024-03-28 RX ORDER — LABETALOL HYDROCHLORIDE 5 MG/ML
5 INJECTION, SOLUTION INTRAVENOUS ONCE AS NEEDED
Status: DISCONTINUED | OUTPATIENT
Start: 2024-03-28 | End: 2024-03-28 | Stop reason: HOSPADM

## 2024-03-28 RX ORDER — MEPERIDINE HYDROCHLORIDE 25 MG/ML
12.5 INJECTION INTRAMUSCULAR; INTRAVENOUS; SUBCUTANEOUS EVERY 10 MIN PRN
Status: DISCONTINUED | OUTPATIENT
Start: 2024-03-28 | End: 2024-03-28 | Stop reason: HOSPADM

## 2024-03-28 RX ORDER — ONDANSETRON HYDROCHLORIDE 2 MG/ML
4 INJECTION, SOLUTION INTRAVENOUS ONCE AS NEEDED
Status: DISCONTINUED | OUTPATIENT
Start: 2024-03-28 | End: 2024-03-28 | Stop reason: HOSPADM

## 2024-03-28 RX ORDER — FENTANYL CITRATE 50 UG/ML
25 INJECTION, SOLUTION INTRAMUSCULAR; INTRAVENOUS EVERY 5 MIN PRN
Status: DISCONTINUED | OUTPATIENT
Start: 2024-03-28 | End: 2024-03-28 | Stop reason: HOSPADM

## 2024-03-28 RX ORDER — DIPHENHYDRAMINE HYDROCHLORIDE 50 MG/ML
12.5 INJECTION INTRAMUSCULAR; INTRAVENOUS ONCE AS NEEDED
Status: DISCONTINUED | OUTPATIENT
Start: 2024-03-28 | End: 2024-03-28 | Stop reason: HOSPADM

## 2024-03-28 RX ADMIN — PROPOFOL 200 MG: 10 INJECTION, EMULSION INTRAVENOUS at 11:13

## 2024-03-28 RX ADMIN — MIDAZOLAM 2 MG: 1 INJECTION INTRAMUSCULAR; INTRAVENOUS at 11:09

## 2024-03-28 RX ADMIN — ONDANSETRON 4 MG: 2 INJECTION INTRAMUSCULAR; INTRAVENOUS at 11:23

## 2024-03-28 RX ADMIN — DEXAMETHASONE SODIUM PHOSPHATE 4 MG: 4 INJECTION, SOLUTION INTRAMUSCULAR; INTRAVENOUS at 11:23

## 2024-03-28 RX ADMIN — SODIUM CHLORIDE, SODIUM LACTATE, POTASSIUM CHLORIDE, AND CALCIUM CHLORIDE 100 ML/HR: 600; 310; 30; 20 INJECTION, SOLUTION INTRAVENOUS at 10:18

## 2024-03-28 RX ADMIN — FENTANYL CITRATE 25 MCG: 0.05 INJECTION, SOLUTION INTRAMUSCULAR; INTRAVENOUS at 11:23

## 2024-03-28 RX ADMIN — LIDOCAINE HYDROCHLORIDE 5 ML: 10 INJECTION, SOLUTION EPIDURAL; INFILTRATION; INTRACAUDAL; PERINEURAL at 11:13

## 2024-03-28 RX ADMIN — DEXTROSE MONOHYDRATE 390 MG: 50 INJECTION, SOLUTION INTRAVENOUS at 10:45

## 2024-03-28 SDOH — HEALTH STABILITY: MENTAL HEALTH: CURRENT SMOKER: 0

## 2024-03-28 ASSESSMENT — PAIN - FUNCTIONAL ASSESSMENT
PAIN_FUNCTIONAL_ASSESSMENT: 0-10
PAIN_FUNCTIONAL_ASSESSMENT: WONG-BAKER FACES
PAIN_FUNCTIONAL_ASSESSMENT: 0-10

## 2024-03-28 ASSESSMENT — PAIN SCALES - GENERAL
PAINLEVEL_OUTOF10: 0 - NO PAIN

## 2024-03-28 ASSESSMENT — COLUMBIA-SUICIDE SEVERITY RATING SCALE - C-SSRS
1. IN THE PAST MONTH, HAVE YOU WISHED YOU WERE DEAD OR WISHED YOU COULD GO TO SLEEP AND NOT WAKE UP?: NO
2. HAVE YOU ACTUALLY HAD ANY THOUGHTS OF KILLING YOURSELF?: NO

## 2024-03-28 NOTE — OP NOTE
Cystoscopy with Insertion Stent Ureter (L), Cystoscopy with Removal Stent Ureter (L) Operative Note     Date: 3/28/2024  OR Location: [facility]    Name: Laurence Campbell, : 1965, Age: 58 y.o., MRN: [id number], Sex: female    Diagnosis  Pre-op Diagnosis     * Hydronephrosis with ureteral stricture, not elsewhere classified [N13.1] Post-op Diagnosis     * Hydronephrosis with ureteral stricture, not elsewhere classified [N13.1]     Procedures  Cystoscopy with Insertion Stent Ureter  87132 - VA CYSTO W/INSERT URETERAL STENT    Cystoscopy with Removal Stent Ureter  96689 - VA CYSTO W/SIMPLE REMOVAL STONE & STENT      Surgeons      * Akil Carbajal - Primary    Resident/Fellow/Other Assistant:  Surgeon(s) and Role:    Procedure Summary  Anesthesia: General  ASA: II  Anesthesia Staff: Anesthesiologist: Spencer Borrego MD  Estimated Blood Loss: 0 mL  Intra-op Medications:   Administrations occurring from 1030 to 1125 on 24:   Medication Name Total Dose   gentamicin (Garamycin) 390 mg in dextrose 5 % in water (D5W) 100 mL IV 66.67 mL              Anesthesia Record               Intraprocedure I/O Totals       None           Specimen: No specimens collected     Staff:   Circulator: Sarah Cisneros RN  Scrub Person: Kingston Reynolds         Drains and/or Catheters: * None in log *    Tourniquet Times:         Implants:     Findings: No significant abnormalities    Indications: Laurence Campbell is an 58 y.o. female who is having surgery for LT HYDRONEPHROSIS N13.30.     The patient was seen in the preoperative area. The risks, benefits, complications, treatment options, non-operative alternatives, expected recovery and outcomes were discussed with the patient. The possibilities of reaction to medication, pulmonary aspiration, injury to surrounding structures, bleeding, recurrent infection, the need for additional procedures, failure to diagnose a condition, and creating a complication requiring transfusion or  operation were discussed with the patient. The patient concurred with the proposed plan, giving informed consent.  The site of surgery was properly noted/marked if necessary per policy. The patient has been actively warmed in preoperative area. Preoperative antibiotics have been ordered and given within 1 hours of incision. Venous thrombosis prophylaxis are not indicated.    Procedure Details: Patient was taken the operating room placed under general anesthesia.  She is then placed in the dorsolithotomy position and prepped and draped in sterile manner.  The patient underwent cystoscopy.  The stent was grasped and brought out through the urethral meatus.  A guidewire was placed up the stent into the kidney and the stent was removed.  A 6 Pashto 26 cm double-J stent was placed with a good coil in the kidney and a good coil in the bladder.  The bladder was drained.  The patient was awakened and taken to the recovery room in stable condition.  Complications:  None; patient tolerated the procedure well.    Disposition: PACU - hemodynamically stable.  Condition: stable         Additional Details:       Attending Attestation: I performed the procedure.    Akil Carbajal  Phone Number: [phone]

## 2024-03-28 NOTE — ANESTHESIA POSTPROCEDURE EVALUATION
Patient: Laurence Campbell    Procedure Summary       Date: 03/28/24 Room / Location: [facility] 01 / Virtual [facility]    Anesthesia Start: 1109 Anesthesia Stop: 1140    Procedures:       Cystoscopy with Insertion Stent Ureter (Left)      Cystoscopy with Removal Stent Ureter (Left) Diagnosis:       Hydronephrosis with ureteral stricture, not elsewhere classified      (LT HYDRONEPHROSIS N13.30)    Surgeons: Akil Carbajal MD Responsible Provider: Spencer Borrego MD    Anesthesia Type: general ASA Status: 2            Anesthesia Type: general    Vitals Value Taken Time   /84 03/28/24 1150   Temp 35.9 °C (96.6 °F) 03/28/24 1135   Pulse 72 03/28/24 1150   Resp 13 03/28/24 1150   SpO2 98 % 03/28/24 1150       Anesthesia Post Evaluation    Patient location during evaluation: PACU  Patient participation: complete - patient participated  Level of consciousness: awake  Pain management: adequate  Multimodal analgesia pain management approach  Airway patency: patent  Cardiovascular status: acceptable and hemodynamically stable  Respiratory status: acceptable and spontaneous ventilation  Hydration status: euvolemic  Postoperative Nausea and Vomiting: none  Comments: No nausea or vomiting        There were no known notable events for this encounter.

## 2024-03-28 NOTE — ANESTHESIA PREPROCEDURE EVALUATION
Patient: Laurence Campbell    Procedure Information       Date/Time: 03/28/24 1030    Procedures:       Cystoscopy with Insertion Stent Ureter (Left)      Cystoscopy with Removal Stent Ureter (Left)    Location: [facility] / [facility]    Surgeons: Akil Carbajal MD            Relevant Problems   Anesthesia (within normal limits)      Cardiac   (+) Benign essential HTN   (+) Hypertension   (-) History of coronary artery bypass graft   (-) Pacemaker      Pulmonary   (-) Asthma   (-) Chronic obstructive pulmonary disease (CMS/HCC)   (-) VENTURA (obstructive sleep apnea)      Neuro   (+) Cervical neuritis   (+) Disorder of left cervical nerve root   (+) Intervertebral disc disorder with radiculopathy of lumbar region   (+) Lumbar neuritis   (-) CVA (cerebral vascular accident) (CMS/HCC)   (-) Seizures (CMS/HCC)      /Renal   (+) Recurrent UTI      Endocrine   (+) Obesity   (+) Primary hyperparathyroidism (CMS/HCC)   (-) Diabetes mellitus, type 2 (CMS/HCC)      Hematology   (+) Iron deficiency anemia   (+) Severe anemia   (-) Coagulopathy (CMS/HCC)      Musculoskeletal   (+) Intervertebral disc disorder with radiculopathy of lumbar region      ID   (+) HSV infection   (+) Recurrent UTI   (+) Tinea pedis      Skin   (+) Rash/skin eruption       Clinical information reviewed:   Tobacco  Allergies  Meds  Problems  Med Hx  Surg Hx  OB Status    Fam Hx  Soc Hx        NPO Detail:  NPO/Void Status  Date of Last Liquid: 03/28/24  Time of Last Liquid: 0730  Date of Last Solid: 03/27/24  Time of Last Solid: 2254  Time of Last Void: 0800         Physical Exam    Airway  Mallampati: I  TM distance: >3 FB  Neck ROM: full     Cardiovascular    Dental    Pulmonary    Abdominal            Anesthesia Plan    History of general anesthesia?: yes  History of complications of general anesthesia?: no    ASA 2     general     The patient is not a current smoker.    intravenous induction   Postoperative administration of opioids  is intended.  Anesthetic plan and risks discussed with patient.  Use of blood products discussed with patient who consented to blood products.

## 2024-03-28 NOTE — DISCHARGE INSTRUCTIONS
In case of emergency call the office at [phone].    If unable to reach the office call [phone].    Because you have had anesthesia you should not drive nor engage in any heavy activity for 24 hours.    If there are any signs of infection such as fever >101.5, redness or pus from an incision, or unexpected burning with urination, contact the doctor at [phone].    If you have pain beyond what is expected for the procedure call the doctor at [phone]

## 2024-03-28 NOTE — ANESTHESIA PROCEDURE NOTES
Airway  Date/Time: 3/28/2024 11:15 AM  Urgency: elective    Airway not difficult    Staffing  Performed: attending   Authorized by: Spencer Borrego MD    Performed by: Spencer Borrego MD  Patient location during procedure: OR    Indications and Patient Condition  Indications for airway management: anesthesia  Spontaneous ventilation: present  Sedation level: deep  Preoxygenated: yes  Mask difficulty assessment: 0 - not attempted    Final Airway Details  Final airway type: supraglottic airway      Successful airway: classic  Size 4     Number of attempts at approach: 1

## 2024-03-28 NOTE — PERIOPERATIVE NURSING NOTE
Pt received from OR via art, montrs on, report received. Assessment completed. LLE shows significant edema ?lymphedema?. Charted. Hx re reviewed.  Abdomen soft, no string, no oozing.

## 2024-03-29 DIAGNOSIS — S39.012A LUMBAR STRAIN, INITIAL ENCOUNTER: ICD-10-CM

## 2024-03-29 RX ORDER — CYCLOBENZAPRINE HCL 10 MG
5 TABLET ORAL 3 TIMES DAILY
Qty: 30 TABLET | Refills: 1 | Status: SHIPPED | OUTPATIENT
Start: 2024-03-29 | End: 2024-05-31 | Stop reason: ALTCHOICE

## 2024-04-01 ENCOUNTER — APPOINTMENT (OUTPATIENT)
Dept: HEMATOLOGY/ONCOLOGY | Facility: CLINIC | Age: 59
End: 2024-04-01
Payer: MEDICARE

## 2024-04-01 ENCOUNTER — TELEPHONE (OUTPATIENT)
Dept: ENDOCRINOLOGY | Facility: CLINIC | Age: 59
End: 2024-04-01
Payer: MEDICARE

## 2024-04-01 PROBLEM — M81.0 AGE-RELATED OSTEOPOROSIS WITHOUT CURRENT PATHOLOGICAL FRACTURE: Status: ACTIVE | Noted: 2024-04-01

## 2024-04-01 NOTE — TELEPHONE ENCOUNTER
"Laurence Campbell   Contacted office stating infusion center needed\"plan\" for her Prolia shot prior to her scheduling . Call placed to Infusion center on [address]  , phone number [phone] to clarify . Message left on Voicemail for infusion center to call back office with details. Clarissa Bajwa LPN    "

## 2024-04-01 NOTE — TELEPHONE ENCOUNTER
Spoke to Alisha at  infusion center on [address]. She Stated that provider needs to place therapy plan in EPIC for patient to be able to schedule her Prolia Shot . Routing to provider. Clarissa Bajwa LPN

## 2024-04-16 ENCOUNTER — TELEPHONE (OUTPATIENT)
Dept: PRIMARY CARE | Facility: CLINIC | Age: 59
End: 2024-04-16
Payer: MEDICARE

## 2024-04-18 NOTE — TELEPHONE ENCOUNTER
Spoke to patient and informed her rmb said to wait one more year since her last one was from 2017.      Lianna Sandhu MA

## 2024-04-25 DIAGNOSIS — M65.4 DE QUERVAIN'S TENOSYNOVITIS: ICD-10-CM

## 2024-05-09 RX ORDER — HYDRALAZINE HYDROCHLORIDE 20 MG/ML
5 INJECTION INTRAMUSCULAR; INTRAVENOUS EVERY 30 MIN PRN
Status: CANCELLED | OUTPATIENT
Start: 2024-05-09

## 2024-05-09 RX ORDER — FENTANYL CITRATE 50 UG/ML
25 INJECTION, SOLUTION INTRAMUSCULAR; INTRAVENOUS EVERY 5 MIN PRN
Status: CANCELLED | OUTPATIENT
Start: 2024-05-09

## 2024-05-09 RX ORDER — SODIUM CHLORIDE, SODIUM LACTATE, POTASSIUM CHLORIDE, CALCIUM CHLORIDE 600; 310; 30; 20 MG/100ML; MG/100ML; MG/100ML; MG/100ML
100 INJECTION, SOLUTION INTRAVENOUS CONTINUOUS
Status: CANCELLED | OUTPATIENT
Start: 2024-05-09

## 2024-05-09 RX ORDER — OXYCODONE AND ACETAMINOPHEN 5; 325 MG/1; MG/1
1 TABLET ORAL EVERY 4 HOURS PRN
Status: CANCELLED | OUTPATIENT
Start: 2024-05-09

## 2024-05-09 RX ORDER — FENTANYL CITRATE 50 UG/ML
50 INJECTION, SOLUTION INTRAMUSCULAR; INTRAVENOUS EVERY 5 MIN PRN
Status: CANCELLED | OUTPATIENT
Start: 2024-05-09

## 2024-05-09 RX ORDER — ONDANSETRON HYDROCHLORIDE 2 MG/ML
4 INJECTION, SOLUTION INTRAVENOUS ONCE AS NEEDED
Status: CANCELLED | OUTPATIENT
Start: 2024-05-09

## 2024-05-09 RX ORDER — ACETAMINOPHEN 325 MG/1
650 TABLET ORAL EVERY 4 HOURS PRN
Status: CANCELLED | OUTPATIENT
Start: 2024-05-09

## 2024-05-09 RX ORDER — ALBUTEROL SULFATE 0.83 MG/ML
2.5 SOLUTION RESPIRATORY (INHALATION) ONCE AS NEEDED
Status: CANCELLED | OUTPATIENT
Start: 2024-05-09

## 2024-05-10 ENCOUNTER — HOSPITAL ENCOUNTER (OUTPATIENT)
Facility: CLINIC | Age: 59
Setting detail: OUTPATIENT SURGERY
Discharge: HOME | End: 2024-05-10
Attending: ORTHOPAEDIC SURGERY | Admitting: ORTHOPAEDIC SURGERY
Payer: MEDICARE

## 2024-05-10 VITALS
HEART RATE: 78 BPM | DIASTOLIC BLOOD PRESSURE: 79 MMHG | OXYGEN SATURATION: 100 % | SYSTOLIC BLOOD PRESSURE: 129 MMHG | BODY MASS INDEX: 28.72 KG/M2 | RESPIRATION RATE: 16 BRPM | WEIGHT: 182.98 LBS | HEIGHT: 67 IN | TEMPERATURE: 97.5 F

## 2024-05-10 DIAGNOSIS — M65.4 DE QUERVAIN'S TENOSYNOVITIS: ICD-10-CM

## 2024-05-10 DIAGNOSIS — M65.4 RADIAL STYLOID TENOSYNOVITIS (DE QUERVAIN): Primary | ICD-10-CM

## 2024-05-10 PROCEDURE — 25000 INCISION OF TENDON SHEATH: CPT | Performed by: ORTHOPAEDIC SURGERY

## 2024-05-10 PROCEDURE — 7100000010 HC PHASE TWO TIME - EACH INCREMENTAL 1 MINUTE: Performed by: ORTHOPAEDIC SURGERY

## 2024-05-10 PROCEDURE — 3600000003 HC OR TIME - INITIAL BASE CHARGE - PROCEDURE LEVEL THREE: Performed by: ORTHOPAEDIC SURGERY

## 2024-05-10 PROCEDURE — 7100000009 HC PHASE TWO TIME - INITIAL BASE CHARGE: Performed by: ORTHOPAEDIC SURGERY

## 2024-05-10 PROCEDURE — 3600000008 HC OR TIME - EACH INCREMENTAL 1 MINUTE - PROCEDURE LEVEL THREE: Performed by: ORTHOPAEDIC SURGERY

## 2024-05-10 PROCEDURE — 2500000005 HC RX 250 GENERAL PHARMACY W/O HCPCS: Performed by: ORTHOPAEDIC SURGERY

## 2024-05-10 PROCEDURE — 2500000004 HC RX 250 GENERAL PHARMACY W/ HCPCS (ALT 636 FOR OP/ED): Performed by: ORTHOPAEDIC SURGERY

## 2024-05-10 PROCEDURE — A4217 STERILE WATER/SALINE, 500 ML: HCPCS | Performed by: ORTHOPAEDIC SURGERY

## 2024-05-10 RX ORDER — HYDROCODONE BITARTRATE AND ACETAMINOPHEN 5; 325 MG/1; MG/1
1 TABLET ORAL EVERY 6 HOURS PRN
Qty: 12 TABLET | Refills: 0 | Status: SHIPPED | OUTPATIENT
Start: 2024-05-10 | End: 2024-05-13

## 2024-05-10 RX ORDER — SODIUM CHLORIDE 0.9 G/100ML
IRRIGANT IRRIGATION AS NEEDED
Status: DISCONTINUED | OUTPATIENT
Start: 2024-05-10 | End: 2024-05-10 | Stop reason: HOSPADM

## 2024-05-10 ASSESSMENT — PAIN SCALES - GENERAL
PAINLEVEL_OUTOF10: 0 - NO PAIN

## 2024-05-10 ASSESSMENT — PAIN - FUNCTIONAL ASSESSMENT
PAIN_FUNCTIONAL_ASSESSMENT: 0-10

## 2024-05-10 NOTE — H&P
History Of Present Illness  Laurence Campbell is a 58 y.o. female presenting with left wrist de Quervain's tenosynovitis that has failed to improve with non-operative management in the form of multiple corticosteroid injections. She presents for left wrist first dorsal compartment release today with Dr. Sharma.     Past Medical History  Past Medical History:   Diagnosis Date    Abdominal pain, LLQ (left lower quadrant) 07/18/2023    Acute UTI 07/18/2023    Cellulitis 07/18/2023    Constipation 07/18/2023    Encounter for other preprocedural examination     Preop testing    Hx of hyperparathyroidism     Hx of malignant neoplasm of vagina     Iron deficiency anemia     Left wrist pain 07/18/2023    Neurogenic bladder     Neuropathy     Osteoporosis     Recurrent UTI     Right shoulder pain 07/18/2023    Skin irritation 07/18/2023    Stiffness of right shoulder joint 07/18/2023    Vaginal adhesions        Surgical History  Past Surgical History:   Procedure Laterality Date    OTHER SURGICAL HISTORY  11/01/2017    Nephrectomy Right    PARATHYROID GLAND SURGERY      US GUIDED BIOPSY LYMPH NODE SUPERFICIAL  12/03/2018    US GUIDED BIOPSY LYMPH NODE SUPERFICIAL 12/3/2018 [facility]    VAGINAL MASS EXCISION  03/09/2015    Excision Of Vaginal Cyst Or Tumor        Social History  She reports that she has never smoked. She has never used smokeless tobacco. She reports current alcohol use. She reports that she does not use drugs.    Family History  Family History   Problem Relation Name Age of Onset    Endometrial cancer Mother      Hypertension Mother      Obesity Mother      Lung cancer Father      Leukemia Brother      Other (Endometrial carcinoma) Mother          Allergies  Amoxicillin, Penicillins, Alendronate sodium, Alendronic acid, Piperacillin-tazobactam, Sulfa (sulfonamide antibiotics), and Clindamycin    Review of Systems   Musculoskeletal:         Left dorsal radial wrist pain   All other systems reviewed and  are negative.       Physical Exam   Musculoskeletal: Left hand examination reveals focal swelling over the radial styloid with findings consistent for a ganglion cyst at the first compartment. Marked tenderness over the first compartment. Positive Finkelstein maneuver.     Last Recorded Vitals  There were no vitals taken for this visit.     Assessment/Plan   Principal Problem:    De Quervain's tenosynovitis      59 yo F with L wrist de Quervain's tenosynovitis presenting today for left wrist first dorsal compartment release with Dr. Sharma. Plan to proceed with previously mentioned procedure.           Ken Carrillo MD

## 2024-05-10 NOTE — DISCHARGE INSTRUCTIONS
Care Coordinator Progress Note     Admission Date/Time:  8/15/2017  Attending MD:  Minh Jeter MD     Data  Chart reviewed, discussed with interdisciplinary team.   Patient was admitted for:    Cellulitis of right lower extremity  Vesicular eruption of skin  Presence of right artificial knee joint.    Coordination of Care and Referrals: Provided patient/family with options for nothing at this time.        Assessment  Patient had a TKA in beginning of July after which she had recurrent formation of tense bullae in the area on the arthroplasty, found to have MSSA in the blister fluid and completed almost a full course of clindamycin, admitted for management of recurrent blisters.  Chart reviewed.  Patient has been going to OP PT with [facility] in [place].  Resumption order placed.  Per nursing notes, patient has been getting up independently with her cane.  PT to evaluate and give recommendations for discharge.    8/18/17: PT signed off.  Patient already has appointment made at [facility] in [place]     Plan  Anticipated Discharge Date:  Today   Anticipated Discharge Plan:  Home with OP PT    Ariana Henning RN, BSN  Care Coordinator [facility] [place] [address]  Pager: [phone]  Phone: [phone]         Please refer to printed patient instructions

## 2024-05-10 NOTE — OP NOTE
Left wrist first dorsal compartment release / 30 Minutes (L) Operative Note     Date: 5/10/2024  OR Location: [facility]    Name: Laurence Campbell, : 1965, Age: 58 y.o., MRN: [id number], Sex: female    Diagnosis  Pre-op Diagnosis     * De Quervain's tenosynovitis [M65.4] Post-op Diagnosis     * De Quervain's tenosynovitis [M65.4]     Procedures  Left wrist first dorsal compartment release / 30 Minutes  46621 - IA INCISION EXTENSOR TENDON SHEATH WRIST      Surgeons      * Skyler Sharma - Primary    Resident/Fellow/Other Assistant:  Surgeons and Role:     * Ken Carrillo MD - Resident - Assisting    Procedure Summary  Anesthesia: Local  ASA: ASA status not filed in the log.  Anesthesia Staff: No anesthesia staff entered.  Estimated Blood Loss:  0 mL  Intra-op Medications:   Administrations occurring from 1400 to 1445 on 05/10/24:   Medication Name Total Dose   sodium chloride 0.9 % irrigation solution 250 mL   BUPivacaine HCl (Marcaine) 0.5 % (5 mg/mL) 5 mL, lidocaine PF (Xylocaine) 10 mg/mL (1 %) 5 mL syringe 10 mL              Anesthesia Record               Intraprocedure I/O Totals       None           Specimen: No specimens collected     Staff:   Circulator: Adriana Ramirez RN  Scrub Person: Gautam Jain         Drains and/or Catheters: * None in log *    Tourniquet Times:     Total Tourniquet Time Documented:  Arm - Upper (Left) - 10 minutes  Total: Arm - Upper (Left) - 10 minutes      Implants:     Findings: Left wrist first dorsal compartment tenosynovitis    Indications: Laurence Campbell is an 58 y.o. female who is having surgery for De Quervain's tenosynovitis [M65.4].      The patient was seen in the preoperative area. The risks, benefits, complications, treatment options, non-operative alternatives, expected recovery and outcomes were discussed with the patient. The possibilities of reaction to medication, pulmonary aspiration, injury to surrounding structures, bleeding, recurrent  infection, the need for additional procedures, failure to diagnose a condition, and creating a complication requiring transfusion or operation were discussed with the patient. The patient concurred with the proposed plan, giving informed consent.  The site of surgery was properly noted/marked if necessary per policy. The patient has been actively warmed in preoperative area. Preoperative antibiotics are not indicated. Venous thrombosis prophylaxis are not indicated.    Procedure Details:   58-year-old female with symptomatic de Quervain tenosynovitis of the left wrist presents today for left wrist first dorsal compartment release.  Preoperatively the left wrist was identified and marked for surgery.  Informed consent process was completed.    Patient was brought to the operating room placed supine on the operating table.  A timeout procedure was performed to verify the correct patient procedure and operative site.  Local anesthetic infiltrated over the radial styloid.  Left upper extremity prepped and draped in usual sterile fashion.  Limb was exsanguinated and tourniquet was inflated to 250 mmHg.    We made a transverse incision over the radial styloid.  We dissected bluntly down to the first dorsal compartment retinaculum.  Retractors were placed to help protect the superficial radial sensory nerve.  We then made an incision over the dorsal aspect of the first compartment which revealed the extensor pollicis brevis.  We completed the release distally and then traced this tendon back proximally completely releasing the first dorsal compartment leaving a volar retinacular flap.  We found that the APL and EPB tendons were in a common compartment without a separate subsheath but there was extensive tenosynovitis around the APL and EPB tendons.  This was sharply removed.  Independent tendon gliding was demonstrated.  The wound was irrigated and then closed after fashion.  A sterile bandage was applied and the  tourniquet was deflated.  The patient was placed into a thumb spica splint and transferred to the recovery in stable condition.    Postoperatively she will be discharged home once comfortable.  She will remain in her splint until she returns to clinic in 2 weeks at which point we will transition her to a removable splint and allow return to light activities.        Complications:  None; patient tolerated the procedure well.    Disposition: PACU - hemodynamically stable.  Condition: stable         Additional Details:      Attending Attestation: I was present and scrubbed for the entire procedure.    Skyler Sharma  Phone Number: [phone]

## 2024-05-23 ENCOUNTER — OFFICE VISIT (OUTPATIENT)
Dept: ORTHOPEDIC SURGERY | Facility: CLINIC | Age: 59
End: 2024-05-23
Payer: MEDICARE

## 2024-05-23 VITALS — BODY MASS INDEX: 28.56 KG/M2 | HEIGHT: 67 IN | WEIGHT: 182 LBS

## 2024-05-23 DIAGNOSIS — M65.4 DE QUERVAIN'S TENOSYNOVITIS: Primary | ICD-10-CM

## 2024-05-23 PROCEDURE — L3809 WHFO W/O JOINTS PRE OTS: HCPCS | Performed by: PHYSICIAN ASSISTANT

## 2024-05-23 PROCEDURE — 99024 POSTOP FOLLOW-UP VISIT: CPT | Performed by: PHYSICIAN ASSISTANT

## 2024-05-23 PROCEDURE — 1036F TOBACCO NON-USER: CPT | Performed by: PHYSICIAN ASSISTANT

## 2024-05-23 ASSESSMENT — PAIN - FUNCTIONAL ASSESSMENT: PAIN_FUNCTIONAL_ASSESSMENT: NO/DENIES PAIN

## 2024-05-23 NOTE — PROGRESS NOTES
Patient presents to clinic today for first postoperative visit after left wrist de Quervain's tenosynovitis first dorsal compartment release performed on 5/10/2024.  Overall is doing well without significant pain.    Examination: Surgical incision is healing well no significant swelling no surrounding erythema active drainage or signs of active infection.  Light sensation is intact full digital finger range of motion.    Impression: Left wrist de Quervain's tenosynovitis    Plan: We have transition her today to a thumb spica removable brace she may come out of this brace for hygiene purposes and light range of motion activity will avoid any repetitive resisted or weightbearing activity with the left upper extremity.  We will have her return back in 4 weeks at that time we will advance her activities.     Patient was prescribed a thumb spica Velcro wrist brace for de Quervain's. The patient has weakness, instability and/or deformity of their left wrist which requires stabilization from this orthosis to improve their function.      Verbal and written instructions for the use, wear schedule, cleaning and application of this item were given.  Patient was instructed that should the brace result in increased pain, decreased sensation, increased swelling, or an overall worsening of their medical condition, to please contact our office immediately.     Orthotic management and training was provided for skin care, modifications due to healing tissues, edema changes, interruption in skin integrity, and safety precautions with the orthosis.    Richa Bazan PA-C  Department of Orthopaedic Surgery  [facility]    Dictation performed with the use of voice recognition software. Syntax and grammatical errors may exist.

## 2024-05-30 ENCOUNTER — APPOINTMENT (OUTPATIENT)
Dept: PRIMARY CARE | Facility: CLINIC | Age: 59
End: 2024-05-30
Payer: MEDICARE

## 2024-05-31 ENCOUNTER — LAB (OUTPATIENT)
Dept: LAB | Facility: LAB | Age: 59
End: 2024-05-31
Payer: MEDICARE

## 2024-05-31 ENCOUNTER — OFFICE VISIT (OUTPATIENT)
Dept: PRIMARY CARE | Facility: CLINIC | Age: 59
End: 2024-05-31
Payer: MEDICARE

## 2024-05-31 VITALS
WEIGHT: 180 LBS | DIASTOLIC BLOOD PRESSURE: 80 MMHG | RESPIRATION RATE: 24 BRPM | HEART RATE: 78 BPM | SYSTOLIC BLOOD PRESSURE: 124 MMHG | HEIGHT: 67 IN | OXYGEN SATURATION: 99 % | BODY MASS INDEX: 28.25 KG/M2

## 2024-05-31 DIAGNOSIS — R20.2 PARESTHESIA: Primary | ICD-10-CM

## 2024-05-31 DIAGNOSIS — S39.012A LUMBAR STRAIN, INITIAL ENCOUNTER: ICD-10-CM

## 2024-05-31 DIAGNOSIS — R20.2 PARESTHESIA: ICD-10-CM

## 2024-05-31 LAB
ALBUMIN SERPL BCP-MCNC: 4 G/DL (ref 3.4–5)
ALP SERPL-CCNC: 81 U/L (ref 33–110)
ALT SERPL W P-5'-P-CCNC: 12 U/L (ref 7–45)
ANION GAP SERPL CALC-SCNC: 14 MMOL/L (ref 10–20)
AST SERPL W P-5'-P-CCNC: 17 U/L (ref 9–39)
BILIRUB SERPL-MCNC: 0.5 MG/DL (ref 0–1.2)
BUN SERPL-MCNC: 14 MG/DL (ref 6–23)
CALCIUM SERPL-MCNC: 9.7 MG/DL (ref 8.6–10.6)
CHLORIDE SERPL-SCNC: 106 MMOL/L (ref 98–107)
CO2 SERPL-SCNC: 24 MMOL/L (ref 21–32)
CREAT SERPL-MCNC: 1.08 MG/DL (ref 0.5–1.05)
EGFRCR SERPLBLD CKD-EPI 2021: 60 ML/MIN/1.73M*2
ERYTHROCYTE [SEDIMENTATION RATE] IN BLOOD BY WESTERGREN METHOD: NORMAL MM/H
GLUCOSE SERPL-MCNC: 76 MG/DL (ref 74–99)
POTASSIUM SERPL-SCNC: 4.3 MMOL/L (ref 3.5–5.3)
PROT SERPL-MCNC: 7.5 G/DL (ref 6.4–8.2)
PROT SERPL-MCNC: 7.5 G/DL (ref 6.4–8.2)
SODIUM SERPL-SCNC: 140 MMOL/L (ref 136–145)
VIT B12 SERPL-MCNC: 388 PG/ML (ref 211–911)

## 2024-05-31 PROCEDURE — 84165 PROTEIN E-PHORESIS SERUM: CPT

## 2024-05-31 PROCEDURE — 84155 ASSAY OF PROTEIN SERUM: CPT

## 2024-05-31 PROCEDURE — 36415 COLL VENOUS BLD VENIPUNCTURE: CPT

## 2024-05-31 PROCEDURE — 3079F DIAST BP 80-89 MM HG: CPT | Performed by: INTERNAL MEDICINE

## 2024-05-31 PROCEDURE — 80053 COMPREHEN METABOLIC PANEL: CPT

## 2024-05-31 PROCEDURE — 85652 RBC SED RATE AUTOMATED: CPT

## 2024-05-31 PROCEDURE — 82607 VITAMIN B-12: CPT

## 2024-05-31 PROCEDURE — 99214 OFFICE O/P EST MOD 30 MIN: CPT | Performed by: INTERNAL MEDICINE

## 2024-05-31 PROCEDURE — 3074F SYST BP LT 130 MM HG: CPT | Performed by: INTERNAL MEDICINE

## 2024-05-31 RX ORDER — CYCLOBENZAPRINE HCL 10 MG
10 TABLET ORAL 3 TIMES DAILY
Qty: 60 TABLET | Refills: 2 | Status: SHIPPED | OUTPATIENT
Start: 2024-05-31 | End: 2024-07-30

## 2024-05-31 NOTE — PROGRESS NOTES
"Subjective   Patient ID: Laurence Campbell is a 58 y.o. female who presents for Follow-up (6 week follow up bp check/Some toes are numb on both feet comes and goes /Pain in upper right back).    HPI     Review of Systems    B/L numbness of toes/feet-No weakness    Lower back pain    No Fever/chills/headaches/dizziness/chest pains/ cough/ shortness of breath/palpitations/ abdominal pain /Nausea/vomiting/diarrhea/ constipation/urine frequency/blood in urine.      Objective   Resp 24   Ht 1.702 m (5' 7\")   Wt 81.6 kg (180 lb)   SpO2 99%   BMI 28.19 kg/m²     Physical Exam    No JVP elevation. No palpable Lymph Nodes. No Thyromegaly    HEENT- Negative    CVS-NL S1/S2 . No MRG    Lungs-CTA. B/S= B/L    Abdomen-Soft, Non-tender. No masses or HSM    Extremities: No C/C/    Skin-No abnormal moles/rash    LLE swelling L>R        Assessment/Plan     B/L L/E  paresthesia of the toes-     DDX: Idiopathic vs Metabolic    Labs:    SPEP    ESR    CMP    B12    HTN-   Goal < 130/80 - Continue with Amlodipine 2.5 mg daily    Lumbar strain-Continue with Flexeril 10 mg BID/PRN-Follow up with pain management group.    Follow up/ Call with any concerns    Follow up 3 months/PRN        "

## 2024-06-03 ENCOUNTER — APPOINTMENT (OUTPATIENT)
Dept: PAIN MEDICINE | Facility: HOSPITAL | Age: 59
End: 2024-06-03
Payer: MEDICARE

## 2024-06-05 LAB
ALBUMIN: 3.9 G/DL (ref 3.4–5)
ALPHA 1 GLOBULIN: 0.4 G/DL (ref 0.2–0.6)
ALPHA 2 GLOBULIN: 0.7 G/DL (ref 0.4–1.1)
BETA GLOBULIN: 0.9 G/DL (ref 0.5–1.2)
GAMMA GLOBULIN: 1.5 G/DL (ref 0.5–1.4)
PATH REVIEW-SERUM PROTEIN ELECTROPHORESIS: ABNORMAL
PROTEIN ELECTROPHORESIS COMMENT: ABNORMAL

## 2024-06-11 ENCOUNTER — CLINICAL SUPPORT (OUTPATIENT)
Dept: ENDOCRINOLOGY | Facility: CLINIC | Age: 59
End: 2024-06-11
Payer: MEDICARE

## 2024-06-11 DIAGNOSIS — M85.80 OSTEOPENIA, UNSPECIFIED LOCATION: ICD-10-CM

## 2024-06-11 NOTE — PROGRESS NOTES
Prolia injection given to patient beneath the skin in right arm.  Tolerated well without any complications has no c/o pain or discomfort.

## 2024-06-12 ENCOUNTER — OFFICE VISIT (OUTPATIENT)
Dept: PAIN MEDICINE | Facility: CLINIC | Age: 59
End: 2024-06-12
Payer: MEDICARE

## 2024-06-12 VITALS
TEMPERATURE: 97.7 F | DIASTOLIC BLOOD PRESSURE: 86 MMHG | SYSTOLIC BLOOD PRESSURE: 149 MMHG | WEIGHT: 180 LBS | HEART RATE: 88 BPM | HEIGHT: 67 IN | BODY MASS INDEX: 28.25 KG/M2 | RESPIRATION RATE: 18 BRPM | OXYGEN SATURATION: 100 %

## 2024-06-12 DIAGNOSIS — M54.16 LUMBAR NEURITIS: ICD-10-CM

## 2024-06-12 DIAGNOSIS — M54.16 LUMBAR RADICULOPATHY: Primary | ICD-10-CM

## 2024-06-12 PROCEDURE — 3079F DIAST BP 80-89 MM HG: CPT | Performed by: ANESTHESIOLOGY

## 2024-06-12 PROCEDURE — 1036F TOBACCO NON-USER: CPT | Performed by: ANESTHESIOLOGY

## 2024-06-12 PROCEDURE — 3077F SYST BP >= 140 MM HG: CPT | Performed by: ANESTHESIOLOGY

## 2024-06-12 PROCEDURE — 99213 OFFICE O/P EST LOW 20 MIN: CPT | Performed by: ANESTHESIOLOGY

## 2024-06-12 PROCEDURE — 99203 OFFICE O/P NEW LOW 30 MIN: CPT | Performed by: ANESTHESIOLOGY

## 2024-06-12 SDOH — ECONOMIC STABILITY: FOOD INSECURITY: WITHIN THE PAST 12 MONTHS, YOU WORRIED THAT YOUR FOOD WOULD RUN OUT BEFORE YOU GOT MONEY TO BUY MORE.: NEVER TRUE

## 2024-06-12 SDOH — ECONOMIC STABILITY: FOOD INSECURITY: WITHIN THE PAST 12 MONTHS, THE FOOD YOU BOUGHT JUST DIDN'T LAST AND YOU DIDN'T HAVE MONEY TO GET MORE.: NEVER TRUE

## 2024-06-12 ASSESSMENT — ENCOUNTER SYMPTOMS
NUMBNESS: 1
OCCASIONAL FEELINGS OF UNSTEADINESS: 1
LOSS OF SENSATION IN FEET: 0
DYSURIA: 0
BLOOD IN STOOL: 0
ADENOPATHY: 0
SHORTNESS OF BREATH: 0
ABDOMINAL PAIN: 0
BACK PAIN: 1
EYE PAIN: 0
WEAKNESS: 1
DEPRESSION: 0
FEVER: 0

## 2024-06-12 ASSESSMENT — LIFESTYLE VARIABLES
HOW OFTEN DO YOU HAVE SIX OR MORE DRINKS ON ONE OCCASION: NEVER
TOTAL SCORE: 0
SKIP TO QUESTIONS 9-10: 1
HOW MANY STANDARD DRINKS CONTAINING ALCOHOL DO YOU HAVE ON A TYPICAL DAY: 1 OR 2
HOW OFTEN DO YOU HAVE A DRINK CONTAINING ALCOHOL: MONTHLY OR LESS
AUDIT-C TOTAL SCORE: 1

## 2024-06-12 ASSESSMENT — COLUMBIA-SUICIDE SEVERITY RATING SCALE - C-SSRS
2. HAVE YOU ACTUALLY HAD ANY THOUGHTS OF KILLING YOURSELF?: NO
6. HAVE YOU EVER DONE ANYTHING, STARTED TO DO ANYTHING, OR PREPARED TO DO ANYTHING TO END YOUR LIFE?: NO
1. IN THE PAST MONTH, HAVE YOU WISHED YOU WERE DEAD OR WISHED YOU COULD GO TO SLEEP AND NOT WAKE UP?: NO

## 2024-06-12 ASSESSMENT — PATIENT HEALTH QUESTIONNAIRE - PHQ9
1. LITTLE INTEREST OR PLEASURE IN DOING THINGS: NOT AT ALL
2. FEELING DOWN, DEPRESSED OR HOPELESS: NOT AT ALL
SUM OF ALL RESPONSES TO PHQ9 QUESTIONS 1 AND 2: 0

## 2024-06-12 ASSESSMENT — PAIN SCALES - GENERAL
PAINLEVEL_OUTOF10: 7
PAINLEVEL: 8

## 2024-06-12 ASSESSMENT — PAIN DESCRIPTION - DESCRIPTORS: DESCRIPTORS: ACHING

## 2024-06-12 ASSESSMENT — PAIN - FUNCTIONAL ASSESSMENT: PAIN_FUNCTIONAL_ASSESSMENT: 0-10
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
## 2024-06-19 ENCOUNTER — APPOINTMENT (OUTPATIENT)
Dept: HEMATOLOGY/ONCOLOGY | Facility: CLINIC | Age: 59
End: 2024-06-19
Payer: MEDICARE

## 2024-06-20 ENCOUNTER — OFFICE VISIT (OUTPATIENT)
Dept: ORTHOPEDIC SURGERY | Facility: CLINIC | Age: 59
End: 2024-06-20
Payer: MEDICARE

## 2024-06-20 VITALS — HEIGHT: 67 IN | WEIGHT: 180 LBS | BODY MASS INDEX: 28.25 KG/M2

## 2024-06-20 DIAGNOSIS — M65.4 DE QUERVAIN'S TENOSYNOVITIS: Primary | ICD-10-CM

## 2024-06-20 PROCEDURE — 99024 POSTOP FOLLOW-UP VISIT: CPT | Performed by: PHYSICIAN ASSISTANT

## 2024-06-20 ASSESSMENT — PAIN - FUNCTIONAL ASSESSMENT: PAIN_FUNCTIONAL_ASSESSMENT: NO/DENIES PAIN

## 2024-06-20 NOTE — PROGRESS NOTES
Patient returns to clinic today for second postoperative visit after left wrist de Quervain's tenosynovitis first dorsal compartment release.  Overall she is doing very well no complaints today he is anxious to get back to normal activity.    Examination well-healed surgical incision with very mild focal swelling over incision site.  No signs of infection full digital finger range of motion no tenderness over the first dorsal compartment.  Light sensation intact.    Impression: Left wrist de Quervain's tenosynovitis    Plan: She may discontinue use of the splint can resume normal activity and increase activity as tolerated.  No restrictions at this time.  Will return to our office as needed.    Richa Bazan PA-C  Department of Orthopaedic Surgery  [facility]    Dictation performed with the use of voice recognition software. Syntax and grammatical errors may exist.

## 2024-06-21 ENCOUNTER — APPOINTMENT (OUTPATIENT)
Dept: PAIN MEDICINE | Facility: CLINIC | Age: 59
End: 2024-06-21
Payer: MEDICARE

## 2024-06-24 ENCOUNTER — OFFICE VISIT (OUTPATIENT)
Dept: HEMATOLOGY/ONCOLOGY | Facility: CLINIC | Age: 59
End: 2024-06-24
Payer: MEDICARE

## 2024-06-24 ENCOUNTER — TELEPHONE (OUTPATIENT)
Dept: PRIMARY CARE | Facility: CLINIC | Age: 59
End: 2024-06-24
Payer: MEDICARE

## 2024-06-24 VITALS
HEART RATE: 80 BPM | BODY MASS INDEX: 29.64 KG/M2 | OXYGEN SATURATION: 100 % | WEIGHT: 184.41 LBS | SYSTOLIC BLOOD PRESSURE: 161 MMHG | HEIGHT: 66 IN | RESPIRATION RATE: 18 BRPM | TEMPERATURE: 97.6 F | DIASTOLIC BLOOD PRESSURE: 105 MMHG

## 2024-06-24 DIAGNOSIS — D64.9 ANEMIA, UNSPECIFIED TYPE: Primary | ICD-10-CM

## 2024-06-24 LAB
BASOPHILS # BLD AUTO: 0.08 X10*3/UL (ref 0–0.1)
BASOPHILS NFR BLD AUTO: 1.2 %
EOSINOPHIL # BLD AUTO: 0.16 X10*3/UL (ref 0–0.7)
EOSINOPHIL NFR BLD AUTO: 2.4 %
ERYTHROCYTE [DISTWIDTH] IN BLOOD BY AUTOMATED COUNT: 16.7 % (ref 11.5–14.5)
FERRITIN SERPL-MCNC: 30 NG/ML (ref 8–150)
FOLATE SERPL-MCNC: 16 NG/ML
HCT VFR BLD AUTO: 39.4 % (ref 36–46)
HGB BLD-MCNC: 12.9 G/DL (ref 12–16)
IMM GRANULOCYTES # BLD AUTO: 0.01 X10*3/UL (ref 0–0.7)
IMM GRANULOCYTES NFR BLD AUTO: 0.2 % (ref 0–0.9)
IRON SATN MFR SERPL: 77 % (ref 25–45)
IRON SERPL-MCNC: 244 UG/DL (ref 35–150)
LYMPHOCYTES # BLD AUTO: 2.21 X10*3/UL (ref 1.2–4.8)
LYMPHOCYTES NFR BLD AUTO: 33.7 %
MCH RBC QN AUTO: 27.7 PG (ref 26–34)
MCHC RBC AUTO-ENTMCNC: 32.7 G/DL (ref 32–36)
MCV RBC AUTO: 85 FL (ref 80–100)
MONOCYTES # BLD AUTO: 0.66 X10*3/UL (ref 0.1–1)
MONOCYTES NFR BLD AUTO: 10.1 %
NEUTROPHILS # BLD AUTO: 3.44 X10*3/UL (ref 1.2–7.7)
NEUTROPHILS NFR BLD AUTO: 52.4 %
NRBC BLD-RTO: 0 /100 WBCS (ref 0–0)
PLATELET # BLD AUTO: 260 X10*3/UL (ref 150–450)
RBC # BLD AUTO: 4.66 X10*6/UL (ref 4–5.2)
TIBC SERPL-MCNC: 315 UG/DL (ref 240–445)
UIBC SERPL-MCNC: 71 UG/DL (ref 110–370)
VIT B12 SERPL-MCNC: 287 PG/ML (ref 211–911)
WBC # BLD AUTO: 6.6 X10*3/UL (ref 4.4–11.3)

## 2024-06-24 PROCEDURE — 82746 ASSAY OF FOLIC ACID SERUM: CPT | Mod: WESLAB | Performed by: NURSE PRACTITIONER

## 2024-06-24 PROCEDURE — 99214 OFFICE O/P EST MOD 30 MIN: CPT | Performed by: NURSE PRACTITIONER

## 2024-06-24 PROCEDURE — 85025 COMPLETE CBC W/AUTO DIFF WBC: CPT | Performed by: NURSE PRACTITIONER

## 2024-06-24 PROCEDURE — 1036F TOBACCO NON-USER: CPT | Performed by: NURSE PRACTITIONER

## 2024-06-24 PROCEDURE — 82525 ASSAY OF COPPER: CPT | Performed by: NURSE PRACTITIONER

## 2024-06-24 PROCEDURE — 82607 VITAMIN B-12: CPT | Mod: WESLAB | Performed by: NURSE PRACTITIONER

## 2024-06-24 PROCEDURE — 3080F DIAST BP >= 90 MM HG: CPT | Performed by: NURSE PRACTITIONER

## 2024-06-24 PROCEDURE — 82728 ASSAY OF FERRITIN: CPT | Mod: WESLAB | Performed by: NURSE PRACTITIONER

## 2024-06-24 PROCEDURE — 3077F SYST BP >= 140 MM HG: CPT | Performed by: NURSE PRACTITIONER

## 2024-06-24 PROCEDURE — 83540 ASSAY OF IRON: CPT | Mod: WESLAB | Performed by: NURSE PRACTITIONER

## 2024-06-24 ASSESSMENT — ENCOUNTER SYMPTOMS
OCCASIONAL FEELINGS OF UNSTEADINESS: 1
DEPRESSION: 0
LOSS OF SENSATION IN FEET: 0

## 2024-06-24 ASSESSMENT — PATIENT HEALTH QUESTIONNAIRE - PHQ9
2. FEELING DOWN, DEPRESSED OR HOPELESS: NOT AT ALL
SUM OF ALL RESPONSES TO PHQ9 QUESTIONS 1 AND 2: 0
1. LITTLE INTEREST OR PLEASURE IN DOING THINGS: NOT AT ALL

## 2024-06-24 ASSESSMENT — PAIN SCALES - GENERAL: PAINLEVEL: 0-NO PAIN

## 2024-06-24 NOTE — PROGRESS NOTES
Patient here for new patient appointment with Amrit Patel CNP for anemia. Patient was referred here by Dr. Yeager. Patient states that she has a history of blood clots in her bladder due to radiation treatment and frequent UTIs. Patient's last radiation treatment in 2004. Patient states that she has a urinary stent placed and a current UTI. Laurence reports that she had hyperbaric treatment for the blood clots from Jan to April of this year and since has had no bleeding. Patient currently on iron supplementation.     Per orders patient to follow up in 3 months  -labs drawn today    Patient verbalized understanding, no further questions at this time.

## 2024-06-24 NOTE — PROGRESS NOTES
Patient ID: Laurence Campbell is a 58 y.o. female.  Reason for Referral: Anemia  Date of Initial Consult: June 24, 2024    Oncologic History:  Vaginal Cancer 2004  Pelvic external beam radiation with concurrent Cisplatin chemotherapy from 4/9/04 - 5/14/04 followed by low dose brachytherapy given in two applications on 5/25/04 and 6/3/04    Radiation cystitis  Hyperbaric oxygen January - April 2024    History of Present Illness:  Patient presents today for initial hematology consultation for anemia believed to be in the setting of radiation  cystitis. Patient was treated for vaginal cancer in 2004 treated with chemotherapy and radiation therapy.  She had experienced hematuria (with significant clotting). She underwent hyperbaric oxygen January - April 2024. Was requiring several catheterization due to neurogenic bladder this issue has resolved since receiving hyperbaric oxygen treatment.  She does continue to get frequent urinary tract infections and is followed by Dr. Carbajal.    Patient presented to the emergency room on November 27, 2023 for worsening hematuria and clotting.  She was found to have severe anemia at that time with a hemoglobin of 3.8.  She reports receiving 1 dose of IV iron while hospitalized.  She also received 6 units of packed red blood cells.  This prompted further investigation leading to eventual hyperbaric oxygen treatment.  Hematuria has resolved.  She has been on oral iron since November 2023    On presentation today she reports she is doing well overall.  She denies any fevers, chills or night sweats.  No cough, chest pain or shortness of breath.  No nausea or vomiting.  No constipation or diarrhea.  Hematuria has resolved.  She no longer requires self-catheterization.  She does have continued lower extremity lymphedema left greater than right utilizes a cane for ambulation due to her lymphedema.      Review of Systems:  A review of systems has been completed and are negative for  "complaints except what is stated in the HPI and/or past medical history    Allergies:  Amoxicillin, penicillins, alendronic acid, sulfa, clindamycin,  pip-tazo    Medications:  Medication list reviewed with patient and updated in EMR    Past Medical History:  Osteoporosis, HTN    Past Surgical History:  Right nephrectomy 2018), gyn as above, left wrist     Health Maintenance  Colonoscopy  (follow up )  Mammogram  (due)    Family History:  Father - lung cancer (smoker)  Brother - Leukemia     Vital Signs:  BP (!) 161/105 (BP Location: Left arm, Patient Position: Sitting, BP Cuff Size: Adult long)   Pulse 80   Temp 36.4 °C (97.6 °F) (Temporal)   Resp 18   Ht (S) 1.682 m (5' 6.22\")   Wt 83.7 kg (184 lb 6.6 oz)   SpO2 100%   BMI 29.57 kg/m²     Physical Exam:  ECO  Pain: 0  Constitutional: Well developed, awake/alert/oriented x3, no distress, alert and cooperative  Eyes: PER. sclera anicteric  ENMT: Oral mucosa moist  Respiratory/Thorax: Breathing is non-labored. Lungs are clear to auscultation bilaterally. No adventitious breath sounds  Cardiovascular: S1-S2. Regular rate and rhythm. No murmurs, rubs, or gallops appreciated  Gastrointestinal: Abdomen soft nontender, nondistended, normal active bowel sounds.  Musculoskeletal: ROM intact, no joint swelling, normal strength  Extremities: normal extremities, no cyanosis, no clubbing.  Lower extremity edema left greater than right.  Neurologic: alert and oriented x3. Nonfocal exam. No myoclonus  Psychological: Pleasant, appropriate and easily engaged     Lab Results:  2023  WBC 7.8, hemoglobin 4.1, hematocrit 14.2, platelets 358,000  Repeat drawn laboratory 2023 hemoglobin 3.8, hematocrit 13.4    2023  Hemoglobin 6.6, hematocrit 20.2    2023  Iron 13, TIBC 322, percent saturation 4    2024  WBC 5.8, hemoglobin 10.9, hematocrit 35.3, MCV 79, platelets 354,000    May 31, 2024  Vitamin B12 " 388    Assessment/Plan:  No labs have been drawn since March 2024.  Will repeat CBC with differential today as well as iron panel and ferritin.  Patient has been taking oral iron since November 2024.  If she remains anemic anticipate she will need IV iron we will provide her 2 doses of IV Feraheme based on today's laboratory results.  We will then repeat labs in 3 months time.  If patient has been able to improve iron stores and level of anemia on oral iron we will plan to again recheck lab in 3 months time to be certain she has maintaining her counts.        Amrit Patel, APRN-CNP

## 2024-06-24 NOTE — TELEPHONE ENCOUNTER
Called patient and left a voice mail informing her that [facility] would like for her to stop the amlodipine and to contact the office to let us know if the swelling has went down.       Lianna Sandhu MA

## 2024-06-24 NOTE — TELEPHONE ENCOUNTER
PT called in and stated that she has been taking Amlodipine and her ankles are starting to swell and Dr. Yeager said if they swell she might need to switch medication please advise

## 2024-06-26 LAB — COPPER SERPL-MCNC: 154.7 UG/DL (ref 80–155)

## 2024-06-27 ENCOUNTER — APPOINTMENT (OUTPATIENT)
Dept: PAIN MEDICINE | Facility: HOSPITAL | Age: 59
End: 2024-06-27
Payer: MEDICARE

## 2024-06-28 ENCOUNTER — HOSPITAL ENCOUNTER (OUTPATIENT)
Dept: PAIN MEDICINE | Facility: CLINIC | Age: 59
Discharge: HOME | End: 2024-06-28
Payer: MEDICARE

## 2024-06-28 ENCOUNTER — TELEPHONE (OUTPATIENT)
Dept: HEMATOLOGY/ONCOLOGY | Facility: CLINIC | Age: 59
End: 2024-06-28

## 2024-06-28 VITALS
DIASTOLIC BLOOD PRESSURE: 93 MMHG | TEMPERATURE: 97.5 F | OXYGEN SATURATION: 100 % | WEIGHT: 182 LBS | SYSTOLIC BLOOD PRESSURE: 157 MMHG | RESPIRATION RATE: 16 BRPM | HEART RATE: 75 BPM | HEIGHT: 67 IN | BODY MASS INDEX: 28.56 KG/M2

## 2024-06-28 DIAGNOSIS — M54.16 LUMBAR NEURITIS: ICD-10-CM

## 2024-06-28 PROCEDURE — 2500000005 HC RX 250 GENERAL PHARMACY W/O HCPCS: Performed by: ANESTHESIOLOGY

## 2024-06-28 PROCEDURE — 2500000004 HC RX 250 GENERAL PHARMACY W/ HCPCS (ALT 636 FOR OP/ED): Performed by: ANESTHESIOLOGY

## 2024-06-28 PROCEDURE — 2550000001 HC RX 255 CONTRASTS: Performed by: ANESTHESIOLOGY

## 2024-06-28 PROCEDURE — 64483 NJX AA&/STRD TFRM EPI L/S 1: CPT | Mod: 50 | Performed by: ANESTHESIOLOGY

## 2024-06-28 RX ORDER — DEXAMETHASONE SODIUM PHOSPHATE 10 MG/ML
INJECTION INTRAMUSCULAR; INTRAVENOUS AS NEEDED
Status: COMPLETED | OUTPATIENT
Start: 2024-06-28 | End: 2024-06-28

## 2024-06-28 RX ORDER — LIDOCAINE HYDROCHLORIDE 10 MG/ML
INJECTION, SOLUTION EPIDURAL; INFILTRATION; INTRACAUDAL; PERINEURAL AS NEEDED
Status: COMPLETED | OUTPATIENT
Start: 2024-06-28 | End: 2024-06-28

## 2024-06-28 RX ORDER — METHYLPREDNISOLONE ACETATE 40 MG/ML
INJECTION, SUSPENSION INTRA-ARTICULAR; INTRALESIONAL; INTRAMUSCULAR; SOFT TISSUE AS NEEDED
Status: COMPLETED | OUTPATIENT
Start: 2024-06-28 | End: 2024-06-28

## 2024-06-28 RX ORDER — SODIUM CHLORIDE 9 MG/ML
INJECTION, SOLUTION INTRAMUSCULAR; INTRAVENOUS; SUBCUTANEOUS AS NEEDED
Status: COMPLETED | OUTPATIENT
Start: 2024-06-28 | End: 2024-06-28

## 2024-06-28 ASSESSMENT — PAIN SCALES - GENERAL
PAINLEVEL_OUTOF10: 0 - NO PAIN
PAINLEVEL_OUTOF10: 7

## 2024-06-28 ASSESSMENT — PAIN - FUNCTIONAL ASSESSMENT: PAIN_FUNCTIONAL_ASSESSMENT: 0-10

## 2024-06-28 NOTE — PROCEDURES
Procedure Note     Date: 2024  OR Location: [facility] NON-OR PROCEDURES    Name: Laurence Campbell, : 1965, Age: 58 y.o., MRN: [id number], Sex: female    Diagnosis  Preprocedure diagnosis: Lumbar radiculopathy  Postprocedure diagnosis: Same    Procedures  Lumbar transforaminal epidural steroid injection    The patient was seen in the preoperative area. The risks, benefits, complications, treatment options, non-operative alternatives, expected recovery and outcomes were discussed with the patient. The patient concurred with the proposed plan, giving informed consent.          Procedure: The risks and benefits of treatment options and alternatives were discussed with the patient, and consent was obtained for a cervical epidural steroid injection. She wishes to proceed. She was placed in a prone position. The area overlying the bilateral L5-S1 space was cleaned with ChloraPrep solution and draped using standard sterile precautions. Skin was anesthetized with 1% lidocaine. Two 5 inch 25-gauge spinal needles were advanced with AP, lateral, oblique fluoroscopy to the bilateral L5-S1 transforaminal spaces. No paresthesias were induced. 1 cc of Omnipaque was injected demonstrating spread of the dye covering bilateral L5 nerve roots as well as in the epidural space. No intravascular spread was noticed. After negative aspiration for blood and CSF, a solution containing 10 mg of dexamethasone and 2 mL of 1% lidocaine was injected into each side without inducing paresthesia or pain. Patient was transferred to recovery in stable condition and subsequently discharged home.         Complications:  None; patient tolerated the procedure well.    Disposition: Home  Condition: stable         Additional Details: NA    Attending Attestation: I performed the procedure.    Iglesia Montanez MD

## 2024-06-28 NOTE — TELEPHONE ENCOUNTER
Left patient message to call office.  No anemia on most recent lab work.  May discontinue oral iron and we will recheck iron studies in 3 months time as planned.  She is insufficient and vitamin B-12 I recommend 1000 mcg by mouth daily we will recheck this in 6 months time.

## 2024-07-02 DIAGNOSIS — N89.5: ICD-10-CM

## 2024-07-03 RX ORDER — ACYCLOVIR 400 MG/1
400 TABLET ORAL 2 TIMES DAILY
Qty: 180 TABLET | Refills: 0 | Status: SHIPPED | OUTPATIENT
Start: 2024-07-03 | End: 2024-10-01

## 2024-07-15 ENCOUNTER — LAB REQUISITION (OUTPATIENT)
Dept: LAB | Facility: HOSPITAL | Age: 59
End: 2024-07-15
Payer: MEDICARE

## 2024-07-15 DIAGNOSIS — R82.81 PYURIA: ICD-10-CM

## 2024-07-15 PROCEDURE — 87186 SC STD MICRODIL/AGAR DIL: CPT

## 2024-07-15 PROCEDURE — 87086 URINE CULTURE/COLONY COUNT: CPT

## 2024-07-18 LAB — BACTERIA UR CULT: ABNORMAL

## 2024-07-22 ENCOUNTER — LAB REQUISITION (OUTPATIENT)
Dept: LAB | Facility: HOSPITAL | Age: 59
End: 2024-07-22
Payer: MEDICARE

## 2024-07-22 ENCOUNTER — PATIENT MESSAGE (OUTPATIENT)
Dept: OBSTETRICS AND GYNECOLOGY | Facility: CLINIC | Age: 59
End: 2024-07-22

## 2024-07-22 DIAGNOSIS — R33.9 RETENTION OF URINE, UNSPECIFIED: ICD-10-CM

## 2024-07-22 DIAGNOSIS — R82.81 PYURIA: ICD-10-CM

## 2024-07-22 PROCEDURE — 87186 SC STD MICRODIL/AGAR DIL: CPT

## 2024-07-22 PROCEDURE — 87086 URINE CULTURE/COLONY COUNT: CPT

## 2024-07-22 NOTE — TELEPHONE ENCOUNTER
Unable to process refill request, pt has not been seen in over a year. Sent pt a Connect message to make an appt.

## 2024-07-25 ENCOUNTER — OFFICE VISIT (OUTPATIENT)
Dept: NEUROLOGY | Facility: CLINIC | Age: 59
End: 2024-07-25
Payer: MEDICARE

## 2024-07-25 VITALS
TEMPERATURE: 96.2 F | BODY MASS INDEX: 29.41 KG/M2 | WEIGHT: 183 LBS | HEART RATE: 90 BPM | DIASTOLIC BLOOD PRESSURE: 98 MMHG | HEIGHT: 66 IN | SYSTOLIC BLOOD PRESSURE: 142 MMHG

## 2024-07-25 DIAGNOSIS — M54.16 LUMBAR RADICULOPATHY: Primary | ICD-10-CM

## 2024-07-25 LAB — BACTERIA UR CULT: ABNORMAL

## 2024-07-25 PROCEDURE — 3008F BODY MASS INDEX DOCD: CPT | Performed by: PSYCHIATRY & NEUROLOGY

## 2024-07-25 PROCEDURE — 99214 OFFICE O/P EST MOD 30 MIN: CPT | Performed by: PSYCHIATRY & NEUROLOGY

## 2024-07-25 PROCEDURE — 1036F TOBACCO NON-USER: CPT | Performed by: PSYCHIATRY & NEUROLOGY

## 2024-07-25 PROCEDURE — 3077F SYST BP >= 140 MM HG: CPT | Performed by: PSYCHIATRY & NEUROLOGY

## 2024-07-25 PROCEDURE — 3080F DIAST BP >= 90 MM HG: CPT | Performed by: PSYCHIATRY & NEUROLOGY

## 2024-07-25 NOTE — PROGRESS NOTES
NEUROLOGY OUTPATIENT FOLLOW-UP NOTE    Assessment/Plan   Diagnoses and all orders for this visit:  Lumbar radiculopathy      IMPRESSION:  Mild symptoms referable to lumbar radiculopathy.  Chronic left leg weakness from prior lumbosacral plexopathy, unchanged from before.    PLAN:  No acute neurological intervention required at this time.  If the radicular symptoms worsen, I will add a membrane stabilizing agent.  I am happy to see the patient again as needed or requested.       Rafael Delaney Jr., M.D., FAAN   ----------    Subjective     Laurence Campbell is a 58 y.o. year old female here for follow-up.    Returns with tingling paresthesias of the toes on both sides, more so on the left.  This has been intermittent for the last year.  Typically it is more at night than during the day.  She still has the same left leg weakness as before.  She denies other new focal neurological symptoms including dysarthria, dysphagia, diplopia, other focal weakness, other focal sensory change, ataxia, vertigo, or bowel/bladder incontinence, among others.      Past Medical History:   Diagnosis Date    Abdominal pain, LLQ (left lower quadrant) 07/18/2023    Acute UTI 07/18/2023    Anemia     Cellulitis 07/18/2023    Constipation 07/18/2023    Encounter for other preprocedural examination     Preop testing    Hx of hyperparathyroidism     Hx of malignant neoplasm of vagina     Hypertension     Iron deficiency anemia     Left wrist pain 07/18/2023    Neurogenic bladder     Neuropathy     Osteoporosis     Recurrent UTI     Right shoulder pain 07/18/2023    Skin irritation 07/18/2023    Stiffness of right shoulder joint 07/18/2023    Vaginal adhesions     Vaginal cancer (Multi)      Past Surgical History:   Procedure Laterality Date    OTHER SURGICAL HISTORY  11/01/2017    Nephrectomy Right    PARATHYROID GLAND SURGERY      US GUIDED BIOPSY LYMPH NODE SUPERFICIAL  12/03/2018    US GUIDED BIOPSY LYMPH NODE SUPERFICIAL 12/3/2018  [facility]    VAGINAL MASS EXCISION  03/09/2015    Excision Of Vaginal Cyst Or Tumor     Social History     Tobacco Use    Smoking status: Never     Passive exposure: Past    Smokeless tobacco: Never   Substance Use Topics    Alcohol use: Yes     Comment: social     family history includes Endometrial cancer in her mother; Endometrial carcinoma in her mother; Hypertension in her mother; Leukemia in her brother; Lung cancer in her father; Obesity in her mother.    Current Outpatient Medications:     acyclovir (Zovirax) 400 mg tablet, Take 1 tablet (400 mg) by mouth 2 times a day., Disp: 180 tablet, Rfl: 0    alfuzosin (Uroxatral) 10 mg 24 hr tablet, Take 1 tablet (10 mg) by mouth once daily. Do not crush, chew, or split., Disp: , Rfl:     calcium carbonate-vitamin D3 (Caltrate with Vitamin D3) 600 mg-20 mcg (800 unit) tablet, Take 1 tablet by mouth once daily., Disp: , Rfl:     cholecalciferol (Vitamin D-3) 50 mcg (2,000 unit) capsule, Take 2 capsules (100 mcg) by mouth once daily., Disp: , Rfl:     cyclobenzaprine (Flexeril) 10 mg tablet, Take 1 tablet (10 mg) by mouth 3 times a day for 180 doses., Disp: 60 tablet, Rfl: 2    denosumab (Prolia) 60 mg/mL syringe, Physician's office to inject 60mg (1 prefilled syringe) beneath the skin once every 6 months. For office use only., Disp: 1 mL, Rfl: 2    docusate sodium (Colace) 100 mg capsule, Take by mouth 2 times a day as needed for constipation., Disp: , Rfl:     ferrous gluconate (Fergon) 324 (38 Fe) mg tablet, Take 1 tablet (38 mg of iron) by mouth once daily with breakfast., Disp: 90 tablet, Rfl: 3    fluticasone (Flonase) 50 mcg/actuation nasal spray, Administer 1 spray into each nostril once daily., Disp: , Rfl:     multivitamin with minerals (multivitamin-iron-folic acid) tablet, Take 1 tablet by mouth once daily., Disp: , Rfl:     polyethylene glycol (Glycolax, Miralax) 17 gram/dose powder, Take 17 g by mouth once daily as needed., Disp: , Rfl:     Current  "Facility-Administered Medications:     denosumab (Prolia) injection 60 mg, 60 mg, subcutaneous, q6 months, Kamar Treadwell MD  Allergies   Allergen Reactions    Amoxicillin Hives    Penicillins Hives and Itching    Alendronate Sodium Unknown    Alendronic Acid Unknown    Piperacillin-Tazobactam Other    Sulfa (Sulfonamide Antibiotics) Hives and Unknown    Clindamycin Itching, Rash and Hives       Objective     BP (!) 142/98   Pulse 90   Temp 35.7 °C (96.2 °F)   Ht 1.676 m (5' 6\")   Wt 83 kg (183 lb)   BMI 29.54 kg/m²     CONSTITUTIONAL:  No acute distress    MENTAL STATUS:  Awake, alert, fully oriented to self, place, and time, with present short-term memory, good awareness of recent events, normal attention span, concentration, and fund of knowledge.    SPEECH AND LANGUAGE:  Can name and repeat, follows all commands, has no dysarthria    CRANIAL NERVES:  II-Vision present, visual fields full to confrontational testing    III/IV/VI--EOMs are present in all directions.  Pupils are symmetrically reactive in dim light.  No ptosis.    V--Normal facial sensation.    VII--No facial asymmetry.    VIII--Hearing present to voice bilaterally.    IX/X--Symmetric soft palate rise.    XI--Normal trapezius power bilaterally.    XII--Tongue protrudes without deviation.    MOTOR:  Iliacus 3/5 right and 1/5 left, 5/5 hip abduction bilaterally, hip adduction 4/5 right and 3/5 left, hip extension normal bilaterally, quads 5/5 right and 2/5 left, hams 5/5 right and 4/5 left, foot dorsiflexion 5/5 right and 3/5 left, foot plantarflexion 5/5 right and 4/5 left. Normal tone in both legs.     SENSORY:  Reduced pin sensation on the left median and lateral shin, and on the dorsum of the left foot (unchanged).  Otherwise normal pin sensation in both arms and both legs without distal-proximal gradient, asymmetry, or spinal sensory level.    COORDINATION:  Normal finger-to-nose and heel-to-shin testing in both arms and the right leg; " she can't do heel-to-shin testing in the left leg because of proximal leg weakness.    REFLEXES are normal and symmetric at the biceps, triceps, brachioradialis, absent at the patellae, and normal at the ankles.  The plantar responses are flexor.    GAIT is abnormal as she has circumduction and external rotation of the left leg due to weakness.  She has no ataxia, shuffling, or spasticity.      Rafael Delaney Jr., M.D., FAAN

## 2024-07-31 RX ORDER — ALFUZOSIN HYDROCHLORIDE 10 MG/1
TABLET, EXTENDED RELEASE ORAL
Qty: 90 TABLET | Refills: 3 | OUTPATIENT
Start: 2024-07-31

## 2024-08-07 DIAGNOSIS — N39.0 URINARY TRACT INFECTION WITHOUT HEMATURIA, SITE UNSPECIFIED: Primary | ICD-10-CM

## 2024-08-07 PROBLEM — D50.9 IRON DEFICIENCY ANEMIA: Status: RESOLVED | Noted: 2023-10-11 | Resolved: 2024-08-07

## 2024-08-07 PROBLEM — L66.8 OTHER CICATRICIAL ALOPECIA: Status: RESOLVED | Noted: 2020-07-16 | Resolved: 2024-08-07

## 2024-08-07 PROBLEM — R59.0 AXILLARY LYMPHADENOPATHY: Status: RESOLVED | Noted: 2023-07-18 | Resolved: 2024-08-07

## 2024-08-07 PROBLEM — M54.12 CERVICAL NEURITIS: Status: RESOLVED | Noted: 2023-07-18 | Resolved: 2024-08-07

## 2024-08-07 PROBLEM — E55.9 AVITAMINOSIS D: Status: RESOLVED | Noted: 2023-07-18 | Resolved: 2024-08-07

## 2024-08-07 PROBLEM — M79.2 NEUROPATHIC PAIN: Status: RESOLVED | Noted: 2018-06-25 | Resolved: 2024-08-07

## 2024-08-07 PROBLEM — M54.16 LUMBAR NEURITIS: Status: RESOLVED | Noted: 2023-07-18 | Resolved: 2024-08-07

## 2024-08-07 PROBLEM — G54.2 DISORDER OF LEFT CERVICAL NERVE ROOT: Status: RESOLVED | Noted: 2023-07-18 | Resolved: 2024-08-07

## 2024-08-07 PROBLEM — G89.29 MUSCULOSKELETAL PAIN, CHRONIC: Status: RESOLVED | Noted: 2018-06-25 | Resolved: 2024-08-07

## 2024-08-07 PROBLEM — E66.9 OBESITY (BMI 30-39.9): Status: RESOLVED | Noted: 2023-07-18 | Resolved: 2024-08-07

## 2024-08-07 PROBLEM — E87.6 HYPOKALEMIA: Status: RESOLVED | Noted: 2023-07-18 | Resolved: 2024-08-07

## 2024-08-07 PROBLEM — B35.3 TINEA PEDIS: Status: RESOLVED | Noted: 2020-07-16 | Resolved: 2024-08-07

## 2024-08-07 PROBLEM — L03.90 CELLULITIS: Status: RESOLVED | Noted: 2023-10-11 | Resolved: 2024-08-07

## 2024-08-07 PROBLEM — E83.42 HYPOMAGNESEMIA: Status: RESOLVED | Noted: 2023-07-18 | Resolved: 2024-08-07

## 2024-08-07 PROBLEM — L66.89 OTHER CICATRICIAL ALOPECIA: Status: RESOLVED | Noted: 2020-07-16 | Resolved: 2024-08-07

## 2024-08-07 PROBLEM — M79.18 MUSCULOSKELETAL PAIN, CHRONIC: Status: RESOLVED | Noted: 2018-06-25 | Resolved: 2024-08-07

## 2024-08-07 PROBLEM — M85.80 OSTEOPENIA: Status: RESOLVED | Noted: 2023-07-18 | Resolved: 2024-08-07

## 2024-08-07 PROBLEM — E66.9 OBESITY: Status: RESOLVED | Noted: 2023-10-11 | Resolved: 2024-08-07

## 2024-08-07 PROBLEM — I89.0 LYMPHEDEMA: Status: RESOLVED | Noted: 2023-07-18 | Resolved: 2024-08-07

## 2024-08-07 PROBLEM — N31.9 NEUROGENIC BLADDER: Status: RESOLVED | Noted: 2023-07-18 | Resolved: 2024-08-07

## 2024-08-07 PROBLEM — G62.9 NEUROPATHY: Status: RESOLVED | Noted: 2023-07-18 | Resolved: 2024-08-07

## 2024-08-07 PROBLEM — R21 RASH/SKIN ERUPTION: Status: RESOLVED | Noted: 2023-07-18 | Resolved: 2024-08-07

## 2024-08-07 PROBLEM — E78.5 ELEVATED LIPIDS: Status: RESOLVED | Noted: 2023-07-18 | Resolved: 2024-08-07

## 2024-08-07 NOTE — PROGRESS NOTES
"Subjective   Patient ID:   [id number]   Laurence Campbell \"Paula\" is a 58 y.o. female who presents for Medication Reaction (PCN and sulfa).    Chief Complaint   Patient presents with    Medication Reaction     PCN and sulfa      This is a new patient, with a H/O hyperparathyroidism, HTN, osteoporosis, vaginal cancer, presenting for evaluation of drug reactions.    Patient states she saw an ID specialist for recurrent infections.  She has had various Tx with medications and has thus become \"immune\" to several.  She reports she has a UTI currently and the specialist referred her for her multiple drug allergies and testing.  They include the followin.   Sulfa - Age 17.  Fever and facial hives an hour after taking.  2.   Amoxicillin - About 10 years ago.  Generalized itching.  3.   Clindamycin - 2018.  Generalized itching.  4.   Piperacillin - During a hospitalization within the last 5 years.    Patient has avoided her antihistamines the last week in preparation for this visit.  She has had nothing to eat and has only had some water.    Review of Systems   Genitourinary:         She reports having a \"UTI.\"     Objective   Pulse 98   Ht 1.702 m (5' 7\")   Wt 83 kg (183 lb)   BMI 28.66 kg/m²      Physical Exam  Constitutional:       Appearance: Normal appearance.   HENT:      Head: Normocephalic and atraumatic.      Right Ear: External ear normal. There is no impacted cerumen.      Left Ear: External ear normal. There is no impacted cerumen.      Nose: No congestion or rhinorrhea.   Eyes:      Extraocular Movements: Extraocular movements intact.      Conjunctiva/sclera: Conjunctivae normal.      Pupils: Pupils are equal, round, and reactive to light.   Cardiovascular:      Rate and Rhythm: Normal rate and regular rhythm.      Heart sounds: No murmur heard.     No friction rub. No gallop.   Pulmonary:      Effort: No respiratory distress.      Breath sounds: No wheezing, rhonchi or rales.   Skin:     General: Skin " is warm and dry.   Neurological:      Mental Status: She is alert.   Psychiatric:         Mood and Affect: Mood normal.         Behavior: Behavior normal.     Drug/Vaccine allergy testing was performed on Paula Campbell using standard technique. There were no immediate complications.    Test Results  Controls  Negative Saline: 0  Intradermal Saline: 10 x 10  Comments  Comments: Negative.  ANTIGEN 1  Drug/Vaccine Name: Penicillin  Concentration/Solution: 10,000 U    Challenge testing was performed on Paula Campbell using standard technique. There were no immediate complications.    Test Results  Challenge  Type of Challenge: Amoxicillin  Goal Dose: 5 mL  1)  Time: 0935  Dose: 1.25 mL  Reaction / Comment: bp 132/98 p 78 O2 97%  2)  Time: 0956  Dose: 5 ml  Reaction / Comment: 140/96 p 72 O2 98%  3)  Reaction / Comment:  (142/96 p 70 O2 97%)  4)  Time: 1049  Reaction / Comment: Discharged in stable condition.  Result  Pass: Yes  Discharge Comments: May take penicillin.    Skin testing and oral challenge to penicillin is negative.      Assessment/Plan     Penicillin adverse reaction  Skin testing and oral challenge to penicillin is negative.   Let pharmacist know you may safely take penicillin/amoxicillin.      Drug allergy  Skin testing and oral challenge to penicillin/amoxicillin is negative.    I would like her to return for skin test to sulfa followed by an oral challenge on a different day.  Ultimately she will need testing to clindamycin also.  There is no clinical utility in testing for piperacillin.  There is no clear documentation that she was unable to tolerate it.  In addition she is not allergic to penicillin    By signing my name below, I, Karol Osorio, attest that this documentation has been prepared under the direction and in the presence of Cammy Kent MD.  All medical record entries made by the Karol were at my direction and personally dictated by me. I have reviewed the chart and agree  that the record accurately reflects my personal performance of the history, physical exam, discussion and plan.

## 2024-08-08 ENCOUNTER — CONSULT (OUTPATIENT)
Dept: ALLERGY | Facility: CLINIC | Age: 59
End: 2024-08-08
Payer: MEDICARE

## 2024-08-08 VITALS — BODY MASS INDEX: 28.72 KG/M2 | WEIGHT: 183 LBS | HEIGHT: 67 IN | HEART RATE: 98 BPM

## 2024-08-08 DIAGNOSIS — T36.0X5A ADVERSE EFFECT OF PENICILLIN, INITIAL ENCOUNTER: Primary | ICD-10-CM

## 2024-08-08 PROCEDURE — 99204 OFFICE O/P NEW MOD 45 MIN: CPT | Performed by: ALLERGY & IMMUNOLOGY

## 2024-08-08 PROCEDURE — 95024 IQ TESTS W/ALLERGENIC XTRCS: CPT | Performed by: ALLERGY & IMMUNOLOGY

## 2024-08-08 PROCEDURE — 95076 INGEST CHALLENGE INI 120 MIN: CPT | Performed by: ALLERGY & IMMUNOLOGY

## 2024-08-08 PROCEDURE — 95018 ALL TSTG PERQ&IQ DRUGS/BIOL: CPT | Performed by: ALLERGY & IMMUNOLOGY

## 2024-08-08 RX ORDER — AMLODIPINE BESYLATE 2.5 MG/1
2.5 TABLET ORAL
COMMUNITY

## 2024-08-08 NOTE — ASSESSMENT & PLAN NOTE
Skin testing and oral challenge to penicillin is negative.   Let pharmacist know you may safely take penicillin/amoxicillin.

## 2024-08-09 ENCOUNTER — APPOINTMENT (OUTPATIENT)
Dept: LAB | Facility: LAB | Age: 59
End: 2024-08-09
Payer: MEDICARE

## 2024-08-11 PROBLEM — Z88.9 DRUG ALLERGY: Status: ACTIVE | Noted: 2024-08-11

## 2024-08-12 NOTE — ASSESSMENT & PLAN NOTE
Skin testing and oral challenge to penicillin/amoxicillin is negative.    I would like her to return for skin test to sulfa followed by an oral challenge on a different day.  Ultimately she will need testing to clindamycin also.  There is no clinical utility in testing for piperacillin.  There is no clear documentation that she was unable to tolerate it.  In addition she is not allergic to penicillin

## 2024-08-16 ENCOUNTER — APPOINTMENT (OUTPATIENT)
Dept: PAIN MEDICINE | Facility: CLINIC | Age: 59
End: 2024-08-16
Payer: MEDICARE

## 2024-08-16 VITALS
RESPIRATION RATE: 18 BRPM | HEART RATE: 91 BPM | DIASTOLIC BLOOD PRESSURE: 83 MMHG | BODY MASS INDEX: 29.03 KG/M2 | WEIGHT: 185 LBS | HEIGHT: 67 IN | TEMPERATURE: 97.2 F | OXYGEN SATURATION: 98 % | SYSTOLIC BLOOD PRESSURE: 134 MMHG

## 2024-08-16 DIAGNOSIS — M54.16 LUMBAR RADICULOPATHY: Primary | ICD-10-CM

## 2024-08-16 PROCEDURE — 1036F TOBACCO NON-USER: CPT | Performed by: ANESTHESIOLOGY

## 2024-08-16 PROCEDURE — 99213 OFFICE O/P EST LOW 20 MIN: CPT | Performed by: ANESTHESIOLOGY

## 2024-08-16 PROCEDURE — 3075F SYST BP GE 130 - 139MM HG: CPT | Performed by: ANESTHESIOLOGY

## 2024-08-16 PROCEDURE — 3008F BODY MASS INDEX DOCD: CPT | Performed by: ANESTHESIOLOGY

## 2024-08-16 PROCEDURE — 3079F DIAST BP 80-89 MM HG: CPT | Performed by: ANESTHESIOLOGY

## 2024-08-16 ASSESSMENT — ENCOUNTER SYMPTOMS
EYE PAIN: 0
CHEST TIGHTNESS: 0
FEVER: 0
WEAKNESS: 0
LOSS OF SENSATION IN FEET: 1
ADENOPATHY: 0
DEPRESSION: 1
BLOOD IN STOOL: 0
DIFFICULTY URINATING: 0
OCCASIONAL FEELINGS OF UNSTEADINESS: 1
BACK PAIN: 1
NUMBNESS: 0

## 2024-08-16 ASSESSMENT — PAIN SCALES - GENERAL
PAINLEVEL_OUTOF10: 4
PAINLEVEL: 5

## 2024-08-16 ASSESSMENT — PAIN DESCRIPTION - DESCRIPTORS: DESCRIPTORS: PINS AND NEEDLES;RADIATING

## 2024-08-16 ASSESSMENT — PAIN - FUNCTIONAL ASSESSMENT: PAIN_FUNCTIONAL_ASSESSMENT: 0-10
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
## 2024-08-22 ENCOUNTER — LAB (OUTPATIENT)
Dept: LAB | Facility: LAB | Age: 59
End: 2024-08-22
Payer: MEDICARE

## 2024-08-22 DIAGNOSIS — N39.0 URINARY TRACT INFECTION WITHOUT HEMATURIA, SITE UNSPECIFIED: ICD-10-CM

## 2024-08-22 LAB
APPEARANCE UR: ABNORMAL
BILIRUB UR STRIP.AUTO-MCNC: NEGATIVE MG/DL
COLOR UR: ABNORMAL
GLUCOSE UR STRIP.AUTO-MCNC: NORMAL MG/DL
KETONES UR STRIP.AUTO-MCNC: NEGATIVE MG/DL
LEUKOCYTE ESTERASE UR QL STRIP.AUTO: ABNORMAL
MUCOUS THREADS #/AREA URNS AUTO: ABNORMAL /LPF
NITRITE UR QL STRIP.AUTO: NEGATIVE
PH UR STRIP.AUTO: 6 [PH]
PROT UR STRIP.AUTO-MCNC: ABNORMAL MG/DL
RBC # UR STRIP.AUTO: ABNORMAL /UL
RBC #/AREA URNS AUTO: >20 /HPF
SP GR UR STRIP.AUTO: 1.02
UROBILINOGEN UR STRIP.AUTO-MCNC: NORMAL MG/DL
WBC #/AREA URNS AUTO: >50 /HPF
WBC CLUMPS #/AREA URNS AUTO: ABNORMAL /HPF

## 2024-08-22 PROCEDURE — 87086 URINE CULTURE/COLONY COUNT: CPT

## 2024-08-22 PROCEDURE — 81001 URINALYSIS AUTO W/SCOPE: CPT

## 2024-08-23 LAB — BACTERIA UR CULT: NORMAL

## 2024-08-30 ENCOUNTER — LAB REQUISITION (OUTPATIENT)
Dept: LAB | Facility: HOSPITAL | Age: 59
End: 2024-08-30
Payer: MEDICARE

## 2024-08-30 ENCOUNTER — APPOINTMENT (OUTPATIENT)
Dept: PRIMARY CARE | Facility: CLINIC | Age: 59
End: 2024-08-30
Payer: MEDICARE

## 2024-08-30 DIAGNOSIS — R31.0 GROSS HEMATURIA: ICD-10-CM

## 2024-08-30 PROCEDURE — 87086 URINE CULTURE/COLONY COUNT: CPT

## 2024-09-01 LAB — BACTERIA UR CULT: NORMAL

## 2024-09-11 ENCOUNTER — LAB REQUISITION (OUTPATIENT)
Dept: LAB | Facility: HOSPITAL | Age: 59
End: 2024-09-11
Payer: MEDICARE

## 2024-09-11 DIAGNOSIS — N13.1 HYDRONEPHROSIS DUE TO OBSTRUCTION OF URETER: Primary | ICD-10-CM

## 2024-09-11 DIAGNOSIS — R31.0 GROSS HEMATURIA: ICD-10-CM

## 2024-09-11 PROCEDURE — 87086 URINE CULTURE/COLONY COUNT: CPT

## 2024-09-12 LAB — BACTERIA UR CULT: NORMAL

## 2024-09-18 ENCOUNTER — PRE-ADMISSION TESTING (OUTPATIENT)
Dept: PREADMISSION TESTING | Facility: HOSPITAL | Age: 59
End: 2024-09-18
Payer: MEDICARE

## 2024-09-18 ENCOUNTER — LAB (OUTPATIENT)
Dept: LAB | Facility: LAB | Age: 59
End: 2024-09-18
Payer: MEDICARE

## 2024-09-18 VITALS
OXYGEN SATURATION: 100 % | SYSTOLIC BLOOD PRESSURE: 138 MMHG | DIASTOLIC BLOOD PRESSURE: 95 MMHG | WEIGHT: 191.1 LBS | HEART RATE: 81 BPM | HEIGHT: 67 IN | TEMPERATURE: 96.8 F | BODY MASS INDEX: 29.99 KG/M2 | RESPIRATION RATE: 16 BRPM

## 2024-09-18 DIAGNOSIS — D64.9 ANEMIA, UNSPECIFIED TYPE: ICD-10-CM

## 2024-09-18 DIAGNOSIS — Z01.818 PREOP TESTING: ICD-10-CM

## 2024-09-18 DIAGNOSIS — Z01.818 PREOP TESTING: Primary | ICD-10-CM

## 2024-09-18 LAB
ANION GAP SERPL CALCULATED.3IONS-SCNC: 14 MMOL/L (ref 10–20)
BASOPHILS # BLD AUTO: 0.09 X10*3/UL (ref 0–0.1)
BASOPHILS NFR BLD AUTO: 1.1 %
BUN SERPL-MCNC: 16 MG/DL (ref 6–23)
CALCIUM SERPL-MCNC: 9.7 MG/DL (ref 8.6–10.3)
CHLORIDE SERPL-SCNC: 108 MMOL/L (ref 98–107)
CO2 SERPL-SCNC: 22 MMOL/L (ref 21–32)
CREAT SERPL-MCNC: 0.97 MG/DL (ref 0.5–1.05)
EGFRCR SERPLBLD CKD-EPI 2021: 68 ML/MIN/1.73M*2
EOSINOPHIL # BLD AUTO: 0.25 X10*3/UL (ref 0–0.7)
EOSINOPHIL NFR BLD AUTO: 3 %
ERYTHROCYTE [DISTWIDTH] IN BLOOD BY AUTOMATED COUNT: 15.9 % (ref 11.5–14.5)
FERRITIN SERPL-MCNC: 31 NG/ML (ref 8–150)
GLUCOSE SERPL-MCNC: 67 MG/DL (ref 74–99)
HCT VFR BLD AUTO: 43.9 % (ref 36–46)
HGB BLD-MCNC: 13.7 G/DL (ref 12–16)
IMM GRANULOCYTES # BLD AUTO: 0.03 X10*3/UL (ref 0–0.7)
IMM GRANULOCYTES NFR BLD AUTO: 0.4 % (ref 0–0.9)
IRON SATN MFR SERPL: 18 % (ref 25–45)
IRON SERPL-MCNC: 68 UG/DL (ref 35–150)
LYMPHOCYTES # BLD AUTO: 2.16 X10*3/UL (ref 1.2–4.8)
LYMPHOCYTES NFR BLD AUTO: 25.6 %
MCH RBC QN AUTO: 27 PG (ref 26–34)
MCHC RBC AUTO-ENTMCNC: 31.2 G/DL (ref 32–36)
MCV RBC AUTO: 86 FL (ref 80–100)
MONOCYTES # BLD AUTO: 0.79 X10*3/UL (ref 0.1–1)
MONOCYTES NFR BLD AUTO: 9.4 %
NEUTROPHILS # BLD AUTO: 5.11 X10*3/UL (ref 1.2–7.7)
NEUTROPHILS NFR BLD AUTO: 60.5 %
NRBC BLD-RTO: 0 /100 WBCS (ref 0–0)
PLATELET # BLD AUTO: 290 X10*3/UL (ref 150–450)
POTASSIUM SERPL-SCNC: 4.3 MMOL/L (ref 3.5–5.3)
RBC # BLD AUTO: 5.08 X10*6/UL (ref 4–5.2)
SODIUM SERPL-SCNC: 140 MMOL/L (ref 136–145)
TIBC SERPL-MCNC: 374 UG/DL (ref 240–445)
UIBC SERPL-MCNC: 306 UG/DL (ref 110–370)
WBC # BLD AUTO: 8.4 X10*3/UL (ref 4.4–11.3)

## 2024-09-18 PROCEDURE — 85025 COMPLETE CBC W/AUTO DIFF WBC: CPT

## 2024-09-18 PROCEDURE — 82728 ASSAY OF FERRITIN: CPT

## 2024-09-18 PROCEDURE — 36415 COLL VENOUS BLD VENIPUNCTURE: CPT

## 2024-09-18 PROCEDURE — 99203 OFFICE O/P NEW LOW 30 MIN: CPT | Performed by: NURSE PRACTITIONER

## 2024-09-18 PROCEDURE — 82306 VITAMIN D 25 HYDROXY: CPT

## 2024-09-18 PROCEDURE — 83540 ASSAY OF IRON: CPT

## 2024-09-18 PROCEDURE — 83550 IRON BINDING TEST: CPT

## 2024-09-18 PROCEDURE — 80048 BASIC METABOLIC PNL TOTAL CA: CPT

## 2024-09-18 ASSESSMENT — DUKE ACTIVITY SCORE INDEX (DASI)
CAN YOU CLIMB A FLIGHT OF STAIRS OR WALK UP A HILL: YES
CAN YOU DO LIGHT WORK AROUND THE HOUSE LIKE DUSTING OR WASHING DISHES: YES
CAN YOU PARTICIPATE IN STRENOUS SPORTS LIKE SWIMMING, SINGLES TENNIS, FOOTBALL, BASKETBALL, OR SKIING: NO
DASI METS SCORE: 5.7
CAN YOU DO YARD WORK LIKE RAKING LEAVES, WEEDING OR PUSHING A MOWER: NO
CAN YOU WALK A BLOCK OR TWO ON LEVEL GROUND: YES
CAN YOU PARTICIPATE IN MODERATE RECREATIONAL ACTIVITIES LIKE GOLF, BOWLING, DANCING, DOUBLES TENNIS OR THROWING A BASEBALL OR FOOTBALL: NO
CAN YOU DO HEAVY WORK AROUND THE HOUSE LIKE SCRUBBING FLOORS OR LIFTING AND MOVING HEAVY FURNITURE: NO
CAN YOU HAVE SEXUAL RELATIONS: YES
CAN YOU TAKE CARE OF YOURSELF (EAT, DRESS, BATHE, OR USE TOILET): YES
CAN YOU DO MODERATE WORK AROUND THE HOUSE LIKE VACUUMING, SWEEPING FLOORS OR CARRYING GROCERIES: YES
CAN YOU WALK INDOORS, SUCH AS AROUND YOUR HOUSE: YES
TOTAL_SCORE: 24.2
CAN YOU RUN A SHORT DISTANCE: NO

## 2024-09-18 ASSESSMENT — ENCOUNTER SYMPTOMS
CARDIOVASCULAR NEGATIVE: 1
RESPIRATORY NEGATIVE: 1
EYES NEGATIVE: 1
CONSTITUTIONAL NEGATIVE: 1
ENDOCRINE NEGATIVE: 1
HEMATOLOGIC/LYMPHATIC NEGATIVE: 1
PSYCHIATRIC NEGATIVE: 1
GASTROINTESTINAL NEGATIVE: 1
BACK PAIN: 1

## 2024-09-18 ASSESSMENT — PAIN SCALES - GENERAL: PAINLEVEL_OUTOF10: 0 - NO PAIN

## 2024-09-18 ASSESSMENT — PAIN - FUNCTIONAL ASSESSMENT: PAIN_FUNCTIONAL_ASSESSMENT: 0-10

## 2024-09-18 NOTE — H&P (VIEW-ONLY)
"CPM/PAT Evaluation       Name: Laurence Campbell (Laurence Campbell \"Paula\")  /Age: 1965/58 y.o.     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 Pt reports she has the stent in her ureter replaced every 6 months routinely. Pt also reports problems with frequent UTIs. Pt stated she has a recent urine culture that was normal. Pt denies fever, chills, dysuria, or urinary frequency. Pt has been scheduled for cystoscopy with removal or ureteral  stent and cystoscopy with insertion ureteral stent ureter. Pt denies CP, SOB, or dizziness.     Past Medical History:   Diagnosis Date    Abdominal pain, LLQ (left lower quadrant) 2023    Acute UTI 2023    Anemia     Constipation 2023    Hx of hyperparathyroidism     Hx of malignant neoplasm of vagina     Hypertension     Iron deficiency anemia     Left wrist pain 2023    Neurogenic bladder     Neuropathy     Osteoporosis     Recurrent UTI     Right shoulder pain 2023    Skin irritation 2023    Stiffness of right shoulder joint 2023    Vaginal adhesions     Vaginal cancer (Multi)        Past Surgical History:   Procedure Laterality Date    OTHER SURGICAL HISTORY  2017    Nephrectomy Right    PARATHYROID GLAND SURGERY      US GUIDED BIOPSY LYMPH NODE SUPERFICIAL  2018    US GUIDED BIOPSY LYMPH NODE SUPERFICIAL 12/3/2018 [facility]    VAGINAL MASS EXCISION  2015    Excision Of Vaginal Cyst Or Tumor     Social History     Tobacco Use    Smoking status: Never     Passive exposure: Past    Smokeless tobacco: Never   Substance Use Topics    Alcohol use: Yes     Comment: social LESS THAN 1 X WEEK     Social History     Substance and Sexual Activity   Drug Use Never       Patient  reports that she is not currently sexually active.    Family History "   Problem Relation Name Age of Onset    Endometrial cancer Mother      Hypertension Mother      Obesity Mother      Lung cancer Father      Leukemia Brother      Other (Endometrial carcinoma) Mother         Allergies   Allergen Reactions    Alendronate Sodium Unknown    Alendronic Acid Unknown    Piperacillin-Tazobactam Other     Review of records 8/2023 patient tolerated IV Zosyn (pip-tazo) without side effects.     Sulfa (Sulfonamide Antibiotics) Hives and Unknown    Clindamycin Itching, Rash and Hives     Current Outpatient Medications   Medication Sig Dispense Refill    acyclovir (Zovirax) 400 mg tablet Take 1 tablet (400 mg) by mouth 2 times a day. 180 tablet 0    alfuzosin (Uroxatral) 10 mg 24 hr tablet Take 1 tablet (10 mg) by mouth once daily. Do not crush, chew, or split.      amLODIPine (Norvasc) 2.5 mg tablet Take 1 tablet (2.5 mg) by mouth once daily.      calcium carbonate-vitamin D3 (Caltrate with Vitamin D3) 600 mg-20 mcg (800 unit) tablet Take 1 tablet by mouth once daily.      cholecalciferol (Vitamin D-3) 50 mcg (2,000 unit) capsule Take 2 capsules (100 mcg) by mouth once daily.      cyclobenzaprine (Flexeril) 10 mg tablet Take 1 tablet (10 mg) by mouth 3 times a day for 180 doses. (Patient taking differently: Take 1 tablet (10 mg) by mouth 3 times a day as needed for muscle spasms.) 60 tablet 2    denosumab (Prolia) 60 mg/mL syringe Physician's office to inject 60mg (1 prefilled syringe) beneath the skin once every 6 months. For office use only. 1 mL 2    docusate sodium (Colace) 100 mg capsule Take by mouth 2 times a day as needed for constipation.      multivitamin with minerals (multivitamin-iron-folic acid) tablet Take 1 tablet by mouth once daily.      polyethylene glycol (Glycolax, Miralax) 17 gram/dose powder Take 17 g by mouth once daily as needed.       Current Facility-Administered Medications   Medication Dose Route Frequency Provider Last Rate Last Admin    denosumab (Prolia) injection  "60 mg  60 mg subcutaneous q6 months Kamar Treadwell MD         Review of Systems   Constitutional: Negative.    HENT: Negative.     Eyes: Negative.    Respiratory: Negative.     Cardiovascular: Negative.    Gastrointestinal: Negative.    Endocrine: Negative.    Genitourinary:         H/O  vaginal cancer s/p surgery chemotherapy and radiation.    Musculoskeletal:  Positive for back pain.   Skin: Negative.    Neurological:         Left foot peripheral neuropathy   Hematological: Negative.    Psychiatric/Behavioral: Negative.       BP (!) 138/95   Pulse 81   Temp 36 °C (96.8 °F) (Temporal)   Resp 16   Ht 1.702 m (5' 7\")   Wt 86.7 kg (191 lb 1.6 oz)   SpO2 100%   BMI 29.93 kg/m²     Physical Exam  Vitals reviewed.   Constitutional:       Appearance: Normal appearance.   HENT:      Head: Normocephalic and atraumatic.      Nose: Nose normal.      Mouth/Throat:      Mouth: Mucous membranes are moist.      Pharynx: Oropharynx is clear.   Eyes:      Extraocular Movements: Extraocular movements intact.      Conjunctiva/sclera: Conjunctivae normal.      Pupils: Pupils are equal, round, and reactive to light.   Cardiovascular:      Rate and Rhythm: Normal rate and regular rhythm.      Pulses: Normal pulses.      Heart sounds: Normal heart sounds.   Pulmonary:      Effort: Pulmonary effort is normal.      Breath sounds: Normal breath sounds.   Abdominal:      General: Bowel sounds are normal.      Palpations: Abdomen is soft.   Musculoskeletal:      Cervical back: Normal range of motion and neck supple.      Left lower leg: Edema present.   Skin:     General: Skin is warm and dry.   Neurological:      General: No focal deficit present.      Mental Status: She is alert and oriented to person, place, and time. Mental status is at baseline.   Psychiatric:         Mood and Affect: Mood normal.         Behavior: Behavior normal.         Thought Content: Thought content normal.         Judgment: Judgment normal.        PAT " AIRWAY:   Airway:     Mallampati::  II    TM distance::  >3 FB    Neck ROM::  Full  normal      ASA:2  DASI: 24.2  METS: 5.7  CHADS: 2.8%  RCRI: 0.4%  STOP BAN    Assessment and Plan:     Hydronephrosis: Cystoscopy with Removal Stent Ureter, Cystoscopy with Insertion Stent Ureter.  HTN: managed with amlodipine.   H/O vaginal cancer s/p surgery chemotherapy and radiation therapy . Pt has residual left lower leg peripheral neuropathy and left leg lymphedema.  Pt managed her left leg lymphedema with a compression stocking. H/O radiation cystitis treated in past with Hyperbaric chamber.   Chronic lower back pain with lumbar radiculopathy.  H/O right nephrectomy:  per pt it was no longer functioning. Checking BMP in PAT.   Recurrent UTI: recent urine culture 2024- No growth  H/O anemia: CBC checked on 2024  Osteoporosis: pt is on Prolia injection every 6 months.   Occasional constipation     Checking BMP in PAT.    Humera Potter APRN-CNP

## 2024-09-18 NOTE — PREPROCEDURE INSTRUCTIONS
Medication List            Accurate as of September 18, 2024  1:15 PM. Always use your most recent med list.                acyclovir 400 mg tablet  Commonly known as: Zovirax  Take 1 tablet (400 mg) by mouth 2 times a day.  Medication Adjustments for Surgery: Take on the morning of surgery     alfuzosin 10 mg 24 hr tablet  Commonly known as: Uroxatral  Medication Adjustments for Surgery: Take on the morning of surgery     amLODIPine 2.5 mg tablet  Commonly known as: Norvasc  Medication Adjustments for Surgery: Take on the morning of surgery     Caltrate with Vitamin D3 600 mg-20 mcg (800 unit) tablet  Generic drug: calcium carbonate-vitamin D3  Additional Medication Adjustments for Surgery: Take last dose 7 days before surgery     cholecalciferol 50 mcg (2,000 unit) capsule  Commonly known as: Vitamin D-3  Additional Medication Adjustments for Surgery: Take last dose 7 days before surgery     cyclobenzaprine 10 mg tablet  Commonly known as: Flexeril  Take 1 tablet (10 mg) by mouth 3 times a day for 180 doses.  Medication Adjustments for Surgery: Do Not take on the morning of surgery     docusate sodium 100 mg capsule  Commonly known as: Colace  Medication Adjustments for Surgery: Do Not take on the morning of surgery     multivitamin with minerals tablet  Additional Medication Adjustments for Surgery: Take last dose 7 days before surgery     polyethylene glycol 17 gram/dose powder  Commonly known as: Glycolax, Miralax  Medication Adjustments for Surgery: Do Not take on the morning of surgery     Prolia 60 mg/mL syringe  Generic drug: denosumab  Physician's office to inject 60mg (1 prefilled syringe) beneath the skin once every 6 months. For office use only.  Notes to patient: LAST DOSE JUNE 2024                              NPO Instructions:    Do not eat any food after midnight the night before your surgery/procedure.    Additional Instructions:     Day of Surgery:  Review your medication instructions, take  indicated medications  Wear  comfortable loose fitting clothing  Do not use moisturizers, creams, lotions or perfume  All jewelry and valuables should be left at home      Why must I stop eating and drinking near surgery time?  With sedation, food or liquid in your stomach can enter your lungs causing serious complications  Increases nausea and vomiting    When do I need to stop eating and drinking before my surgery?   Do not eat or drink after midnight the night before your surgery/procedure.  You may have small sips of water to take your medication.    PAT DISCHARGE INSTRUCTIONS    Please call the Same Day Surgery (SDS) Department of the hospital where your procedure will be performed after 2:00 PM the day before your surgery. If you are scheduled on a Monday, or a Tuesday following a Monday holiday, you will need to call on the last business day prior to your surgery.      [facility]  [address] [place] [address]  [phone]    Please let your surgeon know if:      You develop any open sores, shingles, burning or painful urination as these may increase your risk of an infection.   You no longer wish to have the surgery.   Any other personal circumstances change that may lead to the need to cancel or defer this surgery-such as being sick or getting admitted to any hospital within one week of your planned procedure.    Your contact details change, such as a change of address or phone number.    Starting now:     Please DO NOT drink alcohol or smoke for 24 hours before surgery. It is well known that quitting smoking can make a huge difference to your health and recovery from surgery. The longer you abstain from smoking, the better your chances of a healthy recovery. If you need help with quitting, call 800-QUIT-NOW to be connected to a trained counselor who will discuss the best methods to help you quit.     Before your surgery:    Please stop all supplements 7 days  prior to surgery. Or as directed by your surgeon.   Please stop taking NSAID pain medicine such as Advil and Motrin 7 days before surgery.    If you develop any fever, cough, cold, rashes, cuts, scratches, scrapes, urinary symptoms or infection anywhere on your body (including teeth and gums) prior to surgery, please call your surgeon’s office as soon as possible. This may require treatment to reduce the chance of cancellation on the day of surgery.    The day before your surgery:   DIET- Please follow the diet instructions at the top of your packet.   Get a good night’s rest.  Use the special soap for bathing if you have been instructed to use one.    Scheduled surgery times may change and you will be notified if this occurs - please check your personal voicemail for any updates.     On the morning of surgery:   Wear comfortable, loose fitting clothes which open in the front. Please do not wear moisturizers, creams, lotions, makeup or perfume.    Please bring with you to surgery:   Photo ID and insurance card   Current list of medicines and allergies   Pacemaker/ Defibrillator/Heart stent cards   CPAP machine and mask    Slings/ splints/ crutches   A copy of your complete advanced directive/DHPOA.    Please do NOT bring with you to surgery:   All jewelry and valuables should be left at home.   Prosthetic devices such as contact lenses, hearing aids, dentures, eyelash extensions, hairpins and body piercings must be removed prior to going in to the surgical suite.    After outpatient surgery:   A responsible adult MUST accompany you at the time of discharge and stay with you for 24 hours after your surgery. You may NOT drive yourself home after surgery.    Do not drive, operate machinery, make critical decisions or do activities that require co-ordination or balance until after a night’s sleep.   Do not drink alcoholic beverages for 24 hours.   Instructions for resuming your medications will be provided by your  surgeon.    CALL YOUR DOCTOR AFTER SURGERY IF YOU HAVE:     Chills and/or a fever of 101° F or higher.    Redness, swelling, pus or drainage from your surgical wound or a bad smell from the wound.    Lightheadedness, fainting or confusion.    Persistent vomiting (throwing up) and are not able to eat or drink for 12 hours.    Three or more loose, watery bowel movements in 24 hours (diarrhea).   Difficulty or pain while urinating( after non-urological surgery)    Pain and swelling in your legs, especially if it is only on one side.    Difficulty breathing or are breathing faster than normal.    Any new concerning symptoms.

## 2024-09-19 ENCOUNTER — ANESTHESIA (OUTPATIENT)
Dept: OPERATING ROOM | Facility: HOSPITAL | Age: 59
End: 2024-09-19
Payer: MEDICARE

## 2024-09-19 ENCOUNTER — ANESTHESIA EVENT (OUTPATIENT)
Dept: OPERATING ROOM | Facility: HOSPITAL | Age: 59
End: 2024-09-19
Payer: MEDICARE

## 2024-09-19 ENCOUNTER — HOSPITAL ENCOUNTER (OUTPATIENT)
Facility: HOSPITAL | Age: 59
Setting detail: OUTPATIENT SURGERY
Discharge: HOME | End: 2024-09-19
Attending: UROLOGY | Admitting: UROLOGY
Payer: MEDICARE

## 2024-09-19 ENCOUNTER — APPOINTMENT (OUTPATIENT)
Dept: RADIOLOGY | Facility: HOSPITAL | Age: 59
End: 2024-09-19
Payer: MEDICARE

## 2024-09-19 VITALS
OXYGEN SATURATION: 99 % | TEMPERATURE: 97.7 F | RESPIRATION RATE: 19 BRPM | SYSTOLIC BLOOD PRESSURE: 136 MMHG | HEART RATE: 73 BPM | DIASTOLIC BLOOD PRESSURE: 89 MMHG

## 2024-09-19 PROCEDURE — 3700000001 HC GENERAL ANESTHESIA TIME - INITIAL BASE CHARGE: Performed by: UROLOGY

## 2024-09-19 PROCEDURE — 7100000001 HC RECOVERY ROOM TIME - INITIAL BASE CHARGE: Performed by: UROLOGY

## 2024-09-19 PROCEDURE — A52310 PR CYSTOSCOPY,REMV CALCULUS,SIMPLE: Performed by: ANESTHESIOLOGY

## 2024-09-19 PROCEDURE — C2617 STENT, NON-COR, TEM W/O DEL: HCPCS | Performed by: UROLOGY

## 2024-09-19 PROCEDURE — 7100000009 HC PHASE TWO TIME - INITIAL BASE CHARGE: Performed by: UROLOGY

## 2024-09-19 PROCEDURE — 2500000004 HC RX 250 GENERAL PHARMACY W/ HCPCS (ALT 636 FOR OP/ED): Mod: JZ | Performed by: NURSE ANESTHETIST, CERTIFIED REGISTERED

## 2024-09-19 PROCEDURE — A52310 PR CYSTOSCOPY,REMV CALCULUS,SIMPLE: Performed by: NURSE ANESTHETIST, CERTIFIED REGISTERED

## 2024-09-19 PROCEDURE — 3600000003 HC OR TIME - INITIAL BASE CHARGE - PROCEDURE LEVEL THREE: Performed by: UROLOGY

## 2024-09-19 PROCEDURE — 3700000002 HC GENERAL ANESTHESIA TIME - EACH INCREMENTAL 1 MINUTE: Performed by: UROLOGY

## 2024-09-19 PROCEDURE — 2780000003 HC OR 278 NO HCPCS: Performed by: UROLOGY

## 2024-09-19 PROCEDURE — 2720000007 HC OR 272 NO HCPCS: Performed by: UROLOGY

## 2024-09-19 PROCEDURE — 7100000002 HC RECOVERY ROOM TIME - EACH INCREMENTAL 1 MINUTE: Performed by: UROLOGY

## 2024-09-19 PROCEDURE — 2500000005 HC RX 250 GENERAL PHARMACY W/O HCPCS: Performed by: NURSE ANESTHETIST, CERTIFIED REGISTERED

## 2024-09-19 PROCEDURE — 7100000010 HC PHASE TWO TIME - EACH INCREMENTAL 1 MINUTE: Performed by: UROLOGY

## 2024-09-19 PROCEDURE — 3600000008 HC OR TIME - EACH INCREMENTAL 1 MINUTE - PROCEDURE LEVEL THREE: Performed by: UROLOGY

## 2024-09-19 PROCEDURE — C1769 GUIDE WIRE: HCPCS | Performed by: UROLOGY

## 2024-09-19 RX ORDER — CEFAZOLIN SODIUM 2 G/100ML
INJECTION, SOLUTION INTRAVENOUS AS NEEDED
Status: DISCONTINUED | OUTPATIENT
Start: 2024-09-19 | End: 2024-09-19

## 2024-09-19 RX ORDER — HYDROMORPHONE HYDROCHLORIDE 0.2 MG/ML
0.2 INJECTION INTRAMUSCULAR; INTRAVENOUS; SUBCUTANEOUS EVERY 5 MIN PRN
Status: DISCONTINUED | OUTPATIENT
Start: 2024-09-19 | End: 2024-09-19 | Stop reason: HOSPADM

## 2024-09-19 RX ORDER — SODIUM CHLORIDE, SODIUM LACTATE, POTASSIUM CHLORIDE, CALCIUM CHLORIDE 600; 310; 30; 20 MG/100ML; MG/100ML; MG/100ML; MG/100ML
20 INJECTION, SOLUTION INTRAVENOUS CONTINUOUS
Status: CANCELLED | OUTPATIENT
Start: 2024-09-19

## 2024-09-19 RX ORDER — LIDOCAINE HYDROCHLORIDE 10 MG/ML
INJECTION, SOLUTION EPIDURAL; INFILTRATION; INTRACAUDAL; PERINEURAL AS NEEDED
Status: DISCONTINUED | OUTPATIENT
Start: 2024-09-19 | End: 2024-09-19

## 2024-09-19 RX ORDER — CEFAZOLIN SODIUM 2 G/100ML
2 INJECTION, SOLUTION INTRAVENOUS ONCE
Status: CANCELLED | OUTPATIENT
Start: 2024-09-19 | End: 2024-09-19

## 2024-09-19 RX ORDER — ONDANSETRON HYDROCHLORIDE 2 MG/ML
4 INJECTION, SOLUTION INTRAVENOUS ONCE AS NEEDED
Status: DISCONTINUED | OUTPATIENT
Start: 2024-09-19 | End: 2024-09-19 | Stop reason: HOSPADM

## 2024-09-19 RX ORDER — FENTANYL CITRATE 50 UG/ML
25 INJECTION, SOLUTION INTRAMUSCULAR; INTRAVENOUS EVERY 5 MIN PRN
Status: DISCONTINUED | OUTPATIENT
Start: 2024-09-19 | End: 2024-09-19 | Stop reason: HOSPADM

## 2024-09-19 RX ORDER — HYDRALAZINE HYDROCHLORIDE 20 MG/ML
5 INJECTION INTRAMUSCULAR; INTRAVENOUS EVERY 30 MIN PRN
Status: DISCONTINUED | OUTPATIENT
Start: 2024-09-19 | End: 2024-09-19 | Stop reason: HOSPADM

## 2024-09-19 RX ORDER — SODIUM CHLORIDE, SODIUM LACTATE, POTASSIUM CHLORIDE, CALCIUM CHLORIDE 600; 310; 30; 20 MG/100ML; MG/100ML; MG/100ML; MG/100ML
INJECTION, SOLUTION INTRAVENOUS CONTINUOUS PRN
Status: DISCONTINUED | OUTPATIENT
Start: 2024-09-19 | End: 2024-09-19

## 2024-09-19 RX ORDER — ALBUTEROL SULFATE 0.83 MG/ML
2.5 SOLUTION RESPIRATORY (INHALATION) ONCE AS NEEDED
Status: DISCONTINUED | OUTPATIENT
Start: 2024-09-19 | End: 2024-09-19 | Stop reason: HOSPADM

## 2024-09-19 RX ORDER — OXYCODONE HYDROCHLORIDE 5 MG/1
5 TABLET ORAL EVERY 4 HOURS PRN
Status: DISCONTINUED | OUTPATIENT
Start: 2024-09-19 | End: 2024-09-19 | Stop reason: HOSPADM

## 2024-09-19 RX ORDER — MEPERIDINE HYDROCHLORIDE 25 MG/ML
12.5 INJECTION INTRAMUSCULAR; INTRAVENOUS; SUBCUTANEOUS EVERY 10 MIN PRN
Status: DISCONTINUED | OUTPATIENT
Start: 2024-09-19 | End: 2024-09-19 | Stop reason: HOSPADM

## 2024-09-19 RX ORDER — PROPOFOL 10 MG/ML
INJECTION, EMULSION INTRAVENOUS AS NEEDED
Status: DISCONTINUED | OUTPATIENT
Start: 2024-09-19 | End: 2024-09-19

## 2024-09-19 RX ORDER — IPRATROPIUM BROMIDE 0.5 MG/2.5ML
500 SOLUTION RESPIRATORY (INHALATION) ONCE
Status: DISCONTINUED | OUTPATIENT
Start: 2024-09-19 | End: 2024-09-19 | Stop reason: HOSPADM

## 2024-09-19 RX ORDER — MIDAZOLAM HYDROCHLORIDE 1 MG/ML
INJECTION, SOLUTION INTRAMUSCULAR; INTRAVENOUS AS NEEDED
Status: DISCONTINUED | OUTPATIENT
Start: 2024-09-19 | End: 2024-09-19

## 2024-09-19 RX ORDER — ONDANSETRON HYDROCHLORIDE 2 MG/ML
INJECTION, SOLUTION INTRAVENOUS AS NEEDED
Status: DISCONTINUED | OUTPATIENT
Start: 2024-09-19 | End: 2024-09-19

## 2024-09-19 RX ORDER — FENTANYL CITRATE 50 UG/ML
INJECTION, SOLUTION INTRAMUSCULAR; INTRAVENOUS AS NEEDED
Status: DISCONTINUED | OUTPATIENT
Start: 2024-09-19 | End: 2024-09-19

## 2024-09-19 RX ORDER — DIPHENHYDRAMINE HYDROCHLORIDE 50 MG/ML
12.5 INJECTION INTRAMUSCULAR; INTRAVENOUS ONCE AS NEEDED
Status: DISCONTINUED | OUTPATIENT
Start: 2024-09-19 | End: 2024-09-19 | Stop reason: HOSPADM

## 2024-09-19 SDOH — HEALTH STABILITY: MENTAL HEALTH: CURRENT SMOKER: 0

## 2024-09-19 ASSESSMENT — PAIN SCALES - GENERAL
PAINLEVEL_OUTOF10: 0 - NO PAIN
PAINLEVEL_OUTOF10: 0 - NO PAIN
PAIN_LEVEL: 0
PAINLEVEL_OUTOF10: 0 - NO PAIN

## 2024-09-19 ASSESSMENT — PAIN - FUNCTIONAL ASSESSMENT
PAIN_FUNCTIONAL_ASSESSMENT: WONG-BAKER FACES
PAIN_FUNCTIONAL_ASSESSMENT: 0-10

## 2024-09-19 ASSESSMENT — COLUMBIA-SUICIDE SEVERITY RATING SCALE - C-SSRS
1. IN THE PAST MONTH, HAVE YOU WISHED YOU WERE DEAD OR WISHED YOU COULD GO TO SLEEP AND NOT WAKE UP?: NO
6. HAVE YOU EVER DONE ANYTHING, STARTED TO DO ANYTHING, OR PREPARED TO DO ANYTHING TO END YOUR LIFE?: NO
2. HAVE YOU ACTUALLY HAD ANY THOUGHTS OF KILLING YOURSELF?: NO

## 2024-09-19 NOTE — ANESTHESIA PREPROCEDURE EVALUATION
"Patient: Laurence Campbell \"Paula\"    Procedure Information       Date/Time: 09/19/24 1015    Procedures:       Cystoscopy with Removal Stent Ureter      Cystoscopy with Insertion Stent Ureter    Location: [facility] / [facility]    Surgeons: Akil Carbajal MD            Relevant Problems   Anesthesia (within normal limits)      Cardiac   (+) Benign essential HTN   (+) Hypertension      Pulmonary (within normal limits)      Neuro   (+) Cervical neuritis   (+) Intervertebral disc disorder with radiculopathy of lumbar region   (+) Lumbar neuritis      GI (within normal limits)      /Renal   (+) Recurrent UTI      Liver (within normal limits)      Endocrine   (+) Primary hyperparathyroidism (Multi)      Hematology   (+) Severe anemia      Musculoskeletal   (+) Intervertebral disc disorder with radiculopathy of lumbar region      HEENT (within normal limits)      ID   (+) HSV infection   (+) Recurrent UTI      Skin (within normal limits)      GYN (within normal limits)       Clinical information reviewed:    Allergies  Meds     OB Status         Allergies   Allergen Reactions    Alendronate Sodium Unknown    Alendronic Acid Unknown    Piperacillin-Tazobactam Other     Review of records 8/2023 patient tolerated IV Zosyn (pip-tazo) without side effects.     Sulfa (Sulfonamide Antibiotics) Hives and Unknown    Clindamycin Itching, Rash and Hives     Prior to Admission medications    Medication Sig Start Date End Date Taking? Authorizing Provider   acyclovir (Zovirax) 400 mg tablet Take 1 tablet (400 mg) by mouth 2 times a day. 7/3/24 10/1/24 Yes Maninder Yeager MD   alfuzosin (Uroxatral) 10 mg 24 hr tablet Take 1 tablet (10 mg) by mouth once daily. Do not crush, chew, or split.   Yes Historical Provider, MD   amLODIPine (Norvasc) 2.5 mg tablet Take 1 tablet (2.5 mg) by mouth once daily.   Yes Historical Provider, MD   calcium carbonate-vitamin D3 (Caltrate with Vitamin D3) 600 mg-20 mcg (800 unit) tablet Take " 1 tablet by mouth once daily.   Yes Historical Provider, MD   cholecalciferol (Vitamin D-3) 50 mcg (2,000 unit) capsule Take 2 capsules (100 mcg) by mouth once daily. 2/24/19  Yes Historical Provider, MD   cyclobenzaprine (Flexeril) 10 mg tablet Take 1 tablet (10 mg) by mouth 3 times a day for 180 doses.  Patient taking differently: Take 1 tablet (10 mg) by mouth 3 times a day as needed for muscle spasms. 5/31/24 9/19/24 Yes Maninder Yeager MD   denosumab (Prolia) 60 mg/mL syringe Physician's office to inject 60mg (1 prefilled syringe) beneath the skin once every 6 months. For office use only.  Patient not taking: Reported on 9/19/2024 1/9/24 9/1/24  Kamar Treadwell MD   docusate sodium (Colace) 100 mg capsule Take by mouth 2 times a day as needed for constipation. 11/4/19   Historical Provider, MD   multivitamin with minerals (multivitamin-iron-folic acid) tablet Take 1 tablet by mouth once daily.    Historical Provider, MD   polyethylene glycol (Glycolax, Miralax) 17 gram/dose powder Take 17 g by mouth once daily as needed. 11/4/19   Historical Provider, MD   fluticasone (Flonase) 50 mcg/actuation nasal spray Administer 1 spray into each nostril once daily. 1/12/24 9/18/24  Historical Provider, MD     Past Medical History:   Diagnosis Date    Abdominal pain, LLQ (left lower quadrant) 07/18/2023    Acute UTI 07/18/2023    Anemia     Constipation 07/18/2023    Hx of hyperparathyroidism     Hx of malignant neoplasm of vagina     Hypertension     Iron deficiency anemia     Left wrist pain 07/18/2023    Neurogenic bladder     Neuropathy     Osteoporosis     Recurrent UTI     Right shoulder pain 07/18/2023    Skin irritation 07/18/2023    Stiffness of right shoulder joint 07/18/2023    Vaginal adhesions     Vaginal cancer (Multi)      Past Surgical History:   Procedure Laterality Date    OTHER SURGICAL HISTORY  11/01/2017    Nephrectomy Right    PARATHYROID GLAND SURGERY      US GUIDED BIOPSY LYMPH NODE SUPERFICIAL   12/03/2018    US GUIDED BIOPSY LYMPH NODE SUPERFICIAL 12/3/2018 [facility]    VAGINAL MASS EXCISION  03/09/2015    Excision Of Vaginal Cyst Or Tumor         NPO Detail:  NPO/Void Status  Date of Last Liquid: 09/18/24  Time of Last Liquid: 2100  Date of Last Solid: 09/18/24  Time of Last Solid: 2000  Time of Last Void: 0950         Physical Exam    Airway  Mallampati: I  TM distance: >3 FB  Neck ROM: full     Cardiovascular    Dental - normal exam     Pulmonary    Abdominal            Anesthesia Plan    History of general anesthesia?: yes  History of complications of general anesthesia?: no    ASA 2     general     The patient is not a current smoker.  Patient was not previously instructed to abstain from smoking on day of procedure.  Patient did not smoke on day of procedure.  Education provided regarding risk of obstructive sleep apnea.  intravenous induction   Anesthetic plan and risks discussed with patient.    Plan discussed with CRNA.

## 2024-09-19 NOTE — ANESTHESIA PROCEDURE NOTES
Airway  Date/Time: 9/19/2024 12:28 PM  Urgency: elective    Airway not difficult    Staffing  Performed: CRNA   Authorized by: Rob Woody MD    Performed by: TOBY Patricio  Patient location during procedure: OR    Indications and Patient Condition  Indications for airway management: anesthesia  Spontaneous Ventilation: absent  Sedation level: deep  Preoxygenated: yes  Patient position: sniffing  MILS maintained throughout  Mask difficulty assessment: 1 - vent by mask    Final Airway Details  Final airway type: supraglottic airway      Successful airway: Size 4     Number of attempts at approach: 1

## 2024-09-19 NOTE — DISCHARGE INSTRUCTIONS
In case of emergency call the office at [phone].    If unable to reach the office call [phone].    Because you have had anesthesia you should not drive nor engage in any heavy activity for 24 hours.    If there are any signs of infection such as fever >101.5, redness or pus from an incision, or unexpected burning with urination, contact the doctor at [phone].    If you have pain beyond what is expected for the procedure call the doctor at [phone]

## 2024-09-19 NOTE — OP NOTE
"Cystoscopy with Removal Stent Ureter, Cystoscopy with Insertion Stent Ureter Operative Note     Date: 2024  OR Location: [facility]    Name: Laurence Campbell \"Jeremiah", : 1965, Age: 58 y.o., MRN: [id number], Sex: female    Diagnosis  Pre-op Diagnosis      * Hydronephrosis [N13.30] Post-op Diagnosis     * Hydronephrosis [N13.30]     Procedures  Cystoscopy with Removal Stent Ureter  37090 - [place] CYSTO W/SIMPLE REMOVAL STONE & STENT    Cystoscopy with Insertion Stent Ureter  36658 - [place] CYSTO W/INSERT URETERAL STENT      Surgeons      * Akil Carbajal - Primary    Resident/Fellow/Other Assistant:  Surgeons and Role:  * No surgeons found with a matching role *    Procedure Summary  Anesthesia: General  ASA: II  Anesthesia Staff: Anesthesiologist: Rob Woody MD  CRNA: TOBY Patricio  Estimated Blood Loss: 0 mL  Intra-op Medications: Administrations occurring from 1015 to 1100 on 24:  * No intraprocedure medications in log *           Anesthesia Record               Intraprocedure I/O Totals       None           Specimen: No specimens collected     Staff:   Circulator: Gerald  Scrub Person: Ilona         Drains and/or Catheters: * None in log *    Tourniquet Times:         Implants:     Findings: Stent was in good position    Indications: Paula Campbell is an 58 y.o. female who is having surgery for Hydronephrosis N13.30.     The patient was seen in the preoperative area. The risks, benefits, complications, treatment options, non-operative alternatives, expected recovery and outcomes were discussed with the patient. The possibilities of reaction to medication, pulmonary aspiration, injury to surrounding structures, bleeding, recurrent infection, the need for additional procedures, failure to diagnose a condition, and creating a complication requiring transfusion or operation were discussed with the patient. The patient concurred with the proposed plan, giving informed consent.  The site of " surgery was properly noted/marked if necessary per policy. The patient has been actively warmed in preoperative area. Preoperative antibiotics  Venous thrombosis prophylaxis have been ordered including bilateral sequential compression devices    Procedure Details: Patient was taken the operating room placed under general anesthesia.  She is then placed in the dorsolithotomy position and prepped and draped in sterile manner.  Patient underwent cystoscopy.  The stent was grasped and brought out to the urethral meatus.  A guidewire was placed up the stent and into the kidney.  A new 6 Swazi 26 cm double-J stent was placed with a good coil in the kidney and a good curl in the bladder.  The bladder was drained and the patient was awakened and taken to recovery room in stable condition.  Complications:  None; patient tolerated the procedure well.    Disposition: PACU - hemodynamically stable.  Condition: stable         Additional Details:       Attending Attestation:     Akil Carbajal  Phone Number: [phone]

## 2024-09-19 NOTE — ANESTHESIA POSTPROCEDURE EVALUATION
"Patient: Laurence Campbell \"Paula\"    Procedure Summary       Date: 09/19/24 Room / Location: [facility] / [facility]    Anesthesia Start: 1221 Anesthesia Stop: 1250    Procedures:       Cystoscopy with Removal Stent Ureter      Cystoscopy with Insertion Stent Ureter Diagnosis:       Hydronephrosis      (Hydronephrosis N13.30)    Surgeons: Akil Carbajal MD Responsible Provider: Rob Woody MD    Anesthesia Type: general ASA Status: 2            Anesthesia Type: general    Vitals Value Taken Time   /95 09/19/24 1320   Temp 36.6 °C (97.9 °F) 09/19/24 1320   Pulse 59 09/19/24 1320   Resp 16 09/19/24 1320   SpO2 100 % 09/19/24 1320       Anesthesia Post Evaluation    Patient location during evaluation: PACU  Patient participation: complete - patient participated  Level of consciousness: awake  Pain score: 0  Pain management: adequate  Multimodal analgesia pain management approach  Airway patency: patent  Two or more strategies used to mitigate risk of obstructive sleep apnea  Cardiovascular status: acceptable  Respiratory status: acceptable  Hydration status: acceptable  Postoperative Nausea and Vomiting: none        There were no known notable events for this encounter.    "

## 2024-09-24 ENCOUNTER — OFFICE VISIT (OUTPATIENT)
Dept: PRIMARY CARE | Facility: CLINIC | Age: 59
End: 2024-09-24
Payer: MEDICARE

## 2024-09-24 ENCOUNTER — APPOINTMENT (OUTPATIENT)
Dept: NEUROLOGY | Facility: CLINIC | Age: 59
End: 2024-09-24
Payer: MEDICARE

## 2024-09-24 VITALS
DIASTOLIC BLOOD PRESSURE: 84 MMHG | BODY MASS INDEX: 29.82 KG/M2 | HEIGHT: 67 IN | SYSTOLIC BLOOD PRESSURE: 138 MMHG | HEART RATE: 95 BPM | WEIGHT: 190 LBS | OXYGEN SATURATION: 99 %

## 2024-09-24 DIAGNOSIS — M81.8 OTHER OSTEOPOROSIS WITHOUT CURRENT PATHOLOGICAL FRACTURE: ICD-10-CM

## 2024-09-24 DIAGNOSIS — Z12.31 SCREENING MAMMOGRAM FOR BREAST CANCER: ICD-10-CM

## 2024-09-24 DIAGNOSIS — E89.2 STATUS POST PARATHYROIDECTOMY (CMS/HCC): ICD-10-CM

## 2024-09-24 DIAGNOSIS — I10 BENIGN ESSENTIAL HTN: Primary | ICD-10-CM

## 2024-09-24 DIAGNOSIS — R79.9 ABNORMAL FINDING OF BLOOD CHEMISTRY, UNSPECIFIED: ICD-10-CM

## 2024-09-24 DIAGNOSIS — E89.2 POSTPROCEDURAL HYPOPARATHYROIDISM (MULTI): ICD-10-CM

## 2024-09-24 DIAGNOSIS — E78.5 ELEVATED LIPIDS: ICD-10-CM

## 2024-09-24 DIAGNOSIS — E21.0 PRIMARY HYPERPARATHYROIDISM (MULTI): ICD-10-CM

## 2024-09-24 DIAGNOSIS — N89.5: ICD-10-CM

## 2024-09-24 DIAGNOSIS — E55.9 AVITAMINOSIS D: ICD-10-CM

## 2024-09-24 LAB — 25(OH)D3 SERPL-MCNC: 48 NG/ML (ref 30–100)

## 2024-09-24 PROCEDURE — 3008F BODY MASS INDEX DOCD: CPT

## 2024-09-24 PROCEDURE — 3079F DIAST BP 80-89 MM HG: CPT

## 2024-09-24 PROCEDURE — 3075F SYST BP GE 130 - 139MM HG: CPT

## 2024-09-24 PROCEDURE — 1036F TOBACCO NON-USER: CPT

## 2024-09-24 PROCEDURE — 99214 OFFICE O/P EST MOD 30 MIN: CPT

## 2024-09-24 RX ORDER — ACYCLOVIR 400 MG/1
400 TABLET ORAL 2 TIMES DAILY
Qty: 180 TABLET | Refills: 0 | Status: SHIPPED | OUTPATIENT
Start: 2024-09-24 | End: 2024-12-23

## 2024-09-24 RX ORDER — AMLODIPINE BESYLATE 5 MG/1
5 TABLET ORAL DAILY
Qty: 90 TABLET | Refills: 3 | Status: SHIPPED | OUTPATIENT
Start: 2024-09-24

## 2024-09-24 ASSESSMENT — ENCOUNTER SYMPTOMS
BACK PAIN: 0
SPEECH DIFFICULTY: 0
FATIGUE: 0
APPETITE CHANGE: 0
ANAL BLEEDING: 0
TROUBLE SWALLOWING: 0
HYPERACTIVE: 0
GASTROINTESTINAL NEGATIVE: 1
BRUISES/BLEEDS EASILY: 0
SEIZURES: 0
HEMATOLOGIC/LYMPHATIC NEGATIVE: 1
LIGHT-HEADEDNESS: 0
BLOOD IN STOOL: 0
CHEST TIGHTNESS: 0
PSYCHIATRIC NEGATIVE: 1
MYALGIAS: 0
DYSURIA: 0
DIARRHEA: 0
DIFFICULTY URINATING: 0
RHINORRHEA: 0
POLYPHAGIA: 0
DYSPHORIC MOOD: 0
LOSS OF SENSATION IN FEET: 0
EYES NEGATIVE: 1
JOINT SWELLING: 0
SORE THROAT: 0
WEAKNESS: 0
COLOR CHANGE: 0
NEUROLOGICAL NEGATIVE: 1
PHOTOPHOBIA: 0
NECK STIFFNESS: 0
COUGH: 0
WHEEZING: 0
DIZZINESS: 0
SLEEP DISTURBANCE: 0
STRIDOR: 0
CHILLS: 0
SHORTNESS OF BREATH: 0
POLYDIPSIA: 0
CARDIOVASCULAR NEGATIVE: 1
UNEXPECTED WEIGHT CHANGE: 0
MUSCULOSKELETAL NEGATIVE: 1
CONFUSION: 0
NUMBNESS: 0
FEVER: 0
APNEA: 0
FREQUENCY: 0
OCCASIONAL FEELINGS OF UNSTEADINESS: 0
VOICE CHANGE: 0
DEPRESSION: 0
FLANK PAIN: 0
NERVOUS/ANXIOUS: 0
HEADACHES: 0
EYE DISCHARGE: 0
PALPITATIONS: 0
CONSTITUTIONAL NEGATIVE: 1
ABDOMINAL PAIN: 0
RESPIRATORY NEGATIVE: 1
ABDOMINAL DISTENTION: 0
NECK PAIN: 0
TREMORS: 0
HEMATURIA: 0
AGITATION: 0
DIAPHORESIS: 0
CONSTIPATION: 0
ACTIVITY CHANGE: 0
SINUS PRESSURE: 0
RECTAL PAIN: 0
ENDOCRINE NEGATIVE: 1
NAUSEA: 0

## 2024-09-24 NOTE — PROGRESS NOTES
"Primary Care Provider: Kami Raygoza, DAYRON-CNP    Subjective   Laurence Campbell \"Jeremiah" is a 58 y.o. female who presents for No chief complaint on file..    HPI   PMH of HTN, chronic CYNTHIA, and Stage 3 vaginal cancer s/p chemoradiation (2004) with complications such as radiation cystitis, radiation proctitis, ureter obstruction requiring stents, and right nephrectomy- she developed a neurogenic bladder, necessitating intermittent cath and an indwelling meza; recurrent UTIs. She had some hospitalizations: 9/2023, she had a PSDA UTI and sepsis; and in 11/27-12/05/2023, she was hospitalized for severe anemia (HGB 3.8). Had a cystoscopy with removal of stent 9/19/24.     Mammogram due  Colonoscopy due 8/2027    HTN  BP above goal    Primary hyperparathyroidism  Hx postprocedural hypoparathyroidism  Had parathyroidectomy about 10 years ago    Review of Systems   Constitutional: Negative.  Negative for activity change, appetite change, chills, diaphoresis, fatigue, fever and unexpected weight change.   HENT: Negative.  Negative for congestion, dental problem, ear discharge, ear pain, hearing loss, mouth sores, nosebleeds, postnasal drip, rhinorrhea, sinus pressure, sneezing, sore throat, tinnitus, trouble swallowing and voice change.    Eyes: Negative.  Negative for photophobia, discharge and visual disturbance.   Respiratory: Negative.  Negative for apnea, cough, chest tightness, shortness of breath, wheezing and stridor.    Cardiovascular: Negative.  Negative for chest pain, palpitations and leg swelling.   Gastrointestinal: Negative.  Negative for abdominal distention, abdominal pain, anal bleeding, blood in stool, constipation, diarrhea, nausea and rectal pain.   Endocrine: Negative.  Negative for cold intolerance, heat intolerance, polydipsia, polyphagia and polyuria.   Genitourinary: Negative.  Negative for decreased urine volume, difficulty urinating, dysuria, flank pain, frequency, hematuria and urgency. " "  Musculoskeletal: Negative.  Negative for back pain, gait problem, joint swelling, myalgias, neck pain and neck stiffness.   Skin: Negative.  Negative for color change and rash.   Neurological: Negative.  Negative for dizziness, tremors, seizures, syncope, speech difficulty, weakness, light-headedness, numbness and headaches.   Hematological: Negative.  Does not bruise/bleed easily.   Psychiatric/Behavioral: Negative.  Negative for agitation, confusion, dysphoric mood, sleep disturbance and suicidal ideas. The patient is not nervous/anxious and is not hyperactive.    All other systems reviewed and are negative.        Objective   /84   Pulse 95   Ht 1.702 m (5' 7\")   Wt 86.2 kg (190 lb)   SpO2 99%   BMI 29.76 kg/m²     Physical Exam  Vitals reviewed.   Constitutional:       General: She is not in acute distress.     Appearance: Normal appearance. She is normal weight. She is not ill-appearing, toxic-appearing or diaphoretic.   HENT:      Head: Normocephalic and atraumatic.      Nose: Nose normal.   Eyes:      Conjunctiva/sclera: Conjunctivae normal.   Cardiovascular:      Rate and Rhythm: Normal rate and regular rhythm.      Pulses: Normal pulses.      Heart sounds: Normal heart sounds. No murmur heard.     No friction rub. No gallop.   Pulmonary:      Effort: Pulmonary effort is normal. No respiratory distress.      Breath sounds: Normal breath sounds.   Abdominal:      General: Abdomen is flat. Bowel sounds are normal.      Palpations: Abdomen is soft.   Musculoskeletal:         General: Normal range of motion.      Cervical back: Normal range of motion and neck supple.   Skin:     General: Skin is warm and dry.      Capillary Refill: Capillary refill takes less than 2 seconds.   Neurological:      General: No focal deficit present.      Mental Status: She is alert and oriented to person, place, and time. Mental status is at baseline.   Psychiatric:         Mood and Affect: Mood normal.         " Behavior: Behavior normal.         Thought Content: Thought content normal.         Judgment: Judgment normal.         Assessment/Plan   Problem List Items Addressed This Visit             ICD-10-CM    Acquired vaginal adhesions N89.5    Relevant Medications    acyclovir (Zovirax) 400 mg tablet    Benign essential HTN - Primary I10    Above goal  Increase amlodipine from 2.5mg to 5mg daily  Relevant Medications    amLODIPine (Norvasc) 5 mg tablet    Other osteoporosis without current pathological fracture M81.8    Relevant Orders    Vitamin D 25-Hydroxy,Total (for eval of Vitamin D levels)    Primary hyperparathyroidism (Multi) E21.0    Had parathyroidectomy about 10 years ago  Relevant Orders    TSH with reflex to Free T4 if abnormal    PTH, intact    Vitamin D 25-Hydroxy,Total (for eval of Vitamin D levels)    Postprocedural hypoparathyroidism (Multi) E89.2    Had parathyroidectomy about 10 years ago  Relevant Orders    TSH with reflex to Free T4 if abnormal    PTH, intact    Vitamin D 25-Hydroxy,Total (for eval of Vitamin D levels)    Avitaminosis D E55.9    Relevant Orders    Vitamin D 25-Hydroxy,Total (for eval of Vitamin D levels)    Screening mammogram for breast cancer Z12.31    Relevant Orders    BI mammo bilateral screening tomosynthesis       Follow up in 3 months or sooner if needed

## 2024-09-25 ENCOUNTER — LAB (OUTPATIENT)
Dept: LAB | Facility: LAB | Age: 59
End: 2024-09-25
Payer: MEDICARE

## 2024-09-25 DIAGNOSIS — E78.5 ELEVATED LIPIDS: ICD-10-CM

## 2024-09-25 DIAGNOSIS — E89.2 POSTPROCEDURAL HYPOPARATHYROIDISM (MULTI): ICD-10-CM

## 2024-09-25 DIAGNOSIS — E89.2 STATUS POST PARATHYROIDECTOMY (CMS/HCC): ICD-10-CM

## 2024-09-25 DIAGNOSIS — R79.9 ABNORMAL FINDING OF BLOOD CHEMISTRY, UNSPECIFIED: ICD-10-CM

## 2024-09-25 DIAGNOSIS — E21.0 PRIMARY HYPERPARATHYROIDISM (MULTI): ICD-10-CM

## 2024-09-25 DIAGNOSIS — I10 BENIGN ESSENTIAL HTN: ICD-10-CM

## 2024-09-25 LAB
CHOLEST SERPL-MCNC: 203 MG/DL (ref 0–199)
CHOLESTEROL/HDL RATIO: 3.7
EST. AVERAGE GLUCOSE BLD GHB EST-MCNC: 105 MG/DL
HBA1C MFR BLD: 5.3 %
HDLC SERPL-MCNC: 54.6 MG/DL
LDLC SERPL CALC-MCNC: 132 MG/DL
NON HDL CHOLESTEROL: 148 MG/DL (ref 0–149)
PTH-INTACT SERPL-MCNC: 51.2 PG/ML (ref 18.5–88)
TRIGL SERPL-MCNC: 84 MG/DL (ref 0–149)
TSH SERPL-ACNC: 1.15 MIU/L (ref 0.44–3.98)
VLDL: 17 MG/DL (ref 0–40)

## 2024-09-25 PROCEDURE — 83970 ASSAY OF PARATHORMONE: CPT

## 2024-09-25 PROCEDURE — 80061 LIPID PANEL: CPT

## 2024-09-25 PROCEDURE — 84443 ASSAY THYROID STIM HORMONE: CPT

## 2024-09-25 PROCEDURE — 83036 HEMOGLOBIN GLYCOSYLATED A1C: CPT

## 2024-09-25 PROCEDURE — 36415 COLL VENOUS BLD VENIPUNCTURE: CPT

## 2024-09-26 ENCOUNTER — HOSPITAL ENCOUNTER (OUTPATIENT)
Dept: RADIOLOGY | Facility: CLINIC | Age: 59
Discharge: HOME | End: 2024-09-26
Payer: MEDICARE

## 2024-09-26 VITALS — WEIGHT: 190.04 LBS | HEIGHT: 67 IN | BODY MASS INDEX: 29.83 KG/M2

## 2024-09-26 DIAGNOSIS — Z12.31 SCREENING MAMMOGRAM FOR BREAST CANCER: ICD-10-CM

## 2024-09-26 PROCEDURE — 77067 SCR MAMMO BI INCL CAD: CPT

## 2024-09-30 ENCOUNTER — APPOINTMENT (OUTPATIENT)
Dept: HEMATOLOGY/ONCOLOGY | Facility: CLINIC | Age: 59
End: 2024-09-30
Payer: MEDICARE

## 2024-09-30 ENCOUNTER — OFFICE VISIT (OUTPATIENT)
Dept: HEMATOLOGY/ONCOLOGY | Facility: CLINIC | Age: 59
End: 2024-09-30
Payer: MEDICARE

## 2024-09-30 VITALS
DIASTOLIC BLOOD PRESSURE: 93 MMHG | RESPIRATION RATE: 16 BRPM | SYSTOLIC BLOOD PRESSURE: 145 MMHG | OXYGEN SATURATION: 100 % | HEART RATE: 83 BPM | TEMPERATURE: 96.6 F | BODY MASS INDEX: 29.55 KG/M2 | WEIGHT: 188.71 LBS

## 2024-09-30 DIAGNOSIS — E53.8 VITAMIN B12 DEFICIENCY: ICD-10-CM

## 2024-09-30 DIAGNOSIS — D50.0 IRON DEFICIENCY ANEMIA DUE TO CHRONIC BLOOD LOSS: Primary | ICD-10-CM

## 2024-09-30 DIAGNOSIS — D64.9 ANEMIA, UNSPECIFIED TYPE: ICD-10-CM

## 2024-09-30 PROCEDURE — 3077F SYST BP >= 140 MM HG: CPT | Performed by: INTERNAL MEDICINE

## 2024-09-30 PROCEDURE — 3080F DIAST BP >= 90 MM HG: CPT | Performed by: INTERNAL MEDICINE

## 2024-09-30 PROCEDURE — 99215 OFFICE O/P EST HI 40 MIN: CPT | Performed by: INTERNAL MEDICINE

## 2024-09-30 RX ORDER — HEPARIN SODIUM,PORCINE/PF 10 UNIT/ML
50 SYRINGE (ML) INTRAVENOUS AS NEEDED
OUTPATIENT
Start: 2024-10-07

## 2024-09-30 RX ORDER — DIPHENHYDRAMINE HYDROCHLORIDE 50 MG/ML
50 INJECTION INTRAMUSCULAR; INTRAVENOUS AS NEEDED
OUTPATIENT
Start: 2024-10-07

## 2024-09-30 RX ORDER — ALBUTEROL SULFATE 0.83 MG/ML
3 SOLUTION RESPIRATORY (INHALATION) AS NEEDED
OUTPATIENT
Start: 2024-10-07

## 2024-09-30 RX ORDER — FAMOTIDINE 10 MG/ML
20 INJECTION INTRAVENOUS ONCE AS NEEDED
OUTPATIENT
Start: 2024-10-07

## 2024-09-30 RX ORDER — HEPARIN 100 UNIT/ML
500 SYRINGE INTRAVENOUS AS NEEDED
OUTPATIENT
Start: 2024-10-07

## 2024-09-30 RX ORDER — EPINEPHRINE 0.3 MG/.3ML
0.3 INJECTION SUBCUTANEOUS EVERY 5 MIN PRN
OUTPATIENT
Start: 2024-10-07

## 2024-09-30 RX ORDER — MAGNESIUM 200 MG
1 TABLET ORAL DAILY
Qty: 90 TABLET | Refills: 1 | Status: SHIPPED | OUTPATIENT
Start: 2024-09-30 | End: 2025-03-29

## 2024-09-30 ASSESSMENT — PAIN SCALES - GENERAL: PAINLEVEL: 0-NO PAIN

## 2024-09-30 NOTE — PROGRESS NOTES
Patient ID: Paula Campbell is a 58 y.o. female.  Reason for Referral: Anemia  Date of Initial Consult: June 24, 2024    Subjective:    On presentation today she reports she is doing well overall.  She denies any fevers, chills or night sweats.  No cough, chest pain or shortness of breath.  No nausea or vomiting.  No constipation or diarrhea.  Hematuria has resolved.  She no longer requires self-catheterization.  She does have continued lower extremity lymphedema left greater than right utilizes a cane for ambulation due to her lymphedema.     Oncologic History:  Vaginal Cancer 2004  Pelvic external beam radiation with concurrent Cisplatin chemotherapy from 4/9/04 - 5/14/04 followed by low dose brachytherapy given in two applications on 5/25/04 and 6/3/04    Radiation cystitis  Hyperbaric oxygen January - April 2024    History of Present Illness:  06/24/24: Patient presents for initial hematology consultation for anemia believed to be in the setting of radiation  cystitis. Patient was treated for vaginal cancer in 2004 treated with chemotherapy and radiation therapy.  She had experienced hematuria (with significant clotting). She underwent hyperbaric oxygen January - April 2024. Was requiring several catheterization due to neurogenic bladder this issue has resolved since receiving hyperbaric oxygen treatment.  She does continue to get frequent urinary tract infections and is followed by Dr. Carbajal.    Patient presented to the emergency room on November 27, 2023 for worsening hematuria and clotting.  She was found to have severe anemia at that time with a hemoglobin of 3.8.  She reports receiving 1 dose of IV iron while hospitalized.  She also received 6 units of packed red blood cells.  This prompted further investigation leading to eventual hyperbaric oxygen treatment.  Hematuria has resolved. She has been on oral iron since November 2023       Review of Systems:  A review of systems has been completed and are  negative for complaints except what is stated in the HPI and/or past medical history    Allergies:  Amoxicillin, penicillins, alendronic acid, sulfa, clindamycin,  pip-tazo    Medications:  Medication list reviewed with patient and updated in EMR    Past Medical History:  Osteoporosis, HTN    Past Surgical History:  Right nephrectomy 2018), gyn as above, left wrist     Health Maintenance  Colonoscopy  (follow up )  Mammogram  (due)    Family History:  Father - lung cancer (smoker)  Brother - Leukemia     Vital Signs:  BP (!) 145/93 (BP Location: Left arm, Patient Position: Sitting, BP Cuff Size: Adult long)   Pulse 83   Temp 35.9 °C (96.6 °F) (Temporal)   Resp 16   Wt 85.6 kg (188 lb 11.4 oz)   SpO2 100%   BMI 29.55 kg/m²     Physical Exam:  ECO  Pain: 0  Constitutional: Well developed, awake/alert/oriented x3, no distress, alert and cooperative  Eyes: PER. sclera anicteric  ENMT: Oral mucosa moist  Respiratory/Thorax: Breathing is non-labored. Lungs are clear to auscultation bilaterally. No adventitious breath sounds  Cardiovascular: S1-S2. Regular rate and rhythm. No murmurs, rubs, or gallops appreciated  Gastrointestinal: Abdomen soft nontender, nondistended, normal active bowel sounds.  Musculoskeletal: ROM intact, no joint swelling, normal strength  Extremities: normal extremities, no cyanosis, no clubbing.  Lower extremity edema left greater than right.  Neurologic: alert and oriented x3. Nonfocal exam. No myoclonus  Psychological: Pleasant, appropriate and easily engaged     Lab Results:  2023  WBC 7.8, hemoglobin 4.1, hematocrit 14.2, platelets 358,000  Repeat drawn laboratory 2023 hemoglobin 3.8, hematocrit 13.4    2023  Hemoglobin 6.6, hematocrit 20.2    2023  Iron 13, TIBC 322, percent saturation 4    2024  WBC 5.8, hemoglobin 10.9, hematocrit 35.3, MCV 79, platelets 354,000    May 31, 2024  Vitamin B12 388    Assessment/Plan:  1-  CYNTHIA:    Due to radiation cystitis causing hematuria. She is s/p IV iron in the hospital x1.     09/18/24 labs: iron def without anemia. But she has iron def related symptoms and could not tolerate oral iron.. will order Venofer 300 mg x 3 weekly     2- Vit b12 def: sublingual vit b12 every day is ordered for 6 months    RTC in 6 months with labs    Luis A Painter MD

## 2024-09-30 NOTE — PROGRESS NOTES
Patient here alone for New to Provider visit with Dr. Painter; seen for Anemia.  Has a history of vaginal cancer for which she had chemo and radiation.    Reconciled and reviewed medication and allergy list with patient.    Patient is noted to have lymphedema to left leg resulting for treatment for vaginal cancer.    Patient has no pain at this time.    Patient is eating well and without difficulty    Per MD, patient to have iron infusions X3 and return in March for follow up having labs drawn several days prior.    Reminded patient to check her My Chart for any updates to scheduling and to review medication list against what  has at home.  Also reviewed patient can obtain lab results on My Chart which will be reviewed at follow up visits.    No barriers to education noted, pt. Agreed to plan and verbalized understanding using teach back method

## 2024-10-01 ENCOUNTER — TELEPHONE (OUTPATIENT)
Dept: PRIMARY CARE | Facility: CLINIC | Age: 59
End: 2024-10-01
Payer: MEDICARE

## 2024-10-01 DIAGNOSIS — I10 BENIGN ESSENTIAL HTN: Primary | ICD-10-CM

## 2024-10-01 RX ORDER — HYDROCHLOROTHIAZIDE 12.5 MG/1
12.5 TABLET ORAL DAILY
Qty: 90 TABLET | Refills: 0 | Status: SHIPPED | OUTPATIENT
Start: 2024-10-01

## 2024-10-01 NOTE — TELEPHONE ENCOUNTER
Called patient and discussed. Amlodipine is worsening swelling in legs and BP is above goal. Change hydrochlorothiazide to Benicar if BP is still above goal; BMP will need to be rechecked. She will follow up in the next 6-8 weeks for an office visit.

## 2024-10-02 ENCOUNTER — SPECIALTY PHARMACY (OUTPATIENT)
Dept: PHARMACY | Facility: CLINIC | Age: 59
End: 2024-10-02

## 2024-10-09 ENCOUNTER — INFUSION (OUTPATIENT)
Dept: HEMATOLOGY/ONCOLOGY | Facility: CLINIC | Age: 59
End: 2024-10-09
Payer: MEDICARE

## 2024-10-09 VITALS
OXYGEN SATURATION: 100 % | SYSTOLIC BLOOD PRESSURE: 127 MMHG | RESPIRATION RATE: 16 BRPM | BODY MASS INDEX: 29.31 KG/M2 | DIASTOLIC BLOOD PRESSURE: 84 MMHG | TEMPERATURE: 97 F | HEART RATE: 84 BPM | WEIGHT: 187.17 LBS

## 2024-10-09 DIAGNOSIS — D50.0 IRON DEFICIENCY ANEMIA DUE TO CHRONIC BLOOD LOSS: ICD-10-CM

## 2024-10-09 PROCEDURE — 2500000004 HC RX 250 GENERAL PHARMACY W/ HCPCS (ALT 636 FOR OP/ED): Performed by: INTERNAL MEDICINE

## 2024-10-09 PROCEDURE — 96365 THER/PROPH/DIAG IV INF INIT: CPT | Mod: INF

## 2024-10-09 RX ORDER — FAMOTIDINE 10 MG/ML
20 INJECTION INTRAVENOUS ONCE AS NEEDED
Status: DISCONTINUED | OUTPATIENT
Start: 2024-10-09 | End: 2024-10-09 | Stop reason: HOSPADM

## 2024-10-09 RX ORDER — ALBUTEROL SULFATE 0.83 MG/ML
3 SOLUTION RESPIRATORY (INHALATION) AS NEEDED
OUTPATIENT
Start: 2024-10-14

## 2024-10-09 RX ORDER — ALBUTEROL SULFATE 0.83 MG/ML
3 SOLUTION RESPIRATORY (INHALATION) AS NEEDED
Status: DISCONTINUED | OUTPATIENT
Start: 2024-10-09 | End: 2024-10-09 | Stop reason: HOSPADM

## 2024-10-09 RX ORDER — EPINEPHRINE 0.3 MG/.3ML
0.3 INJECTION SUBCUTANEOUS EVERY 5 MIN PRN
Status: DISCONTINUED | OUTPATIENT
Start: 2024-10-09 | End: 2024-10-09 | Stop reason: HOSPADM

## 2024-10-09 RX ORDER — DIPHENHYDRAMINE HYDROCHLORIDE 50 MG/ML
50 INJECTION INTRAMUSCULAR; INTRAVENOUS AS NEEDED
OUTPATIENT
Start: 2024-10-14

## 2024-10-09 RX ORDER — DIPHENHYDRAMINE HYDROCHLORIDE 50 MG/ML
50 INJECTION INTRAMUSCULAR; INTRAVENOUS AS NEEDED
Status: DISCONTINUED | OUTPATIENT
Start: 2024-10-09 | End: 2024-10-09 | Stop reason: HOSPADM

## 2024-10-09 RX ORDER — EPINEPHRINE 0.3 MG/.3ML
0.3 INJECTION SUBCUTANEOUS EVERY 5 MIN PRN
OUTPATIENT
Start: 2024-10-14

## 2024-10-09 RX ORDER — FAMOTIDINE 10 MG/ML
20 INJECTION INTRAVENOUS ONCE AS NEEDED
OUTPATIENT
Start: 2024-10-14

## 2024-10-09 ASSESSMENT — PAIN SCALES - GENERAL: PAINLEVEL: 0-NO PAIN

## 2024-10-10 ENCOUNTER — LAB REQUISITION (OUTPATIENT)
Dept: LAB | Facility: HOSPITAL | Age: 59
End: 2024-10-10
Payer: MEDICARE

## 2024-10-10 DIAGNOSIS — R30.0 DYSURIA: ICD-10-CM

## 2024-10-10 PROCEDURE — 87086 URINE CULTURE/COLONY COUNT: CPT

## 2024-10-10 PROCEDURE — 87186 SC STD MICRODIL/AGAR DIL: CPT

## 2024-10-12 LAB — BACTERIA UR CULT: ABNORMAL

## 2024-10-15 DIAGNOSIS — D50.0 IRON DEFICIENCY ANEMIA DUE TO CHRONIC BLOOD LOSS: Primary | ICD-10-CM

## 2024-10-15 RX ORDER — FAMOTIDINE 10 MG/ML
20 INJECTION INTRAVENOUS ONCE AS NEEDED
Status: CANCELLED | OUTPATIENT
Start: 2024-10-21

## 2024-10-15 RX ORDER — DIPHENHYDRAMINE HYDROCHLORIDE 50 MG/ML
50 INJECTION INTRAMUSCULAR; INTRAVENOUS AS NEEDED
Status: CANCELLED | OUTPATIENT
Start: 2024-10-21

## 2024-10-15 RX ORDER — EPINEPHRINE 0.3 MG/.3ML
0.3 INJECTION SUBCUTANEOUS EVERY 5 MIN PRN
Status: CANCELLED | OUTPATIENT
Start: 2024-10-21

## 2024-10-15 RX ORDER — ALBUTEROL SULFATE 0.83 MG/ML
3 SOLUTION RESPIRATORY (INHALATION) AS NEEDED
Status: CANCELLED | OUTPATIENT
Start: 2024-10-21

## 2024-10-16 ENCOUNTER — INFUSION (OUTPATIENT)
Dept: HEMATOLOGY/ONCOLOGY | Facility: CLINIC | Age: 59
End: 2024-10-16
Payer: MEDICARE

## 2024-10-16 VITALS
SYSTOLIC BLOOD PRESSURE: 118 MMHG | BODY MASS INDEX: 29.72 KG/M2 | WEIGHT: 189.82 LBS | RESPIRATION RATE: 16 BRPM | OXYGEN SATURATION: 98 % | HEART RATE: 87 BPM | DIASTOLIC BLOOD PRESSURE: 76 MMHG | TEMPERATURE: 96.8 F

## 2024-10-16 DIAGNOSIS — D50.0 IRON DEFICIENCY ANEMIA DUE TO CHRONIC BLOOD LOSS: ICD-10-CM

## 2024-10-16 PROCEDURE — 2500000004 HC RX 250 GENERAL PHARMACY W/ HCPCS (ALT 636 FOR OP/ED): Performed by: INTERNAL MEDICINE

## 2024-10-16 PROCEDURE — 96374 THER/PROPH/DIAG INJ IV PUSH: CPT | Mod: INF

## 2024-10-16 RX ORDER — DIPHENHYDRAMINE HYDROCHLORIDE 50 MG/ML
50 INJECTION INTRAMUSCULAR; INTRAVENOUS AS NEEDED
OUTPATIENT
Start: 2024-10-19

## 2024-10-16 RX ORDER — ALBUTEROL SULFATE 0.83 MG/ML
3 SOLUTION RESPIRATORY (INHALATION) AS NEEDED
OUTPATIENT
Start: 2024-10-19

## 2024-10-16 RX ORDER — EPINEPHRINE 0.3 MG/.3ML
0.3 INJECTION SUBCUTANEOUS EVERY 5 MIN PRN
OUTPATIENT
Start: 2024-10-19

## 2024-10-16 RX ORDER — FAMOTIDINE 10 MG/ML
20 INJECTION INTRAVENOUS ONCE AS NEEDED
OUTPATIENT
Start: 2024-10-19

## 2024-10-16 ASSESSMENT — PAIN SCALES - GENERAL: PAINLEVEL_OUTOF10: 0-NO PAIN

## 2024-10-17 ENCOUNTER — TELEPHONE (OUTPATIENT)
Dept: PAIN MEDICINE | Facility: CLINIC | Age: 59
End: 2024-10-17
Payer: MEDICARE

## 2024-10-17 DIAGNOSIS — M54.16 LUMBAR RADICULOPATHY: Primary | ICD-10-CM

## 2024-10-23 ENCOUNTER — INFUSION (OUTPATIENT)
Dept: HEMATOLOGY/ONCOLOGY | Facility: CLINIC | Age: 59
End: 2024-10-23
Payer: MEDICARE

## 2024-10-23 VITALS
OXYGEN SATURATION: 98 % | WEIGHT: 189.04 LBS | TEMPERATURE: 97.5 F | DIASTOLIC BLOOD PRESSURE: 81 MMHG | BODY MASS INDEX: 29.6 KG/M2 | HEART RATE: 78 BPM | SYSTOLIC BLOOD PRESSURE: 122 MMHG | RESPIRATION RATE: 18 BRPM

## 2024-10-23 DIAGNOSIS — D50.0 IRON DEFICIENCY ANEMIA DUE TO CHRONIC BLOOD LOSS: ICD-10-CM

## 2024-10-23 LAB
ERYTHROCYTE [DISTWIDTH] IN BLOOD BY AUTOMATED COUNT: 15.9 % (ref 11.5–14.5)
FERRITIN SERPL-MCNC: 212 NG/ML (ref 8–150)
HCT VFR BLD AUTO: 39.4 % (ref 36–46)
HGB BLD-MCNC: 12.7 G/DL (ref 12–16)
IRON SATN MFR SERPL: 20 % (ref 25–45)
IRON SERPL-MCNC: 63 UG/DL (ref 35–150)
MCH RBC QN AUTO: 27.7 PG (ref 26–34)
MCHC RBC AUTO-ENTMCNC: 32.2 G/DL (ref 32–36)
MCV RBC AUTO: 86 FL (ref 80–100)
NRBC BLD-RTO: 0 /100 WBCS (ref 0–0)
PLATELET # BLD AUTO: 259 X10*3/UL (ref 150–450)
RBC # BLD AUTO: 4.58 X10*6/UL (ref 4–5.2)
TIBC SERPL-MCNC: 321 UG/DL (ref 240–445)
UIBC SERPL-MCNC: 258 UG/DL (ref 110–370)
WBC # BLD AUTO: 6.3 X10*3/UL (ref 4.4–11.3)

## 2024-10-23 PROCEDURE — 2500000004 HC RX 250 GENERAL PHARMACY W/ HCPCS (ALT 636 FOR OP/ED): Performed by: INTERNAL MEDICINE

## 2024-10-23 PROCEDURE — 83540 ASSAY OF IRON: CPT

## 2024-10-23 PROCEDURE — 85027 COMPLETE CBC AUTOMATED: CPT

## 2024-10-23 PROCEDURE — 82728 ASSAY OF FERRITIN: CPT

## 2024-10-23 PROCEDURE — 96374 THER/PROPH/DIAG INJ IV PUSH: CPT | Mod: INF

## 2024-10-23 RX ORDER — EPINEPHRINE 0.3 MG/.3ML
0.3 INJECTION SUBCUTANEOUS EVERY 5 MIN PRN
OUTPATIENT
Start: 2024-10-25

## 2024-10-23 RX ORDER — FAMOTIDINE 10 MG/ML
20 INJECTION INTRAVENOUS ONCE AS NEEDED
OUTPATIENT
Start: 2024-10-25

## 2024-10-23 RX ORDER — ALBUTEROL SULFATE 0.83 MG/ML
3 SOLUTION RESPIRATORY (INHALATION) AS NEEDED
Status: DISCONTINUED | OUTPATIENT
Start: 2024-10-23 | End: 2024-10-23 | Stop reason: HOSPADM

## 2024-10-23 RX ORDER — EPINEPHRINE 0.3 MG/.3ML
0.3 INJECTION SUBCUTANEOUS EVERY 5 MIN PRN
Status: DISCONTINUED | OUTPATIENT
Start: 2024-10-23 | End: 2024-10-23 | Stop reason: HOSPADM

## 2024-10-23 RX ORDER — ALBUTEROL SULFATE 0.83 MG/ML
3 SOLUTION RESPIRATORY (INHALATION) AS NEEDED
OUTPATIENT
Start: 2024-10-25

## 2024-10-23 RX ORDER — DIPHENHYDRAMINE HYDROCHLORIDE 50 MG/ML
50 INJECTION INTRAMUSCULAR; INTRAVENOUS AS NEEDED
OUTPATIENT
Start: 2024-10-25

## 2024-10-23 RX ORDER — DIPHENHYDRAMINE HYDROCHLORIDE 50 MG/ML
50 INJECTION INTRAMUSCULAR; INTRAVENOUS AS NEEDED
Status: DISCONTINUED | OUTPATIENT
Start: 2024-10-23 | End: 2024-10-23 | Stop reason: HOSPADM

## 2024-10-23 RX ORDER — FAMOTIDINE 10 MG/ML
20 INJECTION INTRAVENOUS ONCE AS NEEDED
Status: DISCONTINUED | OUTPATIENT
Start: 2024-10-23 | End: 2024-10-23 | Stop reason: HOSPADM

## 2024-10-23 ASSESSMENT — PAIN SCALES - GENERAL: PAINLEVEL_OUTOF10: 0-NO PAIN

## 2024-10-23 NOTE — PROGRESS NOTES
Pt tolerated IV iron well. Declined to stay for full 30min observation period, was willing to stay for 15min after iron though. Pt states she feels well with no questions or complaints upon discharge.

## 2024-10-30 ENCOUNTER — INFUSION (OUTPATIENT)
Dept: HEMATOLOGY/ONCOLOGY | Facility: CLINIC | Age: 59
End: 2024-10-30
Payer: MEDICARE

## 2024-10-30 VITALS
BODY MASS INDEX: 29.31 KG/M2 | OXYGEN SATURATION: 99 % | SYSTOLIC BLOOD PRESSURE: 126 MMHG | WEIGHT: 187.17 LBS | TEMPERATURE: 97.2 F | RESPIRATION RATE: 18 BRPM | HEART RATE: 70 BPM | DIASTOLIC BLOOD PRESSURE: 88 MMHG

## 2024-10-30 DIAGNOSIS — D50.0 IRON DEFICIENCY ANEMIA DUE TO CHRONIC BLOOD LOSS: ICD-10-CM

## 2024-10-30 PROCEDURE — 2500000004 HC RX 250 GENERAL PHARMACY W/ HCPCS (ALT 636 FOR OP/ED): Performed by: INTERNAL MEDICINE

## 2024-10-30 PROCEDURE — 96374 THER/PROPH/DIAG INJ IV PUSH: CPT | Mod: INF

## 2024-10-30 RX ORDER — EPINEPHRINE 0.3 MG/.3ML
0.3 INJECTION SUBCUTANEOUS EVERY 5 MIN PRN
OUTPATIENT
Start: 2024-11-01

## 2024-10-30 RX ORDER — DIPHENHYDRAMINE HYDROCHLORIDE 50 MG/ML
50 INJECTION INTRAMUSCULAR; INTRAVENOUS AS NEEDED
OUTPATIENT
Start: 2024-11-01

## 2024-10-30 RX ORDER — ALBUTEROL SULFATE 0.83 MG/ML
3 SOLUTION RESPIRATORY (INHALATION) AS NEEDED
OUTPATIENT
Start: 2024-11-01

## 2024-10-30 RX ORDER — FAMOTIDINE 10 MG/ML
20 INJECTION INTRAVENOUS ONCE AS NEEDED
OUTPATIENT
Start: 2024-11-01

## 2024-10-30 ASSESSMENT — PAIN SCALES - GENERAL: PAINLEVEL_OUTOF10: 0-NO PAIN

## 2024-11-01 ENCOUNTER — LAB REQUISITION (OUTPATIENT)
Dept: LAB | Facility: HOSPITAL | Age: 59
End: 2024-11-01
Payer: MEDICARE

## 2024-11-01 DIAGNOSIS — R30.0 DYSURIA: ICD-10-CM

## 2024-11-01 PROCEDURE — 87086 URINE CULTURE/COLONY COUNT: CPT

## 2024-11-03 ENCOUNTER — SPECIALTY PHARMACY (OUTPATIENT)
Dept: PHARMACY | Facility: CLINIC | Age: 59
End: 2024-11-03

## 2024-11-03 LAB — BACTERIA UR CULT: ABNORMAL

## 2024-11-04 LAB — BACTERIA UR CULT: ABNORMAL

## 2024-11-06 ENCOUNTER — APPOINTMENT (OUTPATIENT)
Dept: PAIN MEDICINE | Facility: CLINIC | Age: 59
End: 2024-11-06
Payer: MEDICARE

## 2024-11-06 ENCOUNTER — INFUSION (OUTPATIENT)
Dept: HEMATOLOGY/ONCOLOGY | Facility: CLINIC | Age: 59
End: 2024-11-06
Payer: MEDICARE

## 2024-11-06 VITALS
DIASTOLIC BLOOD PRESSURE: 71 MMHG | BODY MASS INDEX: 29.57 KG/M2 | TEMPERATURE: 97.3 F | OXYGEN SATURATION: 100 % | WEIGHT: 188.82 LBS | HEART RATE: 70 BPM | RESPIRATION RATE: 17 BRPM | SYSTOLIC BLOOD PRESSURE: 107 MMHG

## 2024-11-06 DIAGNOSIS — D50.0 IRON DEFICIENCY ANEMIA DUE TO CHRONIC BLOOD LOSS: ICD-10-CM

## 2024-11-06 PROCEDURE — 96374 THER/PROPH/DIAG INJ IV PUSH: CPT | Mod: INF

## 2024-11-06 PROCEDURE — 2500000004 HC RX 250 GENERAL PHARMACY W/ HCPCS (ALT 636 FOR OP/ED): Performed by: INTERNAL MEDICINE

## 2024-11-06 RX ORDER — ALBUTEROL SULFATE 0.83 MG/ML
3 SOLUTION RESPIRATORY (INHALATION) AS NEEDED
OUTPATIENT
Start: 2024-11-09

## 2024-11-06 RX ORDER — DIPHENHYDRAMINE HYDROCHLORIDE 50 MG/ML
50 INJECTION INTRAMUSCULAR; INTRAVENOUS AS NEEDED
OUTPATIENT
Start: 2024-11-09

## 2024-11-06 RX ORDER — ALBUTEROL SULFATE 0.83 MG/ML
3 SOLUTION RESPIRATORY (INHALATION) AS NEEDED
OUTPATIENT
Start: 2024-11-08

## 2024-11-06 RX ORDER — DIPHENHYDRAMINE HYDROCHLORIDE 50 MG/ML
50 INJECTION INTRAMUSCULAR; INTRAVENOUS AS NEEDED
OUTPATIENT
Start: 2024-11-08

## 2024-11-06 RX ORDER — EPINEPHRINE 0.3 MG/.3ML
0.3 INJECTION SUBCUTANEOUS EVERY 5 MIN PRN
OUTPATIENT
Start: 2024-11-08

## 2024-11-06 RX ORDER — EPINEPHRINE 0.3 MG/.3ML
0.3 INJECTION SUBCUTANEOUS EVERY 5 MIN PRN
OUTPATIENT
Start: 2024-11-09

## 2024-11-06 RX ORDER — FAMOTIDINE 10 MG/ML
20 INJECTION INTRAVENOUS ONCE AS NEEDED
OUTPATIENT
Start: 2024-11-08

## 2024-11-06 RX ORDER — FAMOTIDINE 10 MG/ML
20 INJECTION INTRAVENOUS ONCE AS NEEDED
OUTPATIENT
Start: 2024-11-09

## 2024-11-06 ASSESSMENT — PAIN SCALES - GENERAL: PAINLEVEL_OUTOF10: 0-NO PAIN

## 2024-11-08 ENCOUNTER — HOSPITAL ENCOUNTER (OUTPATIENT)
Dept: PAIN MEDICINE | Facility: CLINIC | Age: 59
Discharge: HOME | End: 2024-11-08
Payer: MEDICARE

## 2024-11-08 VITALS
OXYGEN SATURATION: 100 % | SYSTOLIC BLOOD PRESSURE: 138 MMHG | DIASTOLIC BLOOD PRESSURE: 87 MMHG | HEART RATE: 65 BPM | RESPIRATION RATE: 18 BRPM | TEMPERATURE: 97.2 F

## 2024-11-08 DIAGNOSIS — M54.16 LUMBAR RADICULOPATHY: ICD-10-CM

## 2024-11-08 PROCEDURE — 2500000004 HC RX 250 GENERAL PHARMACY W/ HCPCS (ALT 636 FOR OP/ED): Performed by: ANESTHESIOLOGY

## 2024-11-08 PROCEDURE — 2550000001 HC RX 255 CONTRASTS: Performed by: ANESTHESIOLOGY

## 2024-11-08 PROCEDURE — 64483 NJX AA&/STRD TFRM EPI L/S 1: CPT | Mod: 50 | Performed by: ANESTHESIOLOGY

## 2024-11-08 RX ORDER — NITROFURANTOIN 25; 75 MG/1; MG/1
CAPSULE ORAL 2 TIMES DAILY
COMMUNITY

## 2024-11-08 RX ORDER — LIDOCAINE HYDROCHLORIDE 10 MG/ML
INJECTION, SOLUTION EPIDURAL; INFILTRATION; INTRACAUDAL; PERINEURAL AS NEEDED
Status: COMPLETED | OUTPATIENT
Start: 2024-11-08 | End: 2024-11-08

## 2024-11-08 RX ORDER — DEXAMETHASONE SODIUM PHOSPHATE 10 MG/ML
INJECTION INTRAMUSCULAR; INTRAVENOUS AS NEEDED
Status: COMPLETED | OUTPATIENT
Start: 2024-11-08 | End: 2024-11-08

## 2024-11-08 ASSESSMENT — ENCOUNTER SYMPTOMS
DEPRESSION: 0
OCCASIONAL FEELINGS OF UNSTEADINESS: 1
FEVER: 0
LOSS OF SENSATION IN FEET: 0
SHORTNESS OF BREATH: 0

## 2024-11-08 ASSESSMENT — PAIN DESCRIPTION - DESCRIPTORS: DESCRIPTORS: ACHING;SORE

## 2024-11-08 ASSESSMENT — PAIN - FUNCTIONAL ASSESSMENT: PAIN_FUNCTIONAL_ASSESSMENT: 0-10

## 2024-11-08 ASSESSMENT — PAIN SCALES - GENERAL
PAINLEVEL_OUTOF10: 7
PAINLEVEL_OUTOF10: 0 - NO PAIN

## 2024-11-08 NOTE — H&P
"History Of Present Illness  Laurence Campbell \"Jeremiah" is a 58 y.o. female presenting for lumbar transforaminal epidural steroid injection     Past Medical History  Past Medical History:   Diagnosis Date    Abdominal pain, LLQ (left lower quadrant) 07/18/2023    Acute UTI 07/18/2023    Anemia     Constipation 07/18/2023    Hx of hyperparathyroidism     Hx of malignant neoplasm of vagina     Hypertension     Iron deficiency anemia     Left wrist pain 07/18/2023    Neurogenic bladder     Neuropathy     Osteoporosis     Recurrent UTI     Right shoulder pain 07/18/2023    Skin irritation 07/18/2023    Stiffness of right shoulder joint 07/18/2023    Vaginal adhesions     Vaginal cancer (Multi)        Surgical History  Past Surgical History:   Procedure Laterality Date    OTHER SURGICAL HISTORY  11/01/2017    Nephrectomy Right    PARATHYROID GLAND SURGERY      US GUIDED BIOPSY LYMPH NODE SUPERFICIAL  12/03/2018    US GUIDED BIOPSY LYMPH NODE SUPERFICIAL 12/3/2018 [facility]    VAGINAL MASS EXCISION  03/09/2015    Excision Of Vaginal Cyst Or Tumor        Social History  She reports that she has never smoked. She has been exposed to tobacco smoke. She has never used smokeless tobacco. She reports current alcohol use. She reports that she does not use drugs.    Family History  Family History   Problem Relation Name Age of Onset    Endometrial cancer Mother      Hypertension Mother      Obesity Mother      Lung cancer Father      Leukemia Brother      Other (Endometrial carcinoma) Mother          Allergies  Sulfa (sulfonamide antibiotics), Alendronate sodium, Alendronic acid, and Clindamycin    Review of Systems   Constitutional:  Negative for fever.   Respiratory:  Negative for shortness of breath.    Cardiovascular:  Negative for chest pain.   Psychiatric/Behavioral:  Negative for suicidal ideas.         Physical Exam  Cardiovascular:      Rate and Rhythm: Normal rate.   Pulmonary:      Effort: Pulmonary effort is " normal.   Neurological:      Mental Status: She is alert and oriented to person, place, and time.          Last Recorded Vitals  Blood pressure 138/87, pulse 65, temperature 36.2 °C (97.2 °F), temperature source Temporal, resp. rate 18, SpO2 100%.      Assessment/Plan       Patient does not have any contraindications for the planned procedure.      Iglesia Montanez MD

## 2024-11-11 NOTE — PROGRESS NOTES
Pt tolerated IV iron well. Stayed for full 30min observation period afterwards. Pt states she feels well and has no questions or complaints upon discharge. Schedule reviewed.   
no

## 2024-11-14 ENCOUNTER — SPECIALTY PHARMACY (OUTPATIENT)
Dept: PHARMACY | Facility: CLINIC | Age: 59
End: 2024-11-14

## 2024-11-21 ENCOUNTER — SPECIALTY PHARMACY (OUTPATIENT)
Dept: PHARMACY | Facility: CLINIC | Age: 59
End: 2024-11-21

## 2024-11-21 PROCEDURE — RXMED WILLOW AMBULATORY MEDICATION CHARGE

## 2024-11-26 ENCOUNTER — APPOINTMENT (OUTPATIENT)
Dept: PRIMARY CARE | Facility: CLINIC | Age: 59
End: 2024-11-26
Payer: MEDICARE

## 2024-11-26 VITALS
WEIGHT: 192 LBS | HEART RATE: 65 BPM | SYSTOLIC BLOOD PRESSURE: 122 MMHG | OXYGEN SATURATION: 98 % | DIASTOLIC BLOOD PRESSURE: 74 MMHG | BODY MASS INDEX: 30.13 KG/M2 | HEIGHT: 67 IN

## 2024-11-26 DIAGNOSIS — Z00.00 HEALTH CARE MAINTENANCE: ICD-10-CM

## 2024-11-26 DIAGNOSIS — C52 MALIGNANT NEOPLASM OF VAGINA (MULTI): ICD-10-CM

## 2024-11-26 DIAGNOSIS — I10 BENIGN ESSENTIAL HTN: ICD-10-CM

## 2024-11-26 DIAGNOSIS — E55.9 AVITAMINOSIS D: ICD-10-CM

## 2024-11-26 DIAGNOSIS — Z00.00 MEDICARE ANNUAL WELLNESS VISIT, SUBSEQUENT: Primary | ICD-10-CM

## 2024-11-26 DIAGNOSIS — M81.0 AGE-RELATED OSTEOPOROSIS WITHOUT CURRENT PATHOLOGICAL FRACTURE: ICD-10-CM

## 2024-11-26 DIAGNOSIS — I89.0 LYMPHEDEMA OF LEFT LEG: ICD-10-CM

## 2024-11-26 DIAGNOSIS — E21.0 PRIMARY HYPERPARATHYROIDISM (MULTI): ICD-10-CM

## 2024-11-26 DIAGNOSIS — E78.5 ELEVATED LIPIDS: ICD-10-CM

## 2024-11-26 ASSESSMENT — PATIENT HEALTH QUESTIONNAIRE - PHQ9
SUM OF ALL RESPONSES TO PHQ9 QUESTIONS 1 AND 2: 0
1. LITTLE INTEREST OR PLEASURE IN DOING THINGS: NOT AT ALL
2. FEELING DOWN, DEPRESSED OR HOPELESS: NOT AT ALL

## 2024-11-26 ASSESSMENT — ACTIVITIES OF DAILY LIVING (ADL)
DRESSING: INDEPENDENT
BATHING: INDEPENDENT
MANAGING_FINANCES: INDEPENDENT
DOING_HOUSEWORK: NEEDS ASSISTANCE
TAKING_MEDICATION: INDEPENDENT
GROCERY_SHOPPING: NEEDS ASSISTANCE

## 2024-11-26 ASSESSMENT — ENCOUNTER SYMPTOMS
LOSS OF SENSATION IN FEET: 0
DEPRESSION: 0
OCCASIONAL FEELINGS OF UNSTEADINESS: 1

## 2024-11-26 NOTE — PROGRESS NOTES
Primary Care Provider: DAYRON Shanks-SAIGE    Chief Complaint: Medicare Wellness Exam/Comprehensive Problem Focused Follow Up and Physical Exam    HPI:    FUV blood pressure & MWE & CPE    PMH of HTN, chronic CYNTHIA, and Stage 3 vaginal cancer s/p chemoradiation (2004) with complications such as radiation cystitis, radiation proctitis, ureter obstruction requiring stents, and right nephrectomy- she developed a neurogenic bladder, necessitating intermittent cath and an indwelling meza; recurrent UTIs. Left lower leg lymphedema-doing PT.    HTN- sometimes getting higher BP at home 140's    HM:  Mammogram last 9/26/24- repeat due 9/26/25  Colonoscopy due 8/2027  Healthcare POA- sister Katiana Novoa; alternate would be her mom Lily Campbell  Diet: well-balanced  Exercise: none  She got her influenza vaccine this year   Osteoporosis- following with Dr. Treadwell; last DEXA 12/15/23- due 12/2025      Primary hyperparathyroidism  Hx postprocedural hypoparathyroidism  Had parathyroidectomy about 10 years ago  Vit d def      Vaginal neoplasm- following with oncology     Elevated lipids        Active Problem List  Patient Active Problem List   Diagnosis    Acquired vaginal adhesions    Recurrent UTI    Benign essential HTN    Detrusor sphincter dyssynergia    HSV infection    Intervertebral disc disorder with radiculopathy of lumbar region    Malignant neoplasm of vagina (Multi)    Overactive bladder    Primary hyperparathyroidism (Multi)    Serum calcium elevated    Postprocedural hypoparathyroidism (Multi)    Urinary retention with incomplete bladder emptying    Voiding dysfunction    Avitaminosis D    Axillary lymphadenopathy    Cervical neuritis    Elevated lipids    Hair loss    Hypokalemia    Hypomagnesemia    Iron deficiency anemia    Lumbar neuritis    Lymphedema    Neurogenic bladder    Neuropathy    De Quervain's tenosynovitis    Severe anemia    Dysfunction of both eustachian tubes    Age-related  osteoporosis without current pathological fracture    Penicillin adverse reaction    Drug allergy    Health care maintenance       Comprehensive Medical/Surgical/Social/Family History  Past Medical History:   Diagnosis Date    Abdominal pain, LLQ (left lower quadrant) 07/18/2023    Acute UTI 07/18/2023    Anemia     Constipation 07/18/2023    Hx of hyperparathyroidism     Hx of malignant neoplasm of vagina     Hypertension     Iron deficiency anemia     Left wrist pain 07/18/2023    Neurogenic bladder     Neuropathy     Osteoporosis     Recurrent UTI     Right shoulder pain 07/18/2023    Skin irritation 07/18/2023    Stiffness of right shoulder joint 07/18/2023    Vaginal adhesions     Vaginal cancer (Multi)      Past Surgical History:   Procedure Laterality Date    OTHER SURGICAL HISTORY  11/01/2017    Nephrectomy Right    PARATHYROID GLAND SURGERY      US GUIDED BIOPSY LYMPH NODE SUPERFICIAL  12/03/2018    US GUIDED BIOPSY LYMPH NODE SUPERFICIAL 12/3/2018 [facility]    VAGINAL MASS EXCISION  03/09/2015    Excision Of Vaginal Cyst Or Tumor     Social History     Tobacco Use    Smoking status: Never     Passive exposure: Past    Smokeless tobacco: Never   Vaping Use    Vaping status: Never Used   Substance Use Topics    Alcohol use: Yes     Comment: social LESS THAN 1 X WEEK    Drug use: Never     Family History   Problem Relation Name Age of Onset    Endometrial cancer Mother      Hypertension Mother      Obesity Mother      Lung cancer Father      Leukemia Brother      Other (Endometrial carcinoma) Mother           Allergies and Medications  Sulfa (sulfonamide antibiotics), Alendronate sodium, Alendronic acid, and Clindamycin  Current Outpatient Medications on File Prior to Visit   Medication Sig Dispense Refill    acyclovir (Zovirax) 400 mg tablet Take 1 tablet (400 mg) by mouth 2 times a day. 180 tablet 0    alfuzosin (Uroxatral) 10 mg 24 hr tablet Take 1 tablet (10 mg) by mouth once daily. Do not crush,  chew, or split.      calcium carbonate-vitamin D3 (Caltrate with Vitamin D3) 600 mg-20 mcg (800 unit) tablet Take 1 tablet by mouth once daily.      cholecalciferol (Vitamin D-3) 50 mcg (2,000 unit) capsule Take 2 capsules (100 mcg) by mouth once daily.      cyanocobalamin, vitamin B-12, 1,000 mcg tablet, sublingual Place 1 tablet (1,000 mcg) under the tongue once daily. 90 tablet 1    cyclobenzaprine (Flexeril) 10 mg tablet Take 1 tablet (10 mg) by mouth 3 times a day for 180 doses. (Patient taking differently: Take 1 tablet (10 mg) by mouth 3 times a day as needed for muscle spasms.) 60 tablet 2    denosumab (Prolia) 60 mg/mL syringe Physician's office to inject 60mg (1 prefilled syringe) beneath the skin once every 6 months. For office use only. (Patient not taking: Reported on 9/19/2024) 1 mL 2    docusate sodium (Colace) 100 mg capsule Take by mouth 2 times a day as needed for constipation. (Patient not taking: Reported on 11/8/2024)      hydroCHLOROthiazide (Microzide) 12.5 mg tablet Take 1 tablet (12.5 mg) by mouth once daily. 90 tablet 0    multivitamin with minerals (multivitamin-iron-folic acid) tablet Take 1 tablet by mouth once daily.      nitrofurantoin, macrocrystal-monohydrate, (Macrobid) 100 mg capsule Take by mouth 2 times a day.      polyethylene glycol (Glycolax, Miralax) 17 gram/dose powder Mix 17 g of powder and drink once daily as needed.       Current Facility-Administered Medications on File Prior to Visit   Medication Dose Route Frequency Provider Last Rate Last Admin    denosumab (Prolia) injection 60 mg  60 mg subcutaneous q6 months Kamar Treadwell MD           Medications and Supplements  prescribed by me and other practitioners or clinical pharmacist (such as prescriptions, OTC's, herbal therapies and supplements) were reviewed and documented in the medical record.    Tobacco/Alcohol/Opioid use, as well as Illicit Drug Use was screened for/reviewed and documented in Social History  "section and medication list as appropriate       Advance directives  Advanced Care Planning (including a Living Will, Healthcare POA, as well as specific end of life choices and/or directives), was discussed with the patient and/or surrogate, voluntarily, and documented in the medical record.       ROS otherwise negative aside from what was mentioned above in HPI.    Vitals  /74   Pulse 65   Ht 1.702 m (5' 7\")   Wt 87.1 kg (192 lb)   SpO2 98%   BMI 30.07 kg/m²   Body mass index is 30.07 kg/m².  Physical Exam  Gen: Alert, NAD  HEENT:  PERRLA, EOMI, conjunctiva and sclera normal in appearance. External auditory canals/TMs normal; Oral cavity and posterior pharynx without lesions/exudate  Neck:  Supple with FROM; No masses/nodes palpable; Thyroid nontender and without nodules; No DE  Respiratory:  Lungs CTAB  Cardiovascular:  Heart RRR. No M/R/G. Peripheral pulses equal bilaterally  Abdomen:  Soft, nontender, BS present throughout; No R/G/R; No HSM or masses palpated  Extremities:  FROM all extremities; Muscle strength grossly normal with good tone  Neuro:  CN II-XII intact; Reflexes 2+/2+; Gross motor and sensory intact  Skin:  No suspicious lesions present    Assessment and Plan:  Problem List Items Addressed This Visit    MWE & CPE & follow up   Benign essential HTN  Stable  BP at goal; continue checking BP at goal, goal less than 130/80  Change hydrochlorothiazide to Benicar if BP is still above goal; BMP will need to be rechecked as well  Bring home BP cuff to next visit    Malignant neoplasm of vagina (Multi)  Stable  Continue following with oncology    Primary hyperparathyroidism (Multi)  Stable  Primary hyperparathyroidism  Hx postprocedural hypoparathyroidism  Had parathyroidectomy about 10 years ago    Avitaminosis D  Stable  Vit d level: 48 9/2024    Elevated lipids    Stable  Recheck labs  Consider adding statin  Lifestyle changes discussed  Relevant Orders    Lipid Panel    Age-related " osteoporosis without current pathological fracture  Stable   following with Dr. Treadwell; last DEXA 12/15/23- due 12/2025    Health care maintenance - Primary   HM:  Mammogram last 9/26/24- repeat due 9/26/25  Colonoscopy due 8/2027  Healthcare POA- sister Katiana Novoa; alternate would be her mom Lily Campbell  Diet: well-balanced  Exercise: none  She got her influenza vaccine this year   Osteoporosis- following with Dr. Treadwell; last DEXA 12/15/23- due 12/2025    Left lower leg lymphedema:  Stable  C/w PT  C/w wraps    During the course of the visit the patient was educated and counseled about age appropriate screening and preventive services. Completed preventive screenings were documented in the chart and orders were placed for outstanding screenings/procedures as documented in the Assessment and Plan.      Patient Instructions (the written plan) was given to the patient at check out.      Follow up in 3-4 months or sooner if needed    DAYRON Shanks-CNP

## 2024-12-03 ENCOUNTER — PHARMACY VISIT (OUTPATIENT)
Dept: PHARMACY | Facility: CLINIC | Age: 59
End: 2024-12-03
Payer: MEDICARE

## 2024-12-03 ENCOUNTER — PATIENT MESSAGE (OUTPATIENT)
Dept: ENDOCRINOLOGY | Facility: CLINIC | Age: 59
End: 2024-12-03
Payer: MEDICARE

## 2024-12-03 DIAGNOSIS — M81.8 OTHER OSTEOPOROSIS, UNSPECIFIED PATHOLOGICAL FRACTURE PRESENCE: ICD-10-CM

## 2024-12-03 DIAGNOSIS — M85.80 OSTEOPENIA, UNSPECIFIED LOCATION: ICD-10-CM

## 2024-12-03 DIAGNOSIS — E55.9 AVITAMINOSIS D: Primary | ICD-10-CM

## 2024-12-05 NOTE — PATIENT COMMUNICATION
Spoke with patient. Patient will receive prolia injection on 12/19/24, also will complete blood one week prior to injection.

## 2024-12-10 ENCOUNTER — LAB (OUTPATIENT)
Dept: LAB | Facility: LAB | Age: 59
End: 2024-12-10
Payer: MEDICARE

## 2024-12-10 DIAGNOSIS — M81.8 OTHER OSTEOPOROSIS, UNSPECIFIED PATHOLOGICAL FRACTURE PRESENCE: ICD-10-CM

## 2024-12-10 DIAGNOSIS — M85.80 OSTEOPENIA, UNSPECIFIED LOCATION: ICD-10-CM

## 2024-12-10 DIAGNOSIS — E55.9 AVITAMINOSIS D: ICD-10-CM

## 2024-12-10 LAB
25(OH)D3 SERPL-MCNC: 54 NG/ML (ref 30–100)
ALBUMIN SERPL BCP-MCNC: 4 G/DL (ref 3.4–5)
ALP SERPL-CCNC: 59 U/L (ref 33–110)
ALT SERPL W P-5'-P-CCNC: 10 U/L (ref 7–45)
ANION GAP SERPL CALC-SCNC: 15 MMOL/L (ref 10–20)
AST SERPL W P-5'-P-CCNC: 16 U/L (ref 9–39)
BILIRUB SERPL-MCNC: 0.6 MG/DL (ref 0–1.2)
BUN SERPL-MCNC: 18 MG/DL (ref 6–23)
CALCIUM SERPL-MCNC: 9.6 MG/DL (ref 8.6–10.6)
CHLORIDE SERPL-SCNC: 106 MMOL/L (ref 98–107)
CO2 SERPL-SCNC: 24 MMOL/L (ref 21–32)
CREAT SERPL-MCNC: 0.99 MG/DL (ref 0.5–1.05)
EGFRCR SERPLBLD CKD-EPI 2021: 66 ML/MIN/1.73M*2
GLUCOSE SERPL-MCNC: 100 MG/DL (ref 74–99)
POTASSIUM SERPL-SCNC: 3.7 MMOL/L (ref 3.5–5.3)
PROT SERPL-MCNC: 6.9 G/DL (ref 6.4–8.2)
SODIUM SERPL-SCNC: 141 MMOL/L (ref 136–145)

## 2024-12-10 PROCEDURE — 82306 VITAMIN D 25 HYDROXY: CPT

## 2024-12-10 PROCEDURE — 36415 COLL VENOUS BLD VENIPUNCTURE: CPT

## 2024-12-10 PROCEDURE — 80053 COMPREHEN METABOLIC PANEL: CPT

## 2024-12-17 ENCOUNTER — LAB REQUISITION (OUTPATIENT)
Dept: LAB | Facility: HOSPITAL | Age: 59
End: 2024-12-17
Payer: MEDICARE

## 2024-12-17 DIAGNOSIS — R82.81 PYURIA: ICD-10-CM

## 2024-12-17 PROCEDURE — 87086 URINE CULTURE/COLONY COUNT: CPT

## 2024-12-17 PROCEDURE — 87186 SC STD MICRODIL/AGAR DIL: CPT

## 2024-12-19 ENCOUNTER — OFFICE VISIT (OUTPATIENT)
Dept: GYNECOLOGIC ONCOLOGY | Facility: CLINIC | Age: 59
End: 2024-12-19
Payer: MEDICARE

## 2024-12-19 ENCOUNTER — TELEPHONE (OUTPATIENT)
Dept: ENDOCRINOLOGY | Facility: CLINIC | Age: 59
End: 2024-12-19

## 2024-12-19 ENCOUNTER — APPOINTMENT (OUTPATIENT)
Dept: ENDOCRINOLOGY | Facility: CLINIC | Age: 59
End: 2024-12-19
Payer: MEDICARE

## 2024-12-19 VITALS
BODY MASS INDEX: 29.6 KG/M2 | OXYGEN SATURATION: 99 % | WEIGHT: 189 LBS | DIASTOLIC BLOOD PRESSURE: 87 MMHG | RESPIRATION RATE: 18 BRPM | TEMPERATURE: 98.4 F | HEART RATE: 74 BPM | SYSTOLIC BLOOD PRESSURE: 129 MMHG

## 2024-12-19 DIAGNOSIS — C52 MALIGNANT NEOPLASM OF VAGINA (MULTI): Primary | ICD-10-CM

## 2024-12-19 PROCEDURE — 3079F DIAST BP 80-89 MM HG: CPT | Performed by: NURSE PRACTITIONER

## 2024-12-19 PROCEDURE — G2211 COMPLEX E/M VISIT ADD ON: HCPCS | Performed by: NURSE PRACTITIONER

## 2024-12-19 PROCEDURE — 99214 OFFICE O/P EST MOD 30 MIN: CPT | Performed by: NURSE PRACTITIONER

## 2024-12-19 PROCEDURE — 3074F SYST BP LT 130 MM HG: CPT | Performed by: NURSE PRACTITIONER

## 2024-12-19 ASSESSMENT — PAIN SCALES - GENERAL: PAINLEVEL_OUTOF10: 0-NO PAIN

## 2024-12-19 NOTE — PROGRESS NOTES
Patient ID: Paula Campbell is a 59 y.o. female.    Subjective    HPI    48 yo with history of Stage III Vaginal Cancer presents for a cancer surveillance visit. She was diagnosed in 2004 and treated with chemoradiation. She has been disease free since then. She developed radiation proctitis, radiation cystitis, and LLE Lymphedema after her treatments. She uses a cane to assist with ambulation due to her lymphedema.     Radiation History: Pelvic external beam radiation with concurrent Cisplatin chemotherapy from 4/9/04 - 5/14/04 followed by low dose brachytherapy given in two applications on 5/25/04 and 6/3/04    Hospitalized 7/5/23 - 7/11/23 with hydronephrosis and hematuria consistent with radiation cystitis     Hospitalized 11/27/23 - 12/5/23 with severe anemia (Hgb 3.8) due to radiation cystitis, bladder irrigation was initiated and was treated for UTI. Transfused with multiple units of RBCs. Arrangements were made by urology for the patient to follow-up at [facility] for ongoing treatment for the bleeding pathology.    Interval History: Here for pap. Occasional constipation, taking colace as needed. Patient sees urology regularly for history of radiation cystitis, s/p stent placement in September. She is no longer having hematuria and no longer needs to self cath. Patient not been sexually active. Patient has baseline LLE lymphedema. Last pap 12/2023 negative, HPV -. Lost a significant amount of weight while hospitalized over the last 6 months. Has not had any vaginal bleeding. Denies any other new medical problems since last visit.     Objective    BSA: 2.01 meters squared  /87 (BP Location: Left arm, Patient Position: Sitting, BP Cuff Size: Adult)   Pulse 74   Temp 36.9 °C (98.4 °F) (Core)   Resp 18   Wt 85.7 kg (189 lb)   SpO2 99%   BMI 29.60 kg/m²      Physical Exam  Vitals and nursing note reviewed.   Constitutional:       Appearance: Normal appearance. She is normal weight.    HENT:      Mouth/Throat:      Mouth: Mucous membranes are moist.      Pharynx: Oropharynx is clear.   Eyes:      Conjunctiva/sclera: Conjunctivae normal.      Pupils: Pupils are equal, round, and reactive to light.   Cardiovascular:      Rate and Rhythm: Normal rate and regular rhythm.   Pulmonary:      Effort: Pulmonary effort is normal.      Breath sounds: Normal breath sounds.   Abdominal:      General: Abdomen is flat. There is no distension.      Palpations: Abdomen is soft. There is no mass.      Tenderness: There is no abdominal tenderness.   Genitourinary:     General: Normal vulva.      Vagina: Normal.      Uterus: Absent.       Comments: Vaginal vault stenotic and shortened,  about 2 inches, radiation changes to tissue, pediatric speculum used to examine patient, walls bleed easily with exam, no lesions noted, smooth vaginal walls.  Musculoskeletal:         General: Normal range of motion.   Lymphadenopathy:      Comments: Chronic stable lymphedema   Skin:     General: Skin is warm and dry.   Neurological:      Mental Status: She is alert.   Psychiatric:         Mood and Affect: Mood normal.         Behavior: Behavior normal.         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 JOSUE 19 years.    Plan:    Pap today, f/u results - last several have been inconclusive, HPV -     If negative, f/u in 1 year or as needed     Continue follow up with urology for radiation cystitis       Rachel Virgen, APRN-CNP

## 2024-12-19 NOTE — TELEPHONE ENCOUNTER
Client came into clinic today, received her Prolia injection. Into the back of her left arm. Tolerated well, no c/o pain or discomfort.

## 2024-12-20 ENCOUNTER — APPOINTMENT (OUTPATIENT)
Dept: PAIN MEDICINE | Facility: CLINIC | Age: 59
End: 2024-12-20
Payer: MEDICARE

## 2024-12-20 DIAGNOSIS — N89.5: ICD-10-CM

## 2024-12-20 LAB — BACTERIA UR CULT: ABNORMAL

## 2024-12-20 RX ORDER — ACYCLOVIR 400 MG/1
400 TABLET ORAL 2 TIMES DAILY
Qty: 180 TABLET | Refills: 0 | Status: SHIPPED | OUTPATIENT
Start: 2024-12-20

## 2024-12-27 DIAGNOSIS — I10 BENIGN ESSENTIAL HTN: ICD-10-CM

## 2024-12-27 RX ORDER — HYDROCHLOROTHIAZIDE 12.5 MG/1
12.5 TABLET ORAL DAILY
Qty: 90 TABLET | Refills: 0 | Status: SHIPPED | OUTPATIENT
Start: 2024-12-27

## 2024-12-31 ENCOUNTER — TELEPHONE (OUTPATIENT)
Dept: ENDOCRINOLOGY | Facility: CLINIC | Age: 59
End: 2024-12-31

## 2024-12-31 NOTE — TELEPHONE ENCOUNTER
Prior Auth for prolia pending determination  Submitted on 12/31 via [facility]    KEY: [id number]

## 2025-01-02 ENCOUNTER — LAB (OUTPATIENT)
Dept: LAB | Facility: LAB | Age: 60
End: 2025-01-02
Payer: MEDICARE

## 2025-01-02 DIAGNOSIS — R30.0 DYSURIA: Primary | ICD-10-CM

## 2025-01-02 LAB
APPEARANCE UR: ABNORMAL
BILIRUB UR STRIP.AUTO-MCNC: NEGATIVE MG/DL
COLOR UR: YELLOW
GLUCOSE UR STRIP.AUTO-MCNC: NORMAL MG/DL
KETONES UR STRIP.AUTO-MCNC: NEGATIVE MG/DL
LEUKOCYTE ESTERASE UR QL STRIP.AUTO: ABNORMAL
MUCOUS THREADS #/AREA URNS AUTO: ABNORMAL /LPF
NITRITE UR QL STRIP.AUTO: NEGATIVE
PH UR STRIP.AUTO: 6 [PH]
PROT UR STRIP.AUTO-MCNC: ABNORMAL MG/DL
RBC # UR STRIP.AUTO: ABNORMAL /UL
RBC #/AREA URNS AUTO: >20 /HPF
SP GR UR STRIP.AUTO: 1.02
UROBILINOGEN UR STRIP.AUTO-MCNC: NORMAL MG/DL
WBC #/AREA URNS AUTO: >50 /HPF

## 2025-01-02 PROCEDURE — 81001 URINALYSIS AUTO W/SCOPE: CPT

## 2025-01-02 PROCEDURE — 87086 URINE CULTURE/COLONY COUNT: CPT

## 2025-01-03 ENCOUNTER — APPOINTMENT (OUTPATIENT)
Dept: PAIN MEDICINE | Facility: CLINIC | Age: 60
End: 2025-01-03
Payer: MEDICARE

## 2025-01-03 LAB
BACTERIA UR CULT: NORMAL
CYTOLOGY CMNT CVX/VAG CYTO-IMP: NORMAL
HPV HR 12 DNA GENITAL QL NAA+PROBE: NEGATIVE
HPV HR GENOTYPES PNL CVX NAA+PROBE: NEGATIVE
HPV16 DNA SPEC QL NAA+PROBE: NEGATIVE
HPV18 DNA SPEC QL NAA+PROBE: NEGATIVE
LAB AP HPV GENOTYPE QUESTION: YES
LAB AP HPV HR: NORMAL
LABORATORY COMMENT REPORT: NORMAL
LABORATORY COMMENT REPORT: NORMAL
MENSTRUAL HX REPORTED: NORMAL
PATH REPORT.RELEVANT HX SPEC: NORMAL
PATH REPORT.TOTAL CANCER: NORMAL

## 2025-01-08 ENCOUNTER — APPOINTMENT (OUTPATIENT)
Dept: PAIN MEDICINE | Facility: CLINIC | Age: 60
End: 2025-01-08
Payer: MEDICARE

## 2025-01-08 ENCOUNTER — TELEPHONE (OUTPATIENT)
Dept: GYNECOLOGIC ONCOLOGY | Facility: HOSPITAL | Age: 60
End: 2025-01-08

## 2025-01-08 VITALS
DIASTOLIC BLOOD PRESSURE: 93 MMHG | OXYGEN SATURATION: 99 % | HEIGHT: 67 IN | HEART RATE: 85 BPM | WEIGHT: 192 LBS | SYSTOLIC BLOOD PRESSURE: 137 MMHG | BODY MASS INDEX: 30.13 KG/M2 | TEMPERATURE: 97 F | RESPIRATION RATE: 18 BRPM

## 2025-01-08 DIAGNOSIS — M54.16 LUMBAR RADICULOPATHY: Primary | ICD-10-CM

## 2025-01-08 PROCEDURE — 3075F SYST BP GE 130 - 139MM HG: CPT | Performed by: ANESTHESIOLOGY

## 2025-01-08 PROCEDURE — 99213 OFFICE O/P EST LOW 20 MIN: CPT | Performed by: ANESTHESIOLOGY

## 2025-01-08 PROCEDURE — 3080F DIAST BP >= 90 MM HG: CPT | Performed by: ANESTHESIOLOGY

## 2025-01-08 PROCEDURE — 3008F BODY MASS INDEX DOCD: CPT | Performed by: ANESTHESIOLOGY

## 2025-01-08 PROCEDURE — 1036F TOBACCO NON-USER: CPT | Performed by: ANESTHESIOLOGY

## 2025-01-08 ASSESSMENT — ENCOUNTER SYMPTOMS
FEVER: 0
LOSS OF SENSATION IN FEET: 1
BLOOD IN STOOL: 0
SHORTNESS OF BREATH: 0
DEPRESSION: 0
BACK PAIN: 1
OCCASIONAL FEELINGS OF UNSTEADINESS: 1

## 2025-01-08 ASSESSMENT — PAIN SCALES - GENERAL
PAINLEVEL_OUTOF10: 6
PAINLEVEL_OUTOF10: 6

## 2025-01-08 ASSESSMENT — COLUMBIA-SUICIDE SEVERITY RATING SCALE - C-SSRS
1. IN THE PAST MONTH, HAVE YOU WISHED YOU WERE DEAD OR WISHED YOU COULD GO TO SLEEP AND NOT WAKE UP?: NO
2. HAVE YOU ACTUALLY HAD ANY THOUGHTS OF KILLING YOURSELF?: NO
6. HAVE YOU EVER DONE ANYTHING, STARTED TO DO ANYTHING, OR PREPARED TO DO ANYTHING TO END YOUR LIFE?: NO

## 2025-01-08 ASSESSMENT — PAIN - FUNCTIONAL ASSESSMENT: PAIN_FUNCTIONAL_ASSESSMENT: 0-10

## 2025-01-08 ASSESSMENT — PAIN DESCRIPTION - DESCRIPTORS: DESCRIPTORS: ACHING
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
## 2025-01-08 NOTE — TELEPHONE ENCOUNTER
I spoke with the patient and told her that her pap was negative per her NP and she can follow up in one year as scheduled. She verbalized her understanding and did not have any questions.

## 2025-01-09 ENCOUNTER — APPOINTMENT (OUTPATIENT)
Dept: ENDOCRINOLOGY | Facility: CLINIC | Age: 60
End: 2025-01-09
Payer: MEDICARE

## 2025-01-09 VITALS
WEIGHT: 192.4 LBS | SYSTOLIC BLOOD PRESSURE: 138 MMHG | HEART RATE: 79 BPM | HEIGHT: 67 IN | DIASTOLIC BLOOD PRESSURE: 85 MMHG | BODY MASS INDEX: 30.2 KG/M2 | RESPIRATION RATE: 18 BRPM | TEMPERATURE: 97.9 F

## 2025-01-09 DIAGNOSIS — E55.9 AVITAMINOSIS D: ICD-10-CM

## 2025-01-09 DIAGNOSIS — M81.8 OTHER OSTEOPOROSIS, UNSPECIFIED PATHOLOGICAL FRACTURE PRESENCE: Primary | ICD-10-CM

## 2025-01-09 PROCEDURE — 3075F SYST BP GE 130 - 139MM HG: CPT | Performed by: INTERNAL MEDICINE

## 2025-01-09 PROCEDURE — 99214 OFFICE O/P EST MOD 30 MIN: CPT | Performed by: INTERNAL MEDICINE

## 2025-01-09 PROCEDURE — 3008F BODY MASS INDEX DOCD: CPT | Performed by: INTERNAL MEDICINE

## 2025-01-09 PROCEDURE — 1036F TOBACCO NON-USER: CPT | Performed by: INTERNAL MEDICINE

## 2025-01-09 PROCEDURE — 3079F DIAST BP 80-89 MM HG: CPT | Performed by: INTERNAL MEDICINE

## 2025-01-09 ASSESSMENT — PATIENT HEALTH QUESTIONNAIRE - PHQ9
1. LITTLE INTEREST OR PLEASURE IN DOING THINGS: NOT AT ALL
SUM OF ALL RESPONSES TO PHQ9 QUESTIONS 1 AND 2: 0
2. FEELING DOWN, DEPRESSED OR HOPELESS: NOT AT ALL

## 2025-01-09 NOTE — PROGRESS NOTES
"Consults    Reason For Consult  Osteoporosis     History Of Present Illness  Laurence Campbell \"Jeremiah" is a 59 y.o. female presenting with osteoporosis .      since last visit a year ago she had no fractures  She sees me for her osteoporosis her last Prolia treatment was December 2024  Since last visit she has been hospitalized multiple times for urinary tract infection and bladder cystitis and blood clots in the bladder from radiation induced damage she has been treated with antibiotics as well as hyperbaric oxygen therapy  For her bones she takes 2000 international units vitamin D 2 a day and a minute Caltrate calcium pills 1 a day  She is also getting Prolia  Her calcium and vitamin D levels were checked her creatinine was stable in December 2024     She self cath, she had UTI infections, and clots  Her osteoporosis is from primary hyperparathyroidism for which she had parathyroidectomy  She did have a reaction to alendronate  Since February 2020 she has been tolerating Prolia      FINDINGS:  SPINE L1-L4  Bone Mineral Density: 0.744  T-Score -3.5  Z-Score -3.6      Bone Mineral Density change vs baseline:  Not reported  Bone Mineral Density change vs previous: -9.0%      LEFT FEMUR -TOTAL  Bone Mineral Density: 0.774  T-Score -1.9   Z-Score  -2.3      Bone Mineral Density change vs baseline: Not reported  Bone Mineral Density change vs previous: -2.1%      LEFT FEMUR -NECK  Bone Mineral Density: 0.897  T-Score -1.0  Z-Score -1.0          RIGHT FOREARM  Bone Mineral Density: 0.787  T-Score 1.7   Z-Score 1.6  UD  Bone Mineral Density: 0.468  T-Score 0.0  Z-Score -0.1  MID  Bone Mineral Density: Not reported  T-Score Not reported  Z-Score  Not reported  1/3  Bone Mineral Density: 1.049  T-Score 1.8  Z-Score 1.8  Bone Mineral Density change vs baseline:  Not reported  Bone Mineral Density change vs previous:  -0.1%           Any family history of thyroid cancer? no  Any personal history of radiation to your " head/neck? no    Past Medical History  She has a past medical history of Abdominal pain, LLQ (left lower quadrant) (07/18/2023), Acute UTI (07/18/2023), Anemia, Constipation (07/18/2023), hyperparathyroidism, malignant neoplasm of vagina, Hypertension, Iron deficiency anemia, Left wrist pain (07/18/2023), Neurogenic bladder, Neuropathy, Osteoporosis, Recurrent UTI, Right shoulder pain (07/18/2023), Skin irritation (07/18/2023), Stiffness of right shoulder joint (07/18/2023), Vaginal adhesions, and Vaginal cancer (Multi).    She has no past medical history of Delayed emergence from general anesthesia, Malignant hyperthermia, or PONV (postoperative nausea and vomiting).    Surgical History  She has a past surgical history that includes Vaginal mass excision (03/09/2015); Other surgical history (11/01/2017); US guided biopsy lymph node superficial (12/03/2018); and Parathyroid gland surgery.     Social History  She reports that she has never smoked. She has been exposed to tobacco smoke. She has never used smokeless tobacco. She reports current alcohol use. She reports that she does not use drugs.    Family History  Family History   Problem Relation Name Age of Onset    Endometrial cancer Mother      Hypertension Mother      Obesity Mother      Lung cancer Father      Leukemia Brother      Other (Endometrial carcinoma) Mother          Allergies  Sulfa (sulfonamide antibiotics), Alendronate sodium, Alendronic acid, and Clindamycin    Review of Systems    Past Medical History:   Diagnosis Date    Abdominal pain, LLQ (left lower quadrant) 07/18/2023    Acute UTI 07/18/2023    Anemia     Constipation 07/18/2023    Hx of hyperparathyroidism     Hx of malignant neoplasm of vagina     Hypertension     Iron deficiency anemia     Left wrist pain 07/18/2023    Neurogenic bladder     Neuropathy     Osteoporosis     Recurrent UTI     Right shoulder pain 07/18/2023    Skin irritation 07/18/2023    Stiffness of right shoulder joint  07/18/2023    Vaginal adhesions     Vaginal cancer (Multi)        Past Surgical History:   Procedure Laterality Date    OTHER SURGICAL HISTORY  11/01/2017    Nephrectomy Right    PARATHYROID GLAND SURGERY      US GUIDED BIOPSY LYMPH NODE SUPERFICIAL  12/03/2018    US GUIDED BIOPSY LYMPH NODE SUPERFICIAL 12/3/2018 [facility]    VAGINAL MASS EXCISION  03/09/2015    Excision Of Vaginal Cyst Or Tumor       Social History     Socioeconomic History    Marital status: Single     Spouse name: Not on file    Number of children: Not on file    Years of education: Not on file    Highest education level: Not on file   Occupational History    Not on file   Tobacco Use    Smoking status: Never     Passive exposure: Past    Smokeless tobacco: Never   Vaping Use    Vaping status: Never Used   Substance and Sexual Activity    Alcohol use: Yes     Comment: social LESS THAN 1 X WEEK    Drug use: Never    Sexual activity: Not Currently   Other Topics Concern    Not on file   Social History Narrative    ** Merged History Encounter **          Social Drivers of Health     Financial Resource Strain: Low Risk  (12/2/2023)    Overall Financial Resource Strain (CARDIA)     Difficulty of Paying Living Expenses: Not hard at all   Food Insecurity: No Food Insecurity (6/12/2024)    Hunger Vital Sign     Worried About Running Out of Food in the Last Year: Never true     Ran Out of Food in the Last Year: Never true   Transportation Needs: No Transportation Needs (12/2/2023)    PRAPARE - Transportation     Lack of Transportation (Medical): No     Lack of Transportation (Non-Medical): No   Physical Activity: Inactive (11/27/2023)    Exercise Vital Sign     Days of Exercise per Week: 0 days     Minutes of Exercise per Session: 0 min   Stress: No Stress Concern Present (11/27/2023)    [employer] [place] [employer] - Occupational Stress Questionnaire     Feeling of Stress : Not at all   Social Connections: Moderately Isolated  "(11/27/2023)    Social Connection and Isolation Panel [NHANES]     Frequency of Communication with Friends and Family: More than three times a week     Frequency of Social Gatherings with Friends and Family: More than three times a week     Attends Congregational Services: Never     Active Member of Clubs or Organizations: Yes     Attends Club or Organization Meetings: Never     Marital Status: Never    Intimate Partner Violence: Not At Risk (11/27/2023)    Humiliation, Afraid, Rape, and Kick questionnaire     Fear of Current or Ex-Partner: No     Emotionally Abused: No     Physically Abused: No     Sexually Abused: No   Housing Stability: Low Risk  (12/2/2023)    Housing Stability Vital Sign     Unable to Pay for Housing in the Last Year: No     Number of Places Lived in the Last Year: 1     Unstable Housing in the Last Year: No        Physical Exam     ROS, PMH, FH/SH, surgical history and allergies have been reviewed.    Last Recorded Vitals  Blood pressure 138/85, pulse 79, temperature 36.6 °C (97.9 °F), temperature source Tympanic, resp. rate 18, height 1.702 m (5' 7\"), weight 87.3 kg (192 lb 6.4 oz).    Relevant Results         If you would like to pull in Medications, type .meds     If you would like to pull in Lab results for the last 24 hours, type .[id number]    If you would like to pull in specific Lab results, type .ll     If you would like to pull in Imaging results, type .imgrslt :99}  Lab Review  Lab Results   Component Value Date    BILITOT 0.6 12/10/2024    CALCIUM 9.6 12/10/2024    CO2 24 12/10/2024     12/10/2024    CREATININE 0.99 12/10/2024    GLUCOSE 100 (H) 12/10/2024    ALKPHOS 59 12/10/2024    K 3.7 12/10/2024    PROT 6.9 12/10/2024     12/10/2024    AST 16 12/10/2024    ALT 10 12/10/2024    BUN 18 12/10/2024    ANIONGAP 15 12/10/2024    MG 1.7 09/20/2023    PHOS 3.8 03/20/2024    ALBUMIN 4.0 12/10/2024    LIPASE 56 04/03/2019    GFRF 64 09/15/2023    GFRMALE CANCELED 08/08/2023 "     Lab Results   Component Value Date    TRIG 84 09/25/2024    CHOL 203 (H) 09/25/2024    LDLCALC 132 (H) 09/25/2024    HDL 54.6 09/25/2024     Lab Results   Component Value Date    HGBA1C 5.3 09/25/2024     The 10-year ASCVD risk score (Ayush TERRY, et al., 2019) is: 5.5%    Values used to calculate the score:      Age: 59 years      Sex: Female      Is Non- : Yes      Diabetic: No      Tobacco smoker: No      Systolic Blood Pressure: 138 mmHg      Is BP treated: No      HDL Cholesterol: 54.6 mg/dL      Total Cholesterol: 203 mg/dL       Assessment/Plan   Problem List Items Addressed This Visit             ICD-10-CM    Avitaminosis D E55.9    Relevant Orders    XR DEXA bone density    Follow Up In Endocrinology    Osteoporosis - Primary M81.0    Relevant Orders    XR DEXA bone density    Follow Up In Endocrinology            Osteoporosis from primary hyperparathyroidism that is treated with thyroidectomy  Her bone density last was December 2023  Especially in the lumbar area  She is due to have a repeat this year  I ordered bone density  She will need Prolia injections order sets are in the system      Osteoporosis in endocrine disorders (733.09) (M81.8,E34.9)   · Primary hyperparathyroidism      Continue calcium vitamin D repeat CMP vitamin D level before next infusion which will be around June  - Repeat Ca, Vitamin D before next infusion  - Continue Vitamin D 4000 IU daily, Caltrate daily  - RTC in 12 months   I spent a total of 30+ minutes on the date of the service which included preparing to see the patient, face-to-face patient care, completing clinical documentation, obtaining and / or reviewing separately obtained history, counseling and educating the patient/family/caregiver, ordering medications, tests, or procedures, communicating with other healthcare providers (not separately reported), independently interpreting results, not separately reported, and communicating results to the  patient/family/caregiver, Name and date of birth verified.        Kamar Treadwell MD

## 2025-01-24 ENCOUNTER — APPOINTMENT (OUTPATIENT)
Dept: PRIMARY CARE | Facility: CLINIC | Age: 60
End: 2025-01-24
Payer: MEDICARE

## 2025-01-24 NOTE — TELEPHONE ENCOUNTER
Pa shows aborted - unclear when or why.  She did receive Prolia in December. I have Resubmitted under KEY [email or web address] to have PA on file for next dosage due in 6 months

## 2025-02-04 ENCOUNTER — APPOINTMENT (OUTPATIENT)
Dept: PRIMARY CARE | Facility: CLINIC | Age: 60
End: 2025-02-04
Payer: MEDICARE

## 2025-02-04 VITALS
SYSTOLIC BLOOD PRESSURE: 144 MMHG | OXYGEN SATURATION: 97 % | DIASTOLIC BLOOD PRESSURE: 94 MMHG | HEIGHT: 67 IN | BODY MASS INDEX: 30.45 KG/M2 | HEART RATE: 77 BPM | WEIGHT: 194 LBS

## 2025-02-04 DIAGNOSIS — I89.0 LYMPHEDEMA OF LEFT LEG: ICD-10-CM

## 2025-02-04 DIAGNOSIS — C52 MALIGNANT NEOPLASM OF VAGINA (MULTI): ICD-10-CM

## 2025-02-04 DIAGNOSIS — E78.5 ELEVATED LIPIDS: ICD-10-CM

## 2025-02-04 DIAGNOSIS — Z12.31 SCREENING MAMMOGRAM FOR BREAST CANCER: ICD-10-CM

## 2025-02-04 DIAGNOSIS — I10 BENIGN ESSENTIAL HTN: Primary | ICD-10-CM

## 2025-02-04 PROCEDURE — 3077F SYST BP >= 140 MM HG: CPT

## 2025-02-04 PROCEDURE — 3080F DIAST BP >= 90 MM HG: CPT

## 2025-02-04 PROCEDURE — 1036F TOBACCO NON-USER: CPT

## 2025-02-04 PROCEDURE — 3008F BODY MASS INDEX DOCD: CPT

## 2025-02-04 PROCEDURE — 99214 OFFICE O/P EST MOD 30 MIN: CPT

## 2025-02-04 RX ORDER — HYDROCHLOROTHIAZIDE 25 MG/1
25 TABLET ORAL DAILY
Qty: 90 TABLET | Refills: 0 | Status: SHIPPED | OUTPATIENT
Start: 2025-02-04

## 2025-02-04 NOTE — PROGRESS NOTES
"Primary Care Provider: DAYRON Shanks-CNP    Subjective   Laurence Campbell \"Jeremiah" is a 59 y.o. female who presents for Follow-up (No concerns per pt).    HPI     PMH of HTN, chronic CYNTHIA, and Stage 3 vaginal cancer s/p chemoradiation (2004) with complications such as radiation cystitis, radiation proctitis, ureter obstruction requiring stents, and right nephrectomy- she developed a neurogenic bladder, hx of intermittent cath and an indwelling meza & recurrent UTIs- urinating on her own now. Left lower leg lymphedema-has been stable.    HTN- no home BP's    HLD    LLE lymphedema has been stable    HM:  Mammogram last 9/26/24- repeat due 9/26/25  Colonoscopy due 8/2027  Healthcare POA- sister Katiana Novoa; alternate would be her mom Lily Campbell    Review of Systems  The remainder of the ROS was negative unless otherwise stated in the HPI.       Objective   BP (!) 144/94   Pulse 77   Ht 1.702 m (5' 7\")   Wt 88 kg (194 lb)   SpO2 97%   BMI 30.38 kg/m²     Physical Exam  Vitals reviewed.   Constitutional:       General: She is not in acute distress.     Appearance: Normal appearance. She is normal weight. She is not ill-appearing, toxic-appearing or diaphoretic.   HENT:      Head: Normocephalic and atraumatic.      Nose: Nose normal.   Eyes:      Conjunctiva/sclera: Conjunctivae normal.   Cardiovascular:      Rate and Rhythm: Normal rate and regular rhythm.      Pulses: Normal pulses.      Heart sounds: Normal heart sounds. No murmur heard.     No friction rub. No gallop.   Pulmonary:      Effort: Pulmonary effort is normal. No respiratory distress.      Breath sounds: Normal breath sounds.   Musculoskeletal:         General: Normal range of motion.      Cervical back: Normal range of motion and neck supple.      Left lower leg: Edema (chronic; stable) present.   Skin:     General: Skin is warm and dry.      Capillary Refill: Capillary refill takes less than 2 seconds.   Neurological:      " General: No focal deficit present.      Mental Status: She is alert and oriented to person, place, and time. Mental status is at baseline.   Psychiatric:         Mood and Affect: Mood normal.         Behavior: Behavior normal.         Thought Content: Thought content normal.         Judgment: Judgment normal.         Assessment/Plan   Problem List Items Addressed This Visit             ICD-10-CM    Benign essential HTN - Primary I10    Above goal  Start checking BP daily  Relevant Medications    INCREASE hydroCHLOROthiazide (HYDRODiuril) 25 mg tablet    Other Relevant Orders    Basic metabolic panel    Malignant neoplasm of vagina (Multi)  Stable  Continue following with oncology- sees next in December 2025  Stage 3 vaginal cancer s/p chemoradiation (2004) with complications such as radiation cystitis, radiation proctitis, ureter obstruction requiring stents, and right nephrectomy- she developed a neurogenic bladder, hx of intermittent cath and an indwelling meza & recurrent UTIs- urinating on her own now.  C52    Elevated lipids E78.5    Recheck; would like her to work on lifestyle changes and possibly start statin  Relevant Orders    Lipid Panel    Lymphedema of left leg  Stable; improving I89.0    Screening mammogram for breast cancer Z12.31    Relevant Orders    BI mammo bilateral screening tomosynthesis             HM:  Mammogram last 9/26/24- repeat due 9/26/25  Colonoscopy due 8/2027  Healthcare POA- sister Katiana Novoa; alternate would be her mom Lily Campbell      Blood work and follow up in 4-6 weeks or sooner if needed

## 2025-02-27 ENCOUNTER — TELEPHONE (OUTPATIENT)
Dept: ENDOCRINOLOGY | Facility: CLINIC | Age: 60
End: 2025-02-27
Payer: MEDICARE

## 2025-03-03 ENCOUNTER — DOCUMENTATION (OUTPATIENT)
Dept: OPERATING ROOM | Facility: HOSPITAL | Age: 60
End: 2025-03-03

## 2025-03-03 PROCEDURE — 87086 URINE CULTURE/COLONY COUNT: CPT

## 2025-03-04 ENCOUNTER — APPOINTMENT (OUTPATIENT)
Dept: PRIMARY CARE | Facility: CLINIC | Age: 60
End: 2025-03-04
Payer: MEDICARE

## 2025-03-04 ENCOUNTER — LAB REQUISITION (OUTPATIENT)
Dept: LAB | Facility: HOSPITAL | Age: 60
End: 2025-03-04
Payer: MEDICARE

## 2025-03-04 DIAGNOSIS — R82.81 PYURIA: ICD-10-CM

## 2025-03-05 LAB — BACTERIA UR CULT: NORMAL

## 2025-03-12 DIAGNOSIS — D64.9 ANEMIA, UNSPECIFIED TYPE: ICD-10-CM

## 2025-03-13 ENCOUNTER — HOSPITAL ENCOUNTER (INPATIENT)
Facility: HOSPITAL | Age: 60
LOS: 3 days | Discharge: HOME | DRG: 659 | End: 2025-03-16
Attending: EMERGENCY MEDICINE | Admitting: INTERNAL MEDICINE
Payer: MEDICARE

## 2025-03-13 ENCOUNTER — APPOINTMENT (OUTPATIENT)
Dept: RADIOLOGY | Facility: HOSPITAL | Age: 60
DRG: 659 | End: 2025-03-13
Payer: MEDICARE

## 2025-03-13 DIAGNOSIS — N13.9 OBSTRUCTIVE UROPATHY: ICD-10-CM

## 2025-03-13 DIAGNOSIS — N17.9 ACUTE RENAL FAILURE, UNSPECIFIED ACUTE RENAL FAILURE TYPE: Primary | ICD-10-CM

## 2025-03-13 DIAGNOSIS — J18.9 PNEUMONIA OF LEFT LOWER LOBE DUE TO INFECTIOUS ORGANISM: ICD-10-CM

## 2025-03-13 DIAGNOSIS — N39.0 RECURRENT UTI: ICD-10-CM

## 2025-03-13 LAB
ALBUMIN SERPL BCP-MCNC: 3 G/DL (ref 3.4–5)
ALP SERPL-CCNC: 102 U/L (ref 33–110)
ALT SERPL W P-5'-P-CCNC: 24 U/L (ref 7–45)
ANION GAP SERPL CALC-SCNC: 16 MMOL/L (ref 10–20)
AST SERPL W P-5'-P-CCNC: 31 U/L (ref 9–39)
BASOPHILS # BLD AUTO: 0.04 X10*3/UL (ref 0–0.1)
BASOPHILS NFR BLD AUTO: 0.3 %
BILIRUB SERPL-MCNC: 2.3 MG/DL (ref 0–1.2)
BUN SERPL-MCNC: 96 MG/DL (ref 6–23)
CALCIUM SERPL-MCNC: 7.2 MG/DL (ref 8.6–10.3)
CHLORIDE SERPL-SCNC: 99 MMOL/L (ref 98–107)
CO2 SERPL-SCNC: 20 MMOL/L (ref 21–32)
CREAT SERPL-MCNC: 6.06 MG/DL (ref 0.5–1.05)
EGFRCR SERPLBLD CKD-EPI 2021: 8 ML/MIN/1.73M*2
EOSINOPHIL # BLD AUTO: 0.03 X10*3/UL (ref 0–0.7)
EOSINOPHIL NFR BLD AUTO: 0.2 %
ERYTHROCYTE [DISTWIDTH] IN BLOOD BY AUTOMATED COUNT: 14 % (ref 11.5–14.5)
FLUAV RNA RESP QL NAA+PROBE: NOT DETECTED
FLUBV RNA RESP QL NAA+PROBE: NOT DETECTED
GLUCOSE SERPL-MCNC: 115 MG/DL (ref 74–99)
HCT VFR BLD AUTO: 34.3 % (ref 36–46)
HGB BLD-MCNC: 12.7 G/DL (ref 12–16)
IMM GRANULOCYTES # BLD AUTO: 0.16 X10*3/UL (ref 0–0.7)
IMM GRANULOCYTES NFR BLD AUTO: 1 % (ref 0–0.9)
LYMPHOCYTES # BLD AUTO: 0.81 X10*3/UL (ref 1.2–4.8)
LYMPHOCYTES NFR BLD AUTO: 5.2 %
MCH RBC QN AUTO: 29.7 PG (ref 26–34)
MCHC RBC AUTO-ENTMCNC: 37 G/DL (ref 32–36)
MCV RBC AUTO: 80 FL (ref 80–100)
MONOCYTES # BLD AUTO: 1.54 X10*3/UL (ref 0.1–1)
MONOCYTES NFR BLD AUTO: 9.9 %
NEUTROPHILS # BLD AUTO: 12.94 X10*3/UL (ref 1.2–7.7)
NEUTROPHILS NFR BLD AUTO: 83.4 %
NRBC BLD-RTO: 0 /100 WBCS (ref 0–0)
PLATELET # BLD AUTO: 138 X10*3/UL (ref 150–450)
POTASSIUM SERPL-SCNC: 3.5 MMOL/L (ref 3.5–5.3)
PROT SERPL-MCNC: 5.7 G/DL (ref 6.4–8.2)
RBC # BLD AUTO: 4.28 X10*6/UL (ref 4–5.2)
SARS-COV-2 RNA RESP QL NAA+PROBE: NOT DETECTED
SODIUM SERPL-SCNC: 131 MMOL/L (ref 136–145)
WBC # BLD AUTO: 15.5 X10*3/UL (ref 4.4–11.3)

## 2025-03-13 PROCEDURE — 84075 ASSAY ALKALINE PHOSPHATASE: CPT | Performed by: EMERGENCY MEDICINE

## 2025-03-13 PROCEDURE — 85025 COMPLETE CBC W/AUTO DIFF WBC: CPT | Performed by: EMERGENCY MEDICINE

## 2025-03-13 PROCEDURE — 71045 X-RAY EXAM CHEST 1 VIEW: CPT | Mod: FOREIGN READ | Performed by: RADIOLOGY

## 2025-03-13 PROCEDURE — 36415 COLL VENOUS BLD VENIPUNCTURE: CPT | Performed by: EMERGENCY MEDICINE

## 2025-03-13 PROCEDURE — 99291 CRITICAL CARE FIRST HOUR: CPT | Mod: 25 | Performed by: EMERGENCY MEDICINE

## 2025-03-13 PROCEDURE — 74176 CT ABD & PELVIS W/O CONTRAST: CPT | Mod: FOREIGN READ | Performed by: RADIOLOGY

## 2025-03-13 PROCEDURE — 1200000002 HC GENERAL ROOM WITH TELEMETRY DAILY

## 2025-03-13 PROCEDURE — 74176 CT ABD & PELVIS W/O CONTRAST: CPT

## 2025-03-13 PROCEDURE — 2500000004 HC RX 250 GENERAL PHARMACY W/ HCPCS (ALT 636 FOR OP/ED): Performed by: EMERGENCY MEDICINE

## 2025-03-13 PROCEDURE — 96374 THER/PROPH/DIAG INJ IV PUSH: CPT

## 2025-03-13 PROCEDURE — 96361 HYDRATE IV INFUSION ADD-ON: CPT

## 2025-03-13 PROCEDURE — 99222 1ST HOSP IP/OBS MODERATE 55: CPT | Performed by: INTERNAL MEDICINE

## 2025-03-13 PROCEDURE — 71045 X-RAY EXAM CHEST 1 VIEW: CPT

## 2025-03-13 PROCEDURE — 87636 SARSCOV2 & INF A&B AMP PRB: CPT | Performed by: EMERGENCY MEDICINE

## 2025-03-13 PROCEDURE — 2500000004 HC RX 250 GENERAL PHARMACY W/ HCPCS (ALT 636 FOR OP/ED): Performed by: INTERNAL MEDICINE

## 2025-03-13 PROCEDURE — 99222 1ST HOSP IP/OBS MODERATE 55: CPT

## 2025-03-13 RX ORDER — GUAIFENESIN 600 MG/1
600 TABLET, EXTENDED RELEASE ORAL EVERY 12 HOURS PRN
Status: DISCONTINUED | OUTPATIENT
Start: 2025-03-13 | End: 2025-03-16 | Stop reason: HOSPADM

## 2025-03-13 RX ORDER — ACETAMINOPHEN 325 MG/1
650 TABLET ORAL EVERY 4 HOURS PRN
Status: DISCONTINUED | OUTPATIENT
Start: 2025-03-13 | End: 2025-03-16 | Stop reason: HOSPADM

## 2025-03-13 RX ORDER — ACETAMINOPHEN 650 MG/1
650 SUPPOSITORY RECTAL EVERY 4 HOURS PRN
Status: DISCONTINUED | OUTPATIENT
Start: 2025-03-13 | End: 2025-03-16 | Stop reason: HOSPADM

## 2025-03-13 RX ORDER — PANTOPRAZOLE SODIUM 40 MG/1
40 TABLET, DELAYED RELEASE ORAL
Status: DISCONTINUED | OUTPATIENT
Start: 2025-03-14 | End: 2025-03-16 | Stop reason: HOSPADM

## 2025-03-13 RX ORDER — POLYETHYLENE GLYCOL 3350 17 G/17G
17 POWDER, FOR SOLUTION ORAL DAILY PRN
Status: DISCONTINUED | OUTPATIENT
Start: 2025-03-13 | End: 2025-03-16 | Stop reason: HOSPADM

## 2025-03-13 RX ORDER — ONDANSETRON HYDROCHLORIDE 2 MG/ML
4 INJECTION, SOLUTION INTRAVENOUS EVERY 8 HOURS PRN
Status: DISCONTINUED | OUTPATIENT
Start: 2025-03-13 | End: 2025-03-16 | Stop reason: HOSPADM

## 2025-03-13 RX ORDER — ONDANSETRON 4 MG/1
4 TABLET, FILM COATED ORAL EVERY 8 HOURS PRN
Status: DISCONTINUED | OUTPATIENT
Start: 2025-03-13 | End: 2025-03-16 | Stop reason: HOSPADM

## 2025-03-13 RX ORDER — HEPARIN SODIUM 5000 [USP'U]/ML
5000 INJECTION, SOLUTION INTRAVENOUS; SUBCUTANEOUS EVERY 8 HOURS SCHEDULED
Status: DISCONTINUED | OUTPATIENT
Start: 2025-03-13 | End: 2025-03-16 | Stop reason: HOSPADM

## 2025-03-13 RX ORDER — SODIUM CHLORIDE 9 MG/ML
125 INJECTION, SOLUTION INTRAVENOUS CONTINUOUS
Status: ACTIVE | OUTPATIENT
Start: 2025-03-13 | End: 2025-03-14

## 2025-03-13 RX ORDER — ACETAMINOPHEN 160 MG/5ML
650 SOLUTION ORAL EVERY 4 HOURS PRN
Status: DISCONTINUED | OUTPATIENT
Start: 2025-03-13 | End: 2025-03-16 | Stop reason: HOSPADM

## 2025-03-13 RX ORDER — PANTOPRAZOLE SODIUM 40 MG/10ML
40 INJECTION, POWDER, LYOPHILIZED, FOR SOLUTION INTRAVENOUS
Status: DISCONTINUED | OUTPATIENT
Start: 2025-03-14 | End: 2025-03-16 | Stop reason: HOSPADM

## 2025-03-13 RX ORDER — TALC
3 POWDER (GRAM) TOPICAL NIGHTLY PRN
Status: DISCONTINUED | OUTPATIENT
Start: 2025-03-13 | End: 2025-03-16 | Stop reason: HOSPADM

## 2025-03-13 RX ADMIN — SODIUM CHLORIDE 125 ML/HR: 9 INJECTION, SOLUTION INTRAVENOUS at 21:30

## 2025-03-13 RX ADMIN — SODIUM CHLORIDE 125 ML/HR: 9 INJECTION, SOLUTION INTRAVENOUS at 15:26

## 2025-03-13 RX ADMIN — CEFTRIAXONE 2 G: 2 INJECTION, POWDER, FOR SOLUTION INTRAMUSCULAR; INTRAVENOUS at 15:25

## 2025-03-13 RX ADMIN — AZITHROMYCIN MONOHYDRATE 500 MG: 500 INJECTION, POWDER, LYOPHILIZED, FOR SOLUTION INTRAVENOUS at 16:54

## 2025-03-13 RX ADMIN — SODIUM CHLORIDE 1000 ML: 9 INJECTION, SOLUTION INTRAVENOUS at 12:37

## 2025-03-13 RX ADMIN — HEPARIN SODIUM 5000 UNITS: 5000 INJECTION, SOLUTION INTRAVENOUS; SUBCUTANEOUS at 21:28

## 2025-03-13 SDOH — SOCIAL STABILITY: SOCIAL INSECURITY: ARE YOU OR HAVE YOU BEEN THREATENED OR ABUSED PHYSICALLY, EMOTIONALLY, OR SEXUALLY BY ANYONE?: NO

## 2025-03-13 SDOH — ECONOMIC STABILITY: FOOD INSECURITY: WITHIN THE PAST 12 MONTHS, THE FOOD YOU BOUGHT JUST DIDN'T LAST AND YOU DIDN'T HAVE MONEY TO GET MORE.: NEVER TRUE

## 2025-03-13 SDOH — SOCIAL STABILITY: SOCIAL INSECURITY: WERE YOU ABLE TO COMPLETE ALL THE BEHAVIORAL HEALTH SCREENINGS?: YES

## 2025-03-13 SDOH — SOCIAL STABILITY: SOCIAL INSECURITY: DOES ANYONE TRY TO KEEP YOU FROM HAVING/CONTACTING OTHER FRIENDS OR DOING THINGS OUTSIDE YOUR HOME?: NO

## 2025-03-13 SDOH — HEALTH STABILITY: PHYSICAL HEALTH
HOW OFTEN DO YOU NEED TO HAVE SOMEONE HELP YOU WHEN YOU READ INSTRUCTIONS, PAMPHLETS, OR OTHER WRITTEN MATERIAL FROM YOUR DOCTOR OR PHARMACY?: NEVER

## 2025-03-13 SDOH — SOCIAL STABILITY: SOCIAL INSECURITY: WITHIN THE LAST YEAR, HAVE YOU BEEN HUMILIATED OR EMOTIONALLY ABUSED IN OTHER WAYS BY YOUR PARTNER OR EX-PARTNER?: NO

## 2025-03-13 SDOH — SOCIAL STABILITY: SOCIAL INSECURITY: WITHIN THE LAST YEAR, HAVE YOU BEEN AFRAID OF YOUR PARTNER OR EX-PARTNER?: NO

## 2025-03-13 SDOH — SOCIAL STABILITY: SOCIAL INSECURITY: DO YOU FEEL UNSAFE GOING BACK TO THE PLACE WHERE YOU ARE LIVING?: NO

## 2025-03-13 SDOH — ECONOMIC STABILITY: INCOME INSECURITY: IN THE PAST 12 MONTHS HAS THE ELECTRIC, GAS, OIL, OR WATER COMPANY THREATENED TO SHUT OFF SERVICES IN YOUR HOME?: NO

## 2025-03-13 SDOH — ECONOMIC STABILITY: FOOD INSECURITY: WITHIN THE PAST 12 MONTHS, YOU WORRIED THAT YOUR FOOD WOULD RUN OUT BEFORE YOU GOT THE MONEY TO BUY MORE.: NEVER TRUE

## 2025-03-13 SDOH — SOCIAL STABILITY: SOCIAL INSECURITY: HAVE YOU HAD THOUGHTS OF HARMING ANYONE ELSE?: NO

## 2025-03-13 SDOH — SOCIAL STABILITY: SOCIAL INSECURITY: ARE THERE ANY APPARENT SIGNS OF INJURIES/BEHAVIORS THAT COULD BE RELATED TO ABUSE/NEGLECT?: NO

## 2025-03-13 SDOH — SOCIAL STABILITY: SOCIAL INSECURITY
WITHIN THE LAST YEAR, HAVE YOU BEEN RAPED OR FORCED TO HAVE ANY KIND OF SEXUAL ACTIVITY BY YOUR PARTNER OR EX-PARTNER?: NO

## 2025-03-13 SDOH — SOCIAL STABILITY: SOCIAL INSECURITY: HAVE YOU HAD ANY THOUGHTS OF HARMING ANYONE ELSE?: NO

## 2025-03-13 SDOH — SOCIAL STABILITY: SOCIAL INSECURITY: ABUSE: ADULT

## 2025-03-13 SDOH — SOCIAL STABILITY: SOCIAL INSECURITY: DO YOU FEEL ANYONE HAS EXPLOITED OR TAKEN ADVANTAGE OF YOU FINANCIALLY OR OF YOUR PERSONAL PROPERTY?: NO

## 2025-03-13 SDOH — SOCIAL STABILITY: SOCIAL INSECURITY: HAS ANYONE EVER THREATENED TO HURT YOUR FAMILY OR YOUR PETS?: NO

## 2025-03-13 ASSESSMENT — COGNITIVE AND FUNCTIONAL STATUS - GENERAL
DAILY ACTIVITIY SCORE: 24
MOBILITY SCORE: 24
PATIENT BASELINE BEDBOUND: NO

## 2025-03-13 ASSESSMENT — LIFESTYLE VARIABLES
HOW MANY STANDARD DRINKS CONTAINING ALCOHOL DO YOU HAVE ON A TYPICAL DAY: 1 OR 2
HOW OFTEN DO YOU HAVE 6 OR MORE DRINKS ON ONE OCCASION: NEVER
AUDIT-C TOTAL SCORE: 1
AUDIT-C TOTAL SCORE: 1
SKIP TO QUESTIONS 9-10: 1
SUBSTANCE_ABUSE_PAST_12_MONTHS: NO
HOW OFTEN DO YOU HAVE A DRINK CONTAINING ALCOHOL: MONTHLY OR LESS
PRESCIPTION_ABUSE_PAST_12_MONTHS: NO

## 2025-03-13 ASSESSMENT — PAIN SCALES - GENERAL
PAINLEVEL_OUTOF10: 8
PAINLEVEL_OUTOF10: 0 - NO PAIN

## 2025-03-13 ASSESSMENT — ENCOUNTER SYMPTOMS
VOMITING: 1
ABDOMINAL PAIN: 1
COUGH: 1

## 2025-03-13 ASSESSMENT — PAIN DESCRIPTION - PAIN TYPE: TYPE: ACUTE PAIN

## 2025-03-13 ASSESSMENT — ACTIVITIES OF DAILY LIVING (ADL)
TOILETING: INDEPENDENT
JUDGMENT_ADEQUATE_SAFELY_COMPLETE_DAILY_ACTIVITIES: YES
LACK_OF_TRANSPORTATION: NO
FEEDING YOURSELF: INDEPENDENT
HEARING - LEFT EAR: FUNCTIONAL
LACK_OF_TRANSPORTATION: NO
DRESSING YOURSELF: INDEPENDENT
WALKS IN HOME: INDEPENDENT
HEARING - RIGHT EAR: FUNCTIONAL
PATIENT'S MEMORY ADEQUATE TO SAFELY COMPLETE DAILY ACTIVITIES?: YES
BATHING: INDEPENDENT
GROOMING: INDEPENDENT
ADEQUATE_TO_COMPLETE_ADL: YES

## 2025-03-13 ASSESSMENT — PAIN - FUNCTIONAL ASSESSMENT
PAIN_FUNCTIONAL_ASSESSMENT: 0-10
PAIN_FUNCTIONAL_ASSESSMENT: 0-10

## 2025-03-13 ASSESSMENT — PATIENT HEALTH QUESTIONNAIRE - PHQ9
1. LITTLE INTEREST OR PLEASURE IN DOING THINGS: NOT AT ALL
2. FEELING DOWN, DEPRESSED OR HOPELESS: NOT AT ALL
SUM OF ALL RESPONSES TO PHQ9 QUESTIONS 1 & 2: 0

## 2025-03-13 ASSESSMENT — PAIN DESCRIPTION - LOCATION: LOCATION: ABDOMEN

## 2025-03-13 NOTE — H&P
Laurence Campbell is a 59 y.o. female   Abdominal Pain  Associated symptoms include vomiting.   Vomiting   Associated symptoms include abdominal pain and coughing.   Cough       Patient with a past medical history of malignant vaginal neoplasm in remission hypertension iron deficiency anemia history of hyperparathyroidism history of solitary kidney status post nephrectomy presented to the hospital with abdominal discomfort nausea and vomiting which started 3 days ago  The patient has had a history of neurogenic bladder and had some old catheters lying on postop cath she catheter self was not able to use urine  Workup in the emergency room showed acute kidney injury with a creatinine of 6 along with imaging showing hydronephrosis  Urology has evaluated the patient and the plan is to take the patient to the OR tomorrow    Past Medical History  Past Medical History:   Diagnosis Date    Abdominal pain, LLQ (left lower quadrant) 07/18/2023    Acute UTI 07/18/2023    Anemia     Constipation 07/18/2023    Hx of hyperparathyroidism     Hx of malignant neoplasm of vagina     Hypertension     Iron deficiency anemia     Left wrist pain 07/18/2023    Neurogenic bladder     Neuropathy     Osteoporosis     Recurrent UTI     Right shoulder pain 07/18/2023    Skin irritation 07/18/2023    Stiffness of right shoulder joint 07/18/2023    Vaginal adhesions     Vaginal cancer (Multi)        Surgical History  Past Surgical History:   Procedure Laterality Date    OTHER SURGICAL HISTORY  11/01/2017    Nephrectomy Right    PARATHYROID GLAND SURGERY      US GUIDED BIOPSY LYMPH NODE SUPERFICIAL  12/03/2018    US GUIDED BIOPSY LYMPH NODE SUPERFICIAL 12/3/2018 [facility]    VAGINAL MASS EXCISION  03/09/2015    Excision Of Vaginal Cyst Or Tumor        Social History  She reports that she has never smoked. She has been exposed to tobacco smoke. She has never used smokeless tobacco. She reports current alcohol use. She reports that she does  not use drugs.    Family History  Family History   Problem Relation Name Age of Onset    Endometrial cancer Mother      Hypertension Mother      Obesity Mother      Lung cancer Father      Leukemia Brother      Other (Endometrial carcinoma) Mother          Allergies  Sulfa (sulfonamide antibiotics), Alendronate sodium, Alendronic acid, and Clindamycin    Review of Systems   Respiratory:  Positive for cough.    Gastrointestinal:  Positive for abdominal pain and vomiting.        Constitutional: Feeling poorly  Eyes: no blurred vision and no diplopia.   ENT: no hearing loss, no tinnitus, no earache, no sore throat, no hoarseness and no swollen glands in the neck.   Cardiovascular: no chest pain, no tightness or heavy pressure, no shortness of breath, no palpitations and no lower extremity edema.   Respiratory: no cough, wheezing or shortness of breath at rest or exertion  Gastrointestinal: no change in bowel habits, no diarrhea, no constipation, no bloody stools, no nausea, no vomiting, no abdominal pain, no signs and symptoms of ulcer disease, no briana colored stools and no intolerance to fatty foods.   Genitourinary: no urinary frequency, no dysuria,   Musculoskeletal: no arthralgias, no joint stiffness, no muscle weakness, no back pain and no difficulty walking.   Skin: no rashes, no change in skin color and pigmentation, no skin lesions and no skin lumps.   Neurological: no headaches, no dizziness, no seizures, no tingling, no numbness, no signs and symptoms of stroke and no limb weakness.   Psychiatric: no confusion, no memory lapses or loss, no depression and no sleep disturbances.   Endocrine: no goiter, no thyroid disorder, no diabetes mellitus, no excessive thirst, no dry skin, no cold intolerance, no heat intolerance and no increased urinary frequency.   Hematologic/Lymphatic: is not slow to heal, does not bleed easily, does not bruise easily, no thrombophlebitis, no anemia and no history of blood transfusion.    All other systems have been reviewed and are negative for complaint.     Vitals:    03/13/25 1800   BP: (!) 130/94   Pulse:    Resp:    Temp:    SpO2: 98%        Scheduled medications  azithromycin, 500 mg, intravenous, q24h  cefTRIAXone, 2 g, intravenous, q24h  denosumab, 60 mg, subcutaneous, q6 months      Continuous medications  sodium chloride 0.9%, 125 mL/hr, Last Rate: 125 mL/hr (03/13/25 1526)      PRN medications      Results from last 7 days   Lab Units 03/13/25  1214   WBC AUTO x10*3/uL 15.5*   HEMOGLOBIN g/dL 12.7   HEMATOCRIT % 34.3*   PLATELETS AUTO x10*3/uL 138*     Results from last 7 days   Lab Units 03/13/25  1214   SODIUM mmol/L 131*   POTASSIUM mmol/L 3.5   CHLORIDE mmol/L 99   CO2 mmol/L 20*   BUN mg/dL 96*   CREATININE mg/dL 6.06*   CALCIUM mg/dL 7.2*   PROTEIN TOTAL g/dL 5.7*   BILIRUBIN TOTAL mg/dL 2.3*   ALK PHOS U/L 102   ALT U/L 24   AST U/L 31   GLUCOSE mg/dL 115*            CT abdomen pelvis wo IV contrast   Final Result   Status post right nephrectomy.  Left-sided hydronephrosis.  There is a   double-J ureteral stent present with the proximal J in the proximal   left ureter.   Thickening of the wall of the urinary bladder.  This can be seen with   cystitis and correlation with the patient's urinalysis is recommended.   Left lower lobe infiltrate.   Signed by Isaías Vieira MD      XR chest 1 view   Final Result   Minimal platelike atelectasis in the left lung base.  Hypoventilatory   appearance of the lungs.   If there is persistent clinical suspicion for pneumonia would   recommend short-term follow-up PA and lateral chest x-ray to   reevaluate.   Signed by Jonnathan Lundberg DO          Physical Exam      Constitutional   General appearance: Alert and in no acute distress.   Eyes   Inspection of eyes: Sclera and conjunctiva were normal.    Pupil exam: Pupils were equal in size. Extraocular movements were intact.   Pulmonary   Respiratory assessment: No respiratory distress, normal  respiratory rhythm and effort.    Auscultation of Lungs: Clear bilateral breath sounds.   Cardiovascular   Auscultation of heart: Apical pulse normal, heart rate and rhythm normal, normal S1 and S2, no murmurs and no pericardial rub.    Exam for edema: No peripheral edema.   Abdomen   Abdominal Exam: No bruits, normal bowel sounds, soft, non-tender, no abdominal mass palpated.    Liver and Spleen exam: No hepato-splenomegaly.   Musculoskeletal   Examination of gait: Normal.    Inspection of digits and nails: No clubbing or cyanosis of the fingernails.    Inspection/palpation of joints, bones and muscles: No joint swelling. Normal movement of all extremities.   Skin   Skin inspection: Normal skin color and pigmentation, normal skin turgor and no visible rash.   Neurologic   Cranial nerves: Nerves 2-12 were intact, no focal neuro defects.   Psychiatric   Orientation: Oriented to person, place, and time.    Mood and affect: Normal.      Assessment/Plan      #Acute kidney injury  #Hydronephrosis  #UTI  #Hypertension  Holding medications  Plan is to take the patient to the OR tomorrow with urology  Starting IV antibiotics

## 2025-03-13 NOTE — H&P (VIEW-ONLY)
"Reason For Consult  \"Hydronephrosis/ dislodged stent\"    History Of Present Illness  Paula Campbell is a 59 y.o. female presenting with nonspecific abdominal pains, nausea, vomiting.  She said this started around 3 days ago.  She has history of malignant vaginal neoplasm for which she currently receives chemotherapy for.  She sees Dr. Carbajal every 6 months for a left ureteral stent exchange.  She also has a history of a right nephrectomy around 2017.  She is not on any blood thinning medications.  She denied attention, dysuria, gross hematuria.  Urology consulted after discovery of a possibly displaced ureteral stent.     Past Medical History  She has a past medical history of Abdominal pain, LLQ (left lower quadrant) (07/18/2023), Acute UTI (07/18/2023), Anemia, Constipation (07/18/2023), hyperparathyroidism, malignant neoplasm of vagina, Hypertension, Iron deficiency anemia, Left wrist pain (07/18/2023), Neurogenic bladder, Neuropathy, Osteoporosis, Recurrent UTI, Right shoulder pain (07/18/2023), Skin irritation (07/18/2023), Stiffness of right shoulder joint (07/18/2023), Vaginal adhesions, and Vaginal cancer (Multi).    She has no past medical history of Delayed emergence from general anesthesia, Malignant hyperthermia, or PONV (postoperative nausea and vomiting).    Surgical History  She has a past surgical history that includes Vaginal mass excision (03/09/2015); Other surgical history (11/01/2017); US guided biopsy lymph node superficial (12/03/2018); and Parathyroid gland surgery.     Social History  She reports that she has never smoked. She has been exposed to tobacco smoke. She has never used smokeless tobacco. She reports current alcohol use. She reports that she does not use drugs.    Family History  Family History   Problem Relation Name Age of Onset    Endometrial cancer Mother      Hypertension Mother      Obesity Mother      Lung cancer Father      Leukemia Brother      Other (Endometrial " "carcinoma) Mother          Allergies  Sulfa (sulfonamide antibiotics), Alendronate sodium, Alendronic acid, and Clindamycin    Review of Systems  A full 10 point ROS was completed. Nothing positive other than what was mentioned in the HPI.     Physical Exam  PE:  Constitutional: A&Ox3, calm and cooperative, NAD  Cardiovascular: Normal rate and regular rhythm.  Respiratory/Thorax: Good symmetric chest expansion. No labored breathing.  Gastrointestinal: Abdomen nondistended, soft, nontender  Genitourinary: Voiding independently   Extremities: No peripheral edema  Neurological: A&Ox3, No focal deficits  Psychological: Appropriate mood and behavior  Skin: Warm and dry       Last Recorded Vitals  Blood pressure (!) 134/94, pulse (!) 113, temperature 37.7 °C (99.9 °F), temperature source Temporal, resp. rate 18, height 1.702 m (5' 7\"), weight 88.5 kg (195 lb), SpO2 98%.    Relevant Results  Scheduled medications  azithromycin, 500 mg, intravenous, q24h  cefTRIAXone, 2 g, intravenous, q24h  denosumab, 60 mg, subcutaneous, q6 months      Continuous medications  sodium chloride 0.9%, 125 mL/hr, Last Rate: 125 mL/hr (03/13/25 1526)      PRN medications    Results for orders placed or performed during the hospital encounter of 03/13/25 (from the past 24 hours)   Sars-CoV-2 and Influenza A/B PCR   Result Value Ref Range    Flu A Result Not Detected Not Detected    Flu B Result Not Detected Not Detected    Coronavirus 2019, PCR Not Detected Not Detected   Comprehensive metabolic panel   Result Value Ref Range    Glucose 115 (H) 74 - 99 mg/dL    Sodium 131 (L) 136 - 145 mmol/L    Potassium 3.5 3.5 - 5.3 mmol/L    Chloride 99 98 - 107 mmol/L    Bicarbonate 20 (L) 21 - 32 mmol/L    Anion Gap 16 10 - 20 mmol/L    Urea Nitrogen 96 (HH) 6 - 23 mg/dL    Creatinine 6.06 (H) 0.50 - 1.05 mg/dL    eGFR 8 (L) >60 mL/min/1.73m*2    Calcium 7.2 (L) 8.6 - 10.3 mg/dL    Albumin 3.0 (L) 3.4 - 5.0 g/dL    Alkaline Phosphatase 102 33 - 110 U/L    " Total Protein 5.7 (L) 6.4 - 8.2 g/dL    AST 31 9 - 39 U/L    Bilirubin, Total 2.3 (H) 0.0 - 1.2 mg/dL    ALT 24 7 - 45 U/L   CBC and Auto Differential   Result Value Ref Range    WBC 15.5 (H) 4.4 - 11.3 x10*3/uL    nRBC 0.0 0.0 - 0.0 /100 WBCs    RBC 4.28 4.00 - 5.20 x10*6/uL    Hemoglobin 12.7 12.0 - 16.0 g/dL    Hematocrit 34.3 (L) 36.0 - 46.0 %    MCV 80 80 - 100 fL    MCH 29.7 26.0 - 34.0 pg    MCHC 37.0 (H) 32.0 - 36.0 g/dL    RDW 14.0 11.5 - 14.5 %    Platelets 138 (L) 150 - 450 x10*3/uL    Neutrophils % 83.4 40.0 - 80.0 %    Immature Granulocytes %, Automated 1.0 (H) 0.0 - 0.9 %    Lymphocytes % 5.2 13.0 - 44.0 %    Monocytes % 9.9 2.0 - 10.0 %    Eosinophils % 0.2 0.0 - 6.0 %    Basophils % 0.3 0.0 - 2.0 %    Neutrophils Absolute 12.94 (H) 1.20 - 7.70 x10*3/uL    Immature Granulocytes Absolute, Automated 0.16 0.00 - 0.70 x10*3/uL    Lymphocytes Absolute 0.81 (L) 1.20 - 4.80 x10*3/uL    Monocytes Absolute 1.54 (H) 0.10 - 1.00 x10*3/uL    Eosinophils Absolute 0.03 0.00 - 0.70 x10*3/uL    Basophils Absolute 0.04 0.00 - 0.10 x10*3/uL     *Note: Due to a large number of results and/or encounters for the requested time period, some results have not been displayed. A complete set of results can be found in Results Review.     CT abdomen pelvis wo IV contrast   Final Result   Status post right nephrectomy.  Left-sided hydronephrosis.  There is a   double-J ureteral stent present with the proximal J in the proximal   left ureter.   Thickening of the wall of the urinary bladder.  This can be seen with   cystitis and correlation with the patient's urinalysis is recommended.   Left lower lobe infiltrate.   Signed by Isaías Vieira MD      XR chest 1 view   Final Result   Minimal platelike atelectasis in the left lung base.  Hypoventilatory   appearance of the lungs.   If there is persistent clinical suspicion for pneumonia would   recommend short-term follow-up PA and lateral chest x-ray to   reevaluate.   Signed by Jonnathan  Toño,         Assessment/Plan     Patient with left-sided hydronephrosis with elevated creatinine and leukocytosis likely from a displaced ureteral stent.  She is fairly comfortable in there and does not appear in acute distress.  She is hemodynamically stable this afternoon. I discussed her case with Dr. Georgie Vega.  We will place her on the OR schedule tomorrow with Dr. Hammond for stent exchange, retrograde pyelogram.  She may eat today and be NPO MN.  Please admit to the medicine service with initiation of broad-spectrum antibiotics.    I spent 60 minutes in the professional and overall care of this patient.    Kayden Donaldson PA-C

## 2025-03-13 NOTE — CONSULTS
"Reason For Consult  \"Hydronephrosis/ dislodged stent\"    History Of Present Illness  Paula Campbell is a 59 y.o. female presenting with nonspecific abdominal pains, nausea, vomiting.  She said this started around 3 days ago.  She has history of malignant vaginal neoplasm for which she currently receives chemotherapy for.  She sees Dr. Carbajal every 6 months for a left ureteral stent exchange.  She also has a history of a right nephrectomy around 2017.  She is not on any blood thinning medications.  She denied attention, dysuria, gross hematuria.  Urology consulted after discovery of a possibly displaced ureteral stent.     Past Medical History  She has a past medical history of Abdominal pain, LLQ (left lower quadrant) (07/18/2023), Acute UTI (07/18/2023), Anemia, Constipation (07/18/2023), hyperparathyroidism, malignant neoplasm of vagina, Hypertension, Iron deficiency anemia, Left wrist pain (07/18/2023), Neurogenic bladder, Neuropathy, Osteoporosis, Recurrent UTI, Right shoulder pain (07/18/2023), Skin irritation (07/18/2023), Stiffness of right shoulder joint (07/18/2023), Vaginal adhesions, and Vaginal cancer (Multi).    She has no past medical history of Delayed emergence from general anesthesia, Malignant hyperthermia, or PONV (postoperative nausea and vomiting).    Surgical History  She has a past surgical history that includes Vaginal mass excision (03/09/2015); Other surgical history (11/01/2017); US guided biopsy lymph node superficial (12/03/2018); and Parathyroid gland surgery.     Social History  She reports that she has never smoked. She has been exposed to tobacco smoke. She has never used smokeless tobacco. She reports current alcohol use. She reports that she does not use drugs.    Family History  Family History   Problem Relation Name Age of Onset    Endometrial cancer Mother      Hypertension Mother      Obesity Mother      Lung cancer Father      Leukemia Brother      Other (Endometrial " "carcinoma) Mother          Allergies  Sulfa (sulfonamide antibiotics), Alendronate sodium, Alendronic acid, and Clindamycin    Review of Systems  A full 10 point ROS was completed. Nothing positive other than what was mentioned in the HPI.     Physical Exam  PE:  Constitutional: A&Ox3, calm and cooperative, NAD  Cardiovascular: Normal rate and regular rhythm.  Respiratory/Thorax: Good symmetric chest expansion. No labored breathing.  Gastrointestinal: Abdomen nondistended, soft, nontender  Genitourinary: Voiding independently   Extremities: No peripheral edema  Neurological: A&Ox3, No focal deficits  Psychological: Appropriate mood and behavior  Skin: Warm and dry       Last Recorded Vitals  Blood pressure (!) 134/94, pulse (!) 113, temperature 37.7 °C (99.9 °F), temperature source Temporal, resp. rate 18, height 1.702 m (5' 7\"), weight 88.5 kg (195 lb), SpO2 98%.    Relevant Results  Scheduled medications  azithromycin, 500 mg, intravenous, q24h  cefTRIAXone, 2 g, intravenous, q24h  denosumab, 60 mg, subcutaneous, q6 months      Continuous medications  sodium chloride 0.9%, 125 mL/hr, Last Rate: 125 mL/hr (03/13/25 1526)      PRN medications    Results for orders placed or performed during the hospital encounter of 03/13/25 (from the past 24 hours)   Sars-CoV-2 and Influenza A/B PCR   Result Value Ref Range    Flu A Result Not Detected Not Detected    Flu B Result Not Detected Not Detected    Coronavirus 2019, PCR Not Detected Not Detected   Comprehensive metabolic panel   Result Value Ref Range    Glucose 115 (H) 74 - 99 mg/dL    Sodium 131 (L) 136 - 145 mmol/L    Potassium 3.5 3.5 - 5.3 mmol/L    Chloride 99 98 - 107 mmol/L    Bicarbonate 20 (L) 21 - 32 mmol/L    Anion Gap 16 10 - 20 mmol/L    Urea Nitrogen 96 (HH) 6 - 23 mg/dL    Creatinine 6.06 (H) 0.50 - 1.05 mg/dL    eGFR 8 (L) >60 mL/min/1.73m*2    Calcium 7.2 (L) 8.6 - 10.3 mg/dL    Albumin 3.0 (L) 3.4 - 5.0 g/dL    Alkaline Phosphatase 102 33 - 110 U/L    " Total Protein 5.7 (L) 6.4 - 8.2 g/dL    AST 31 9 - 39 U/L    Bilirubin, Total 2.3 (H) 0.0 - 1.2 mg/dL    ALT 24 7 - 45 U/L   CBC and Auto Differential   Result Value Ref Range    WBC 15.5 (H) 4.4 - 11.3 x10*3/uL    nRBC 0.0 0.0 - 0.0 /100 WBCs    RBC 4.28 4.00 - 5.20 x10*6/uL    Hemoglobin 12.7 12.0 - 16.0 g/dL    Hematocrit 34.3 (L) 36.0 - 46.0 %    MCV 80 80 - 100 fL    MCH 29.7 26.0 - 34.0 pg    MCHC 37.0 (H) 32.0 - 36.0 g/dL    RDW 14.0 11.5 - 14.5 %    Platelets 138 (L) 150 - 450 x10*3/uL    Neutrophils % 83.4 40.0 - 80.0 %    Immature Granulocytes %, Automated 1.0 (H) 0.0 - 0.9 %    Lymphocytes % 5.2 13.0 - 44.0 %    Monocytes % 9.9 2.0 - 10.0 %    Eosinophils % 0.2 0.0 - 6.0 %    Basophils % 0.3 0.0 - 2.0 %    Neutrophils Absolute 12.94 (H) 1.20 - 7.70 x10*3/uL    Immature Granulocytes Absolute, Automated 0.16 0.00 - 0.70 x10*3/uL    Lymphocytes Absolute 0.81 (L) 1.20 - 4.80 x10*3/uL    Monocytes Absolute 1.54 (H) 0.10 - 1.00 x10*3/uL    Eosinophils Absolute 0.03 0.00 - 0.70 x10*3/uL    Basophils Absolute 0.04 0.00 - 0.10 x10*3/uL     *Note: Due to a large number of results and/or encounters for the requested time period, some results have not been displayed. A complete set of results can be found in Results Review.     CT abdomen pelvis wo IV contrast   Final Result   Status post right nephrectomy.  Left-sided hydronephrosis.  There is a   double-J ureteral stent present with the proximal J in the proximal   left ureter.   Thickening of the wall of the urinary bladder.  This can be seen with   cystitis and correlation with the patient's urinalysis is recommended.   Left lower lobe infiltrate.   Signed by Isaías Vieira MD      XR chest 1 view   Final Result   Minimal platelike atelectasis in the left lung base.  Hypoventilatory   appearance of the lungs.   If there is persistent clinical suspicion for pneumonia would   recommend short-term follow-up PA and lateral chest x-ray to   reevaluate.   Signed by Jonnathan  Toño,         Assessment/Plan     Patient with left-sided hydronephrosis with elevated creatinine and leukocytosis likely from a displaced ureteral stent.  She is fairly comfortable in there and does not appear in acute distress.  She is hemodynamically stable this afternoon. I discussed her case with Dr. Georgie Vega.  We will place her on the OR schedule tomorrow with Dr. Hammond for stent exchange, retrograde pyelogram.  She may eat today and be NPO MN.  Please admit to the medicine service with initiation of broad-spectrum antibiotics.    I spent 60 minutes in the professional and overall care of this patient.    Kayden Donaldson PA-C     For information on Fall & Injury Prevention, visit: [email or web address]  [email or web address] OR  https://www.cdc.gov/steadi/patient.html

## 2025-03-13 NOTE — PROGRESS NOTES
Pharmacy Medication History     Source of Information: Per patient     Additional concerns with the patient's PTA list.   N/a  The following updates were made to the Prior to Admission medication list:     Medications ADDED:   N/a  Medications CHANGED:  N/a  Medications REMOVED:   Alfuzosin   Macrobid   Medications NOT TAKING:   B-12    Allergy reviewed : Yes    Meds 2 Beds : No    Outpatient pharmacy confirmed and updated in chart : Yes    Pharmacy name: [facility]    The list below reflectives the updated PTA list. Please review each medication in order reconciliation for additional clarification and justification.    Prior to Admission Medications   Prescriptions Last Dose Informant Taking?   acyclovir (Zovirax) 400 mg tablet 3/12/2025  Yes   Sig: TAKE 1 TABLET (400 MG) BY MOUTH TWICE A DAY   calcium carbonate-vitamin D3 (Caltrate with Vitamin D3) 600 mg-20 mcg (800 unit) tablet 3/12/2025  Yes   Sig: Take 1 tablet by mouth once daily.   cholecalciferol (Vitamin D-3) 50 mcg (2,000 unit) capsule 3/12/2025  Yes   Sig: Take 2 capsules (100 mcg) by mouth once daily.               denosumab (Prolia) 60 mg/mL syringe   No   Sig: Physician's office to inject 60mg (1 prefilled syringe) beneath the skin once every 6 months. For office use only.   docusate sodium (Colace) 100 mg capsule   No   Sig: Take 1 capsule (100 mg) by mouth 2 times a day as needed for constipation.   hydroCHLOROthiazide (HYDRODiuril) 25 mg tablet 3/12/2025  Yes   Sig: Take 1 tablet (25 mg) by mouth once daily.   polyethylene glycol (Glycolax, Miralax) 17 gram/dose powder   No   Sig: Mix 17 g of powder and drink once daily as needed for constipation.      Facility-Administered Medications Last Administration Doses Remaining   denosumab (Prolia) injection 60 mg None recorded 2          The list below reflectives the updated allergy list. Please review each documented allergy for additional clarification and justification.    Allergies   Allergen  Reactions    Sulfa (Sulfonamide Antibiotics) Hives and Fever    Alendronate Sodium Rash    Alendronic Acid Rash    Clindamycin Itching, Rash and Hives          03/13/25 at 3:37 PM - Clarissa Parker

## 2025-03-13 NOTE — ED PROVIDER NOTES
HPI   Chief Complaint   Patient presents with   • Abdominal Pain   • Vomiting   • Cough       This is a 59-year-old female who presents to the emergency department complaining of abdominal pain, vomiting, diarrhea and cough.  The patient reports that the symptoms started about 5 days ago.  She has been unable to keep anything down for the past 2 days.  She reports upper abdominal pain when she coughs.  Her cough is mostly nonproductive.  She reports fever 5 days ago.    Past medical history: Vaginal cancer s/p radiation and chemotherapy, hypertension, ureteral obstruction with left stent, right nephrectomy            Patient History   Past Medical History:   Diagnosis Date   • Abdominal pain, LLQ (left lower quadrant) 07/18/2023   • Acute UTI 07/18/2023   • Anemia    • Constipation 07/18/2023   • Hx of hyperparathyroidism    • Hx of malignant neoplasm of vagina    • Hypertension    • Iron deficiency anemia    • Left wrist pain 07/18/2023   • Neurogenic bladder    • Neuropathy    • Osteoporosis    • Recurrent UTI    • Right shoulder pain 07/18/2023   • Skin irritation 07/18/2023   • Stiffness of right shoulder joint 07/18/2023   • Vaginal adhesions    • Vaginal cancer (Multi)      Past Surgical History:   Procedure Laterality Date   • OTHER SURGICAL HISTORY  11/01/2017    Nephrectomy Right   • PARATHYROID GLAND SURGERY     • US GUIDED BIOPSY LYMPH NODE SUPERFICIAL  12/03/2018    US GUIDED BIOPSY LYMPH NODE SUPERFICIAL 12/3/2018 [facility]   • VAGINAL MASS EXCISION  03/09/2015    Excision Of Vaginal Cyst Or Tumor     Family History   Problem Relation Name Age of Onset   • Endometrial cancer Mother     • Hypertension Mother     • Obesity Mother     • Lung cancer Father     • Leukemia Brother     • Other (Endometrial carcinoma) Mother       Social History     Tobacco Use   • Smoking status: Never     Passive exposure: Past   • Smokeless tobacco: Never   Vaping Use   • Vaping status: Never Used   Substance Use Topics    • Alcohol use: Yes     Comment: social LESS THAN 1 X WEEK   • Drug use: Never       Physical Exam   ED Triage Vitals [03/13/25 1055]   Temperature Heart Rate Respirations BP   37.7 °C (99.9 °F) (!) 113 18 122/79      Pulse Ox Temp Source Heart Rate Source Patient Position   99 % Temporal Monitor Sitting      BP Location FiO2 (%)     Right arm --       Physical Exam  Vitals and nursing note reviewed.   HENT:      Head: Normocephalic and atraumatic.      Nose: Nose normal.   Eyes:      Conjunctiva/sclera: Conjunctivae normal.   Cardiovascular:      Rate and Rhythm: Normal rate and regular rhythm.      Pulses: Normal pulses.      Heart sounds: Normal heart sounds.   Pulmonary:      Effort: Pulmonary effort is normal.      Breath sounds: Normal breath sounds.   Abdominal:      General: Bowel sounds are normal.      Palpations: Abdomen is soft.      Tenderness: There is abdominal tenderness in the epigastric area.   Musculoskeletal:         General: Normal range of motion.      Cervical back: Normal range of motion and neck supple.   Skin:     Findings: No rash.   Neurological:      General: No focal deficit present.      Mental Status: She is alert and oriented to person, place, and time.   Psychiatric:         Mood and Affect: Mood normal.         ED Course & MDM   Diagnoses as of 03/13/25 1540   Acute renal failure, unspecified acute renal failure type (CMS-HCC)   Obstructive uropathy   Pneumonia of left lower lobe due to infectious organism                 No data recorded                                 Medical Decision Making  Differential diagnosis: I have considered the following conditions during my assessment of this patient's condition: Gastritis, gastroenteritis, GERD, food poisoning, ileus, bowel obstruction, hernia, flu, pneumonia, COVID, cholecystitis, diverticulitis, colitis, renal colic.    This is a 59-year-old female who presents to the emergency department complaining of cough, vomiting and  abdominal pain.  The patient was evaluated with labs, chest x-ray and viral swabs.  She was treated with IV fluids.  White blood count was elevated at 15.5.  CT abdomen/pelvis was ordered.  The patient was found to have an elevated creatinine at 6.  Her previous creatinine 3 months ago was 0.99.  CT scan showed left hydronephrosis and a displaced ureteral stent.  The CT also showed a left lower lobe infiltrate.  The patient was treated with ceftriaxone and azithromycin.  Called her urologist, Dr. Carbajal.  Spoke to his partner Dr. Calvert.  She is not seeing patients at [facility].  The patient can either be seen by urology here or transfer to [facility].  The patient declines transfer to [facility].  Spoke to the surgical [facility] for urology consult.  Spoke to Dr. Ellis who accepted the patient for admission.        Procedure  Critical Care    Performed by: Justen Gupta MD  Authorized by: Justen Gupta MD    Critical care provider statement:     Critical care time (minutes):  35    Critical care time was exclusive of:  Separately billable procedures and treating other patients    Critical care was necessary to treat or prevent imminent or life-threatening deterioration of the following conditions: renal failure.    Critical care was time spent personally by me on the following activities:  Development of treatment plan with patient or surrogate, discussions with consultants, discussions with primary provider, examination of patient, ordering and performing treatments and interventions, ordering and review of laboratory studies, ordering and review of radiographic studies and review of old charts    Care discussed with: admitting provider       Justen Gupta MD  03/13/25 8031

## 2025-03-14 ENCOUNTER — APPOINTMENT (OUTPATIENT)
Dept: RADIOLOGY | Facility: HOSPITAL | Age: 60
DRG: 659 | End: 2025-03-14
Payer: MEDICARE

## 2025-03-14 ENCOUNTER — ANESTHESIA EVENT (OUTPATIENT)
Dept: OPERATING ROOM | Facility: HOSPITAL | Age: 60
End: 2025-03-14
Payer: MEDICARE

## 2025-03-14 ENCOUNTER — ANESTHESIA (OUTPATIENT)
Dept: OPERATING ROOM | Facility: HOSPITAL | Age: 60
End: 2025-03-14
Payer: MEDICARE

## 2025-03-14 LAB
ANION GAP SERPL CALC-SCNC: 18 MMOL/L (ref 10–20)
APPEARANCE UR: ABNORMAL
BACTERIA #/AREA URNS AUTO: ABNORMAL /HPF
BILIRUB UR STRIP.AUTO-MCNC: NEGATIVE MG/DL
BUN SERPL-MCNC: 93 MG/DL (ref 6–23)
CALCIUM SERPL-MCNC: 7.1 MG/DL (ref 8.6–10.3)
CHLORIDE SERPL-SCNC: 103 MMOL/L (ref 98–107)
CO2 SERPL-SCNC: 17 MMOL/L (ref 21–32)
COLOR UR: YELLOW
CREAT SERPL-MCNC: 5.75 MG/DL (ref 0.5–1.05)
EGFRCR SERPLBLD CKD-EPI 2021: 8 ML/MIN/1.73M*2
ERYTHROCYTE [DISTWIDTH] IN BLOOD BY AUTOMATED COUNT: 14.6 % (ref 11.5–14.5)
GLUCOSE SERPL-MCNC: 93 MG/DL (ref 74–99)
GLUCOSE UR STRIP.AUTO-MCNC: NORMAL MG/DL
HCT VFR BLD AUTO: 35.1 % (ref 36–46)
HGB BLD-MCNC: 12.4 G/DL (ref 12–16)
HOLD SPECIMEN: NORMAL
KETONES UR STRIP.AUTO-MCNC: NEGATIVE MG/DL
LEUKOCYTE ESTERASE UR QL STRIP.AUTO: ABNORMAL
MCH RBC QN AUTO: 28.8 PG (ref 26–34)
MCHC RBC AUTO-ENTMCNC: 35.3 G/DL (ref 32–36)
MCV RBC AUTO: 82 FL (ref 80–100)
NITRITE UR QL STRIP.AUTO: NEGATIVE
NRBC BLD-RTO: 0 /100 WBCS (ref 0–0)
PH UR STRIP.AUTO: 7 [PH]
PLATELET # BLD AUTO: 143 X10*3/UL (ref 150–450)
POTASSIUM SERPL-SCNC: 3.6 MMOL/L (ref 3.5–5.3)
PROT UR STRIP.AUTO-MCNC: ABNORMAL MG/DL
RBC # BLD AUTO: 4.3 X10*6/UL (ref 4–5.2)
RBC # UR STRIP.AUTO: ABNORMAL MG/DL
RBC #/AREA URNS AUTO: >20 /HPF
SODIUM SERPL-SCNC: 134 MMOL/L (ref 136–145)
SP GR UR STRIP.AUTO: 1.01
SQUAMOUS #/AREA URNS AUTO: ABNORMAL /HPF
UROBILINOGEN UR STRIP.AUTO-MCNC: ABNORMAL MG/DL
WBC # BLD AUTO: 13.1 X10*3/UL (ref 4.4–11.3)
WBC #/AREA URNS AUTO: >50 /HPF
WBC CLUMPS #/AREA URNS AUTO: ABNORMAL /HPF

## 2025-03-14 PROCEDURE — 36415 COLL VENOUS BLD VENIPUNCTURE: CPT

## 2025-03-14 PROCEDURE — 81001 URINALYSIS AUTO W/SCOPE: CPT | Performed by: EMERGENCY MEDICINE

## 2025-03-14 PROCEDURE — 0TP98DZ REMOVAL OF INTRALUMINAL DEVICE FROM URETER, VIA NATURAL OR ARTIFICIAL OPENING ENDOSCOPIC: ICD-10-PCS | Performed by: STUDENT IN AN ORGANIZED HEALTH CARE EDUCATION/TRAINING PROGRAM

## 2025-03-14 PROCEDURE — 2500000004 HC RX 250 GENERAL PHARMACY W/ HCPCS (ALT 636 FOR OP/ED)

## 2025-03-14 PROCEDURE — 1200000002 HC GENERAL ROOM WITH TELEMETRY DAILY

## 2025-03-14 PROCEDURE — C1769 GUIDE WIRE: HCPCS | Performed by: UROLOGY

## 2025-03-14 PROCEDURE — 7100000001 HC RECOVERY ROOM TIME - INITIAL BASE CHARGE: Performed by: UROLOGY

## 2025-03-14 PROCEDURE — 2780000003 HC OR 278 NO HCPCS: Performed by: UROLOGY

## 2025-03-14 PROCEDURE — 3600000003 HC OR TIME - INITIAL BASE CHARGE - PROCEDURE LEVEL THREE: Performed by: UROLOGY

## 2025-03-14 PROCEDURE — 76000 FLUOROSCOPY <1 HR PHYS/QHP: CPT | Mod: LT

## 2025-03-14 PROCEDURE — 85027 COMPLETE CBC AUTOMATED: CPT

## 2025-03-14 PROCEDURE — 0T778DZ DILATION OF LEFT URETER WITH INTRALUMINAL DEVICE, VIA NATURAL OR ARTIFICIAL OPENING ENDOSCOPIC: ICD-10-PCS | Performed by: STUDENT IN AN ORGANIZED HEALTH CARE EDUCATION/TRAINING PROGRAM

## 2025-03-14 PROCEDURE — 3700000002 HC GENERAL ANESTHESIA TIME - EACH INCREMENTAL 1 MINUTE: Performed by: UROLOGY

## 2025-03-14 PROCEDURE — 52332 CYSTOSCOPY AND TREATMENT: CPT | Performed by: STUDENT IN AN ORGANIZED HEALTH CARE EDUCATION/TRAINING PROGRAM

## 2025-03-14 PROCEDURE — 51702 INSERT TEMP BLADDER CATH: CPT

## 2025-03-14 PROCEDURE — 2500000004 HC RX 250 GENERAL PHARMACY W/ HCPCS (ALT 636 FOR OP/ED): Performed by: EMERGENCY MEDICINE

## 2025-03-14 PROCEDURE — 2720000007 HC OR 272 NO HCPCS: Performed by: UROLOGY

## 2025-03-14 PROCEDURE — 99232 SBSQ HOSP IP/OBS MODERATE 35: CPT | Performed by: INTERNAL MEDICINE

## 2025-03-14 PROCEDURE — 7100000002 HC RECOVERY ROOM TIME - EACH INCREMENTAL 1 MINUTE: Performed by: UROLOGY

## 2025-03-14 PROCEDURE — 3700000001 HC GENERAL ANESTHESIA TIME - INITIAL BASE CHARGE: Performed by: UROLOGY

## 2025-03-14 PROCEDURE — C2617 STENT, NON-COR, TEM W/O DEL: HCPCS | Performed by: UROLOGY

## 2025-03-14 PROCEDURE — 80048 BASIC METABOLIC PNL TOTAL CA: CPT

## 2025-03-14 PROCEDURE — 87086 URINE CULTURE/COLONY COUNT: CPT | Mod: AHULAB | Performed by: EMERGENCY MEDICINE

## 2025-03-14 PROCEDURE — BT1F1ZZ FLUOROSCOPY OF LEFT KIDNEY, URETER AND BLADDER USING LOW OSMOLAR CONTRAST: ICD-10-PCS | Performed by: STUDENT IN AN ORGANIZED HEALTH CARE EDUCATION/TRAINING PROGRAM

## 2025-03-14 PROCEDURE — 74420 UROGRAPHY RTRGR +-KUB: CPT | Performed by: STUDENT IN AN ORGANIZED HEALTH CARE EDUCATION/TRAINING PROGRAM

## 2025-03-14 PROCEDURE — 2500000004 HC RX 250 GENERAL PHARMACY W/ HCPCS (ALT 636 FOR OP/ED): Performed by: ANESTHESIOLOGIST ASSISTANT

## 2025-03-14 PROCEDURE — 3600000008 HC OR TIME - EACH INCREMENTAL 1 MINUTE - PROCEDURE LEVEL THREE: Performed by: UROLOGY

## 2025-03-14 DEVICE — STENT, TRIA URETHERAL, SOFT, 6 X  26: Type: IMPLANTABLE DEVICE | Site: URETER | Status: FUNCTIONAL

## 2025-03-14 RX ORDER — LIDOCAINE HYDROCHLORIDE 10 MG/ML
0.1 INJECTION, SOLUTION EPIDURAL; INFILTRATION; INTRACAUDAL; PERINEURAL ONCE
Status: CANCELLED | OUTPATIENT
Start: 2025-03-14 | End: 2025-03-14

## 2025-03-14 RX ORDER — METOCLOPRAMIDE HYDROCHLORIDE 5 MG/ML
10 INJECTION INTRAMUSCULAR; INTRAVENOUS ONCE AS NEEDED
Status: CANCELLED | OUTPATIENT
Start: 2025-03-14

## 2025-03-14 RX ORDER — ONDANSETRON HYDROCHLORIDE 2 MG/ML
4 INJECTION, SOLUTION INTRAVENOUS ONCE AS NEEDED
Status: CANCELLED | OUTPATIENT
Start: 2025-03-14

## 2025-03-14 RX ORDER — LIDOCAINE HYDROCHLORIDE 20 MG/ML
INJECTION, SOLUTION EPIDURAL; INFILTRATION; INTRACAUDAL; PERINEURAL AS NEEDED
Status: DISCONTINUED | OUTPATIENT
Start: 2025-03-14 | End: 2025-03-14

## 2025-03-14 RX ORDER — FENTANYL CITRATE 50 UG/ML
INJECTION, SOLUTION INTRAMUSCULAR; INTRAVENOUS AS NEEDED
Status: DISCONTINUED | OUTPATIENT
Start: 2025-03-14 | End: 2025-03-14

## 2025-03-14 RX ORDER — OXYCODONE HYDROCHLORIDE 5 MG/1
5 TABLET ORAL EVERY 4 HOURS PRN
Status: CANCELLED | OUTPATIENT
Start: 2025-03-14

## 2025-03-14 RX ORDER — PROPOFOL 10 MG/ML
INJECTION, EMULSION INTRAVENOUS AS NEEDED
Status: DISCONTINUED | OUTPATIENT
Start: 2025-03-14 | End: 2025-03-14

## 2025-03-14 RX ORDER — MIDAZOLAM HYDROCHLORIDE 1 MG/ML
INJECTION INTRAMUSCULAR; INTRAVENOUS AS NEEDED
Status: DISCONTINUED | OUTPATIENT
Start: 2025-03-14 | End: 2025-03-14

## 2025-03-14 RX ORDER — ONDANSETRON HYDROCHLORIDE 2 MG/ML
INJECTION, SOLUTION INTRAVENOUS AS NEEDED
Status: DISCONTINUED | OUTPATIENT
Start: 2025-03-14 | End: 2025-03-14

## 2025-03-14 RX ORDER — KETOROLAC TROMETHAMINE 30 MG/ML
INJECTION, SOLUTION INTRAMUSCULAR; INTRAVENOUS AS NEEDED
Status: DISCONTINUED | OUTPATIENT
Start: 2025-03-14 | End: 2025-03-14

## 2025-03-14 RX ORDER — SODIUM CHLORIDE, SODIUM LACTATE, POTASSIUM CHLORIDE, CALCIUM CHLORIDE 600; 310; 30; 20 MG/100ML; MG/100ML; MG/100ML; MG/100ML
100 INJECTION, SOLUTION INTRAVENOUS CONTINUOUS
Status: CANCELLED | OUTPATIENT
Start: 2025-03-14 | End: 2025-03-15

## 2025-03-14 RX ORDER — CEFAZOLIN 1 G/1
INJECTION, POWDER, FOR SOLUTION INTRAVENOUS AS NEEDED
Status: DISCONTINUED | OUTPATIENT
Start: 2025-03-14 | End: 2025-03-14

## 2025-03-14 RX ORDER — FENTANYL CITRATE 50 UG/ML
50 INJECTION, SOLUTION INTRAMUSCULAR; INTRAVENOUS EVERY 5 MIN PRN
Status: CANCELLED | OUTPATIENT
Start: 2025-03-14

## 2025-03-14 RX ADMIN — MIDAZOLAM HYDROCHLORIDE 2 MG: 1 INJECTION, SOLUTION INTRAMUSCULAR; INTRAVENOUS at 11:49

## 2025-03-14 RX ADMIN — LIDOCAINE HYDROCHLORIDE 100 MG: 20 INJECTION, SOLUTION EPIDURAL; INFILTRATION; INTRACAUDAL; PERINEURAL at 11:55

## 2025-03-14 RX ADMIN — CEFAZOLIN 2 G: 330 INJECTION, POWDER, FOR SOLUTION INTRAMUSCULAR; INTRAVENOUS at 12:04

## 2025-03-14 RX ADMIN — PROPOFOL 50 MG: 10 INJECTION, EMULSION INTRAVENOUS at 12:00

## 2025-03-14 RX ADMIN — SODIUM CHLORIDE 125 ML/HR: 9 INJECTION, SOLUTION INTRAVENOUS at 05:15

## 2025-03-14 RX ADMIN — PROPOFOL 50 MG: 10 INJECTION, EMULSION INTRAVENOUS at 12:10

## 2025-03-14 RX ADMIN — FENTANYL CITRATE 100 MCG: 50 INJECTION, SOLUTION INTRAMUSCULAR; INTRAVENOUS at 11:55

## 2025-03-14 RX ADMIN — PROPOFOL 250 MG: 10 INJECTION, EMULSION INTRAVENOUS at 11:55

## 2025-03-14 RX ADMIN — PROPOFOL 50 MG: 10 INJECTION, EMULSION INTRAVENOUS at 12:06

## 2025-03-14 RX ADMIN — HEPARIN SODIUM 5000 UNITS: 5000 INJECTION, SOLUTION INTRAVENOUS; SUBCUTANEOUS at 21:09

## 2025-03-14 RX ADMIN — SODIUM CHLORIDE, SODIUM LACTATE, POTASSIUM CHLORIDE, AND CALCIUM CHLORIDE: .6; .31; .03; .02 INJECTION, SOLUTION INTRAVENOUS at 11:49

## 2025-03-14 RX ADMIN — CEFTRIAXONE 2 G: 2 INJECTION, POWDER, FOR SOLUTION INTRAMUSCULAR; INTRAVENOUS at 14:43

## 2025-03-14 RX ADMIN — DEXAMETHASONE SODIUM PHOSPHATE 8 MG: 4 INJECTION, SOLUTION INTRA-ARTICULAR; INTRALESIONAL; INTRAMUSCULAR; INTRAVENOUS; SOFT TISSUE at 12:00

## 2025-03-14 RX ADMIN — ONDANSETRON 4 MG: 2 INJECTION INTRAMUSCULAR; INTRAVENOUS at 12:00

## 2025-03-14 RX ADMIN — AZITHROMYCIN MONOHYDRATE 500 MG: 500 INJECTION, POWDER, LYOPHILIZED, FOR SOLUTION INTRAVENOUS at 16:25

## 2025-03-14 SDOH — HEALTH STABILITY: MENTAL HEALTH: CURRENT SMOKER: 0

## 2025-03-14 ASSESSMENT — COGNITIVE AND FUNCTIONAL STATUS - GENERAL
MOBILITY SCORE: 24
DAILY ACTIVITIY SCORE: 24
DAILY ACTIVITIY SCORE: 24
MOBILITY SCORE: 24

## 2025-03-14 ASSESSMENT — PAIN SCALES - GENERAL
PAINLEVEL_OUTOF10: 0 - NO PAIN

## 2025-03-14 ASSESSMENT — PAIN - FUNCTIONAL ASSESSMENT
PAIN_FUNCTIONAL_ASSESSMENT: 0-10
PAIN_FUNCTIONAL_ASSESSMENT: UNABLE TO SELF-REPORT
PAIN_FUNCTIONAL_ASSESSMENT: 0-10
PAIN_FUNCTIONAL_ASSESSMENT: 0-10

## 2025-03-14 NOTE — CARE PLAN
The patient's goals for the shift include      The clinical goals for the shift include rest    Over the shift, the patient did not make progress toward the following goals. Barriers to progression include continued care. Recommendations to address these barriers include continuing to monitor and support as needed.

## 2025-03-14 NOTE — PROGRESS NOTES
Laurence Campbell is a 59 y.o. female     Patient is s/p cystoscopy with retrograde pyelogram and ureteral stent placement  Currently the Chowdhury has some blood in it but overall patient feels better    Review of Systems     Constitutional: no fever, no chills, not feeling poorly, not feeling tired   Cardiovascular: no chest pain   Respiratory: no cough, wheezing or shortness of breath a  Gastrointestinal: no abdominal pain, no constipation, no melena, no nausea, no diarrhea, no vomiting and no blood in stools.   Neurological: no headache,   All other systems have been reviewed and are negative for complaint.       Vitals:    03/14/25 1525   BP: 141/85   Pulse: 77   Resp: 18   Temp: 36.6 °C (97.9 °F)   SpO2: 96%        Scheduled medications  azithromycin, 500 mg, intravenous, q24h  cefTRIAXone, 2 g, intravenous, q24h  heparin (porcine), 5,000 Units, subcutaneous, q8h ARIANNA  pantoprazole, 40 mg, oral, Daily before breakfast   Or  pantoprazole, 40 mg, intravenous, Daily before breakfast      Continuous medications     PRN medications  PRN medications: acetaminophen **OR** acetaminophen **OR** acetaminophen, guaiFENesin, melatonin, ondansetron **OR** ondansetron, polyethylene glycol    Lab Review   Results from last 7 days   Lab Units 03/14/25 0527 03/13/25  1214   WBC AUTO x10*3/uL 13.1* 15.5*   HEMOGLOBIN g/dL 12.4 12.7   HEMATOCRIT % 35.1* 34.3*   PLATELETS AUTO x10*3/uL 143* 138*     Results from last 7 days   Lab Units 03/14/25  0527 03/13/25  1214   SODIUM mmol/L 134* 131*   POTASSIUM mmol/L 3.6 3.5   CHLORIDE mmol/L 103 99   CO2 mmol/L 17* 20*   BUN mg/dL 93* 96*   CREATININE mg/dL 5.75* 6.06*   CALCIUM mg/dL 7.1* 7.2*   PROTEIN TOTAL g/dL  --  5.7*   BILIRUBIN TOTAL mg/dL  --  2.3*   ALK PHOS U/L  --  102   ALT U/L  --  24   AST U/L  --  31   GLUCOSE mg/dL 93 115*            FL less than 1 hour   Final Result      CT abdomen pelvis wo IV contrast   Final Result   Status post right nephrectomy.  Left-sided  hydronephrosis.  There is a   double-J ureteral stent present with the proximal J in the proximal   left ureter.   Thickening of the wall of the urinary bladder.  This can be seen with   cystitis and correlation with the patient's urinalysis is recommended.   Left lower lobe infiltrate.   Signed by Isaías Vieira MD      XR chest 1 view   Final Result   Minimal platelike atelectasis in the left lung base.  Hypoventilatory   appearance of the lungs.   If there is persistent clinical suspicion for pneumonia would   recommend short-term follow-up PA and lateral chest x-ray to   reevaluate.   Signed by Jonnathan Lundberg DO            Physical Exam    Constitutional   General appearance: Alert and in no acute distress.   Pulmonary   Respiratory assessment: No respiratory distress, normal respiratory rhythm and effort.    Auscultation of Lungs: Clear bilateral breath sounds.   Cardiovascular   Auscultation of heart: Apical pulse normal, heart rate and rhythm normal, normal S1 and S2, no murmurs and no pericardial rub.    Exam for edema: No peripheral edema.   Abdomen   Abdominal Exam: No bruits, normal bowel sounds, soft, non-tender, no abdominal mass palpated.    Liver and Spleen exam: No hepato-splenomegaly.   Musculoskeletal     Inspection/palpation of joints, bones and muscles: No joint swelling. Normal movement of all extremities.   Neurologic   Cranial nerves: Nerves 2-12 were intact, no focal neuro defects.         Assessment/Plan      #Acute kidney injury  #Hydronephrosis  #UTI  #Hypertension  Kidney functions have shown some improvement s/p stent placement  Continue IV antibiotics  Continue to monitor BMP  Hematuria and Chowdhury catheter will monitor

## 2025-03-14 NOTE — ANESTHESIA POSTPROCEDURE EVALUATION
"Patient: Laurence Campbell \"Paula\"    Procedure Summary       Date: 03/14/25 Room / Location: [facility] OR 04 / Virtual [facility] OR    Anesthesia Start: 1149 Anesthesia Stop: 1228    Procedures:       Left CYSTOSCOPY, WITH URETERAL STENT INSERTION (Left)      Left CYSTOSCOPY, WITH RETROGRADE PYELOGRAM (Left) Diagnosis:       Obstructive uropathy      (Obstructive uropathy [N13.9])    Surgeons: Ez Hammond MD Responsible Provider: Jonathan Hollis MD    Anesthesia Type: general ASA Status: 2            Anesthesia Type: general    Vitals Value Taken Time   /74 03/14/25 1331   Temp 36.5 °C (97.7 °F) 03/14/25 1342   Pulse 83 03/14/25 1339   Resp 22 03/14/25 1330   SpO2 97 % 03/14/25 1340   Vitals shown include unfiled device data.    Anesthesia Post Evaluation    Patient location during evaluation: bedside  Patient participation: complete - patient participated  Level of consciousness: awake and alert  Pain management: adequate  Airway patency: patent  Cardiovascular status: acceptable  Respiratory status: acceptable  Hydration status: acceptable  Postoperative Nausea and Vomiting: none    No notable events documented.    "

## 2025-03-14 NOTE — OP NOTE
"Left CYSTOSCOPY, WITH URETERAL STENT INSERTION (L), Left CYSTOSCOPY, WITH RETROGRADE PYELOGRAM (L) Operative Note     Date: 3/14/2025  OR Location: [facility] A OR    Name: Laurence Campbell \"Jeremiah", : 1965, Age: 59 y.o., MRN: [id number], Sex: female    Diagnosis  Pre-op Diagnosis      * Obstructive uropathy [N13.9] Post-op Diagnosis     * Obstructive uropathy [N13.9]     Procedures  Left CYSTOSCOPY, WITH URETERAL STENT INSERTION  85322 - TN CYSTO W/INSERT URETERAL STENT    Left CYSTOSCOPY, WITH RETROGRADE PYELOGRAM  74710 - TN CYSTOURETHROSCOPY W/URETERAL CATHETERIZATION      Surgeons      * Stewart Naidu - Primary    Resident/Fellow/Other Assistant:  Surgeons and Role:     * Trupti Mariee MD PhD - Resident - Assisting     * Stewart Naidu MD - Fellow    Staff:   Circulator: Evelyne  Scrub Person: Shonna  Circulator: Trisha Hairub Person: Aaron    Anesthesia Staff: Anesthesiologist: Jonathan Hollis MD  CRNA: TOBY Patricio  Frontline Breaker: IVELISSE Asher    Procedure Summary  Anesthesia: General  ASA: II  Estimated Blood Loss:  <1 mL  Intra-op Medications: Administrations occurring from 1135 to 1225 on 25:  * No intraprocedure medications in log *        Specimen: No specimens collected              Drains and/or Catheters:   Urethral Catheter Double-lumen 18 Fr. (Active)       Ureteral Drain/Stent Left ureter (Active)   Site/Stoma Assessment Other (Comment) 25   Dressing Status Other (Comment) 25   Dressing Type Other (Comment) 25   Collection Container Other (Comment) 25   Securement Method Other (Comment) 25       Tourniquet Times:         Implants:  Implants         6x26 JJ ureteral stent LEFT    Findings:  Prior 6x26 JJ stent (left) removed without issue, replaced with new 6x26 JJ stent. Moderate-severe hydroureteronephrosis down to the bladder on RPG.    Indications: Laurence Campbell \"Paula\" is an 59 y.o. female with " vaginal carcinoma currently undergoing chemotherapy. She had a L ureteral stent placed in July of 2023 for hydronephrosis in the setting of acute renal failure. A CT scan at that time showed an abnormal soft tissue density at the L UVJ. This is currently managed with regular stent exchanges every 6 months.     The patient was seen in the preoperative area. The risks, benefits, complications, treatment options, non-operative alternatives, expected recovery and outcomes were discussed with the patient. The possibilities of reaction to medication, pulmonary aspiration, injury to surrounding structures, bleeding, recurrent infection, the need for additional procedures, failure to diagnose a condition, and creating a complication requiring transfusion or operation were discussed with the patient. The patient concurred with the proposed plan, giving informed consent.  The site of surgery was properly noted/marked if necessary per policy. The patient has been actively warmed in preoperative area. Preoperative antibiotics have been ordered and given within 1 hours of incision. Venous thrombosis prophylaxis have been ordered including bilateral sequential compression devices    Procedure Details:   The patient was brought back to the operating room and transferred to the OR table. Time out was performed, preoperative antibiotics were given, and anesthesia was induced. They were repositioned in dorsal lithotomy and prepped and draped in the usual sterile fashion.     The urethral opening was severely retracted and the vaginal opening was severely narrowed. A 21 fr rigid cystoscope was used to perform cystourethroscopy. There was a great deal of debris in the bladder. The stent in the left ureter was grasped with the flexible grasper and pulled to the meatus. A guidewire was passed up to the left kidney via the stent under fluoroscopic guidance. The stent was then removed. A 5F ureteral catheter was inserted over the wire to  the proximal ureter. The wire was removed. A retrograde pyelogram was performed.       Retrograde pyelogram interpretation:  Moderate-severe hydroureteronephrosis down to the bladder.    The guide wire was then advanced back up to the left kidney, as confirmed on fluoro. A 6 x 26 JJ stent was placed over the safety wire with proximal curl noted in kidney on fluoro and distal curl in the bladder on direct visualization. The bladder was drained, instruments removed. A 16F Chowdhury catheter was placed. This concluded the procedure.     The patient was awoken from anesthesia and extubated without complication and transferred to PACU in stable condition.      Complications:  None; patient tolerated the procedure well.    Disposition: PACU - hemodynamically stable.  Condition: stable       Additional Details:  The patient will follow up with Dr. Carbajal for next stent exchange. Chowdhury should remain in place at least until urine culture with sensitivities has resulted and/or any infection is treated. Treat as appropriate. Would ensure patient is emptying her bladder, as she has required CIC in the past.    Attending Attestation: I was present and scrubbed for the entire procedure.    Stewart Naidu  [phone]

## 2025-03-14 NOTE — ANESTHESIA PREPROCEDURE EVALUATION
"Patient: Laurence Campbell \"Paula\"    Procedure Information       Date/Time: 03/14/25 1135    Procedures:       Left CYSTOSCOPY, WITH URETERAL STENT INSERTION (Left)      Left CYSTOSCOPY, WITH RETROGRADE PYELOGRAM (Left)    Location: [facility] OR 04 / Virtual [facility] OR    Surgeons: Ez Hammond MD            Relevant Problems   Anesthesia (within normal limits)      Cardiac   (+) Benign essential HTN      Pulmonary (within normal limits)      Neuro   (+) Cervical neuritis   (+) Intervertebral disc disorder with radiculopathy of lumbar region   (+) Lumbar neuritis      GI (within normal limits)      /Renal   (+) Recurrent UTI      Liver (within normal limits)      Endocrine   (+) Primary hyperparathyroidism (Multi)      Hematology   (+) Iron deficiency anemia   (+) Severe anemia      Musculoskeletal (within normal limits)      HEENT (within normal limits)      ID   (+) HSV infection   (+) Recurrent UTI      Skin (within normal limits)      GYN (within normal limits)       Clinical information reviewed:   Tobacco  Allergies  Meds   Med Hx  Surg Hx  OB Status  Fam Hx  Soc   Hx        NPO Detail:  NPO/Void Status  Date of Last Liquid: 03/14/25  Time of Last Liquid: 0000  Date of Last Solid: 03/14/25  Time of Last Solid: 0000         Physical Exam    Airway  Mallampati: I  TM distance: >3 FB  Neck ROM: full     Cardiovascular - normal exam     Dental - normal exam     Pulmonary - normal exam     Abdominal - normal exam         Anesthesia Plan    History of general anesthesia?: yes  History of complications of general anesthesia?: no    ASA 2     general     The patient is not a current smoker.    intravenous induction   Anesthetic plan and risks discussed with patient.  Use of blood products discussed with patient who consented to blood products.    "

## 2025-03-14 NOTE — ANESTHESIA PROCEDURE NOTES
Airway  Date/Time: 3/14/2025 11:58 AM  Urgency: elective    Airway not difficult    Staffing  Performed: CRNA   Authorized by: Jonathan Hollis MD    Performed by: IVELISSE Asher  Patient location during procedure: OR    Indications and Patient Condition  Indications for airway management: anesthesia and airway protection  Spontaneous ventilation: present  Sedation level: deep  Preoxygenated: yes  Patient position: sniffing  MILS maintained throughout  Mask difficulty assessment: 1 - vent by mask    Final Airway Details  Final airway type: supraglottic airway      Successful airway: classic  Size 4     Number of attempts at approach: 1    Additional Comments  Easy LMA placement. Teeth and lips in pre-op condition. Soft bite block used.

## 2025-03-14 NOTE — CARE PLAN
The patient's goals for the shift include      The clinical goals for the shift include rest      Problem: Pain - Adult  Goal: Verbalizes/displays adequate comfort level or baseline comfort level  Flowsheets (Taken 3/13/2025 2248)  Verbalizes/displays adequate comfort level or baseline comfort level:   Encourage patient to monitor pain and request assistance   Assess pain using appropriate pain scale   Administer analgesics based on type and severity of pain and evaluate response   Implement non-pharmacological measures as appropriate and evaluate response   Consider cultural and social influences on pain and pain management   Notify Licensed Independent Practitioner if interventions unsuccessful or patient reports new pain     Problem: Safety - Adult  Goal: Free from fall injury  Flowsheets (Taken 3/13/2025 2248)  Free from fall injury:   Instruct family/caregiver on patient safety   Based on caregiver fall risk screen, instruct family/caregiver to ask for assistance with transferring infant if caregiver noted to have fall risk factors     Problem: Discharge Planning  Goal: Discharge to home or other facility with appropriate resources  Flowsheets (Taken 3/13/2025 2248)  Discharge to home or other facility with appropriate resources:   Identify barriers to discharge with patient and caregiver   Arrange for needed discharge resources and transportation as appropriate   Identify discharge learning needs (meds, wound care, etc)   Arrange for interpreters to assist at discharge as needed   Refer to discharge planning if patient needs post-hospital services based on physician order or complex needs related to functional status, cognitive ability or social support system     Problem: Chronic Conditions and Co-morbidities  Goal: Patient's chronic conditions and co-morbidity symptoms are monitored and maintained or improved  Flowsheets (Taken 3/13/2025 2248)  Care Plan - Patient's Chronic Conditions and Co-Morbidity  Symptoms are Monitored and Maintained or Improved:   Monitor and assess patient's chronic conditions and comorbid symptoms for stability, deterioration, or improvement   Collaborate with multidisciplinary team to address chronic and comorbid conditions and prevent exacerbation or deterioration   Update acute care plan with appropriate goals if chronic or comorbid symptoms are exacerbated and prevent overall improvement and discharge

## 2025-03-14 NOTE — PROGRESS NOTES
03/14/25 1001   Discharge Planning   Assistance Needed independent at baseline   Type of Residence Private residence   Who is requesting discharge planning? Provider   Home or Post Acute Services None   Expected Discharge Disposition Home     Unable to complete full dc assessment at this time, patient off floor for urology procedure.     Spoke to bedside RN who confirms patient is independent in her room.  Anticipate home no needs but will need to confirm closer to discharge.  Watch urology plan for neph tube?  And hhc need?    ADOD Monday?  BARRIERS DON, hydronephrosis, stent exchange today  DISPO home?

## 2025-03-15 LAB
ANION GAP SERPL CALC-SCNC: 18 MMOL/L (ref 10–20)
BUN SERPL-MCNC: 77 MG/DL (ref 6–23)
CALCIUM SERPL-MCNC: 7.2 MG/DL (ref 8.6–10.3)
CHLORIDE SERPL-SCNC: 107 MMOL/L (ref 98–107)
CO2 SERPL-SCNC: 15 MMOL/L (ref 21–32)
CREAT SERPL-MCNC: 2.93 MG/DL (ref 0.5–1.05)
EGFRCR SERPLBLD CKD-EPI 2021: 18 ML/MIN/1.73M*2
ERYTHROCYTE [DISTWIDTH] IN BLOOD BY AUTOMATED COUNT: 15.1 % (ref 11.5–14.5)
GLUCOSE SERPL-MCNC: 130 MG/DL (ref 74–99)
HCT VFR BLD AUTO: 37 % (ref 36–46)
HGB BLD-MCNC: 13 G/DL (ref 12–16)
MCH RBC QN AUTO: 28.8 PG (ref 26–34)
MCHC RBC AUTO-ENTMCNC: 35.1 G/DL (ref 32–36)
MCV RBC AUTO: 82 FL (ref 80–100)
NRBC BLD-RTO: 0 /100 WBCS (ref 0–0)
PLATELET # BLD AUTO: 178 X10*3/UL (ref 150–450)
POTASSIUM SERPL-SCNC: 3.8 MMOL/L (ref 3.5–5.3)
RBC # BLD AUTO: 4.51 X10*6/UL (ref 4–5.2)
SODIUM SERPL-SCNC: 136 MMOL/L (ref 136–145)
WBC # BLD AUTO: 15 X10*3/UL (ref 4.4–11.3)

## 2025-03-15 PROCEDURE — 36415 COLL VENOUS BLD VENIPUNCTURE: CPT | Performed by: INTERNAL MEDICINE

## 2025-03-15 PROCEDURE — 2500000004 HC RX 250 GENERAL PHARMACY W/ HCPCS (ALT 636 FOR OP/ED)

## 2025-03-15 PROCEDURE — 99232 SBSQ HOSP IP/OBS MODERATE 35: CPT

## 2025-03-15 PROCEDURE — 1200000002 HC GENERAL ROOM WITH TELEMETRY DAILY

## 2025-03-15 PROCEDURE — 80048 BASIC METABOLIC PNL TOTAL CA: CPT | Performed by: INTERNAL MEDICINE

## 2025-03-15 PROCEDURE — 85027 COMPLETE CBC AUTOMATED: CPT | Performed by: INTERNAL MEDICINE

## 2025-03-15 PROCEDURE — 2500000001 HC RX 250 WO HCPCS SELF ADMINISTERED DRUGS (ALT 637 FOR MEDICARE OP)

## 2025-03-15 PROCEDURE — 99232 SBSQ HOSP IP/OBS MODERATE 35: CPT | Performed by: INTERNAL MEDICINE

## 2025-03-15 RX ADMIN — HEPARIN SODIUM 5000 UNITS: 5000 INJECTION, SOLUTION INTRAVENOUS; SUBCUTANEOUS at 16:04

## 2025-03-15 RX ADMIN — CEFTRIAXONE 2 G: 2 INJECTION, POWDER, FOR SOLUTION INTRAMUSCULAR; INTRAVENOUS at 16:04

## 2025-03-15 RX ADMIN — PANTOPRAZOLE SODIUM 40 MG: 40 TABLET, DELAYED RELEASE ORAL at 05:45

## 2025-03-15 RX ADMIN — HEPARIN SODIUM 5000 UNITS: 5000 INJECTION, SOLUTION INTRAVENOUS; SUBCUTANEOUS at 05:45

## 2025-03-15 RX ADMIN — AZITHROMYCIN MONOHYDRATE 500 MG: 500 INJECTION, POWDER, LYOPHILIZED, FOR SOLUTION INTRAVENOUS at 16:04

## 2025-03-15 RX ADMIN — HEPARIN SODIUM 5000 UNITS: 5000 INJECTION, SOLUTION INTRAVENOUS; SUBCUTANEOUS at 21:04

## 2025-03-15 ASSESSMENT — PAIN SCALES - GENERAL
PAINLEVEL_OUTOF10: 0 - NO PAIN
PAINLEVEL_OUTOF10: 0 - NO PAIN

## 2025-03-15 NOTE — POST-PROCEDURE NOTE
"59 year old female now POD#0 s/p left cystoscopy with ureteral stent insertion for known obstructive uropathy, had stent placed prior to today. Intra-op findings significant for Prior 6x26 JJ stent (left) removed without issue, replaced with new 6x26 JJ stent. Moderate-severe hydroureteronephrosis down to the bladder on RPG.     Patient doing well this evening, eating dinner. States she felt some bladder spasm type pain when she initially came out of the OR but it is slowly improving. No pain with abdominal palpation.     PE:  Constitutional: A&Ox3, calm and cooperative  Eyes:  clear sclera   ENMT: Moist mucous membranes  Cardiovascular: Normal rate and regular rhythm.   Respiratory/Thorax:Good symmetric chest expansion.   Gastrointestinal: Abdomen soft, non-tender  Genitourinary: Meza catheter with terence urine the tubing, some old cloudy sediment in the drainage bag. Urine flowing freely.     /73   Pulse 77   Temp 37 °C (98.6 °F)   Resp 18   Ht 1.702 m (5' 7\")   Wt 88.5 kg (195 lb 1.7 oz)   SpO2 97%   BMI 30.56 kg/m²       Plan:   - Diet: as per medicine team  - IVF until tolerating adequate PO  - Continue current pain regimen   - Continue antibiotics to complete the course--> follow up on urine culture  - Maintain meza catheter until sensitivities complete --> bladder scan after meza catheter removed, she is able to perform CIC if needed   - PRN antiemetic   - DVT Proph: SCDs/ ambulate/ Heparin SQ   - Bowel regimen: PRN Miralax   - Monitor VS every 4 hours   - Labs ordered for AM   - IS every hour while awake   - Encourage ambulation / OOB as tolerated   - Instructed on post operative care, s/s of infection  - Continue supportive care  - follow up with Dr. Carbajal for follow up and stent exchange       Total time spent 35 minutes, and greater than 50% of  time was spent in counseling/coordination of care     "

## 2025-03-15 NOTE — CARE PLAN
The patient's goals for the shift include      The clinical goals for the shift include pt will not fall this shift      Problem: Fall/Injury  Goal: Pace activities to prevent fatigue by end of the shift  Outcome: Progressing

## 2025-03-15 NOTE — PROGRESS NOTES
"Laurence Campbell \"Jeremiah" is a 59 y.o. female on day 2 of admission presenting with Acute renal failure, unspecified acute renal failure type (CMS-HCC).    Subjective   NAEO, no complaints. Reports no pain, nausea, vomiting, fever, chills.        Objective     PE:  Constitutional: calm and cooperative, NAD  Eyes: PERRL, clear sclera   ENMT: Moist mucous membranes  Head/Neck: Neck supple, no JVD  Cardiovascular: 2+ equal pulses of the distal extremities.  Respiratory/Thorax: Good symmetric chest expansion. On RA  Gastrointestinal: Abdomen non distended, soft, non tender  Genitourinary: meza in place draining yellow urine with occasional blood and sediment in tubing. No clots noted    Musculoskeletal: ROM intact, no joint swelling, normal strength  Extremities: No peripheral edema  Neurological: A&Ox3, No focal deficits   Psychological: Appropriate mood and behavior  Skin: Warm and dry    Last Recorded Vitals  Blood pressure 123/78, pulse 62, temperature 36.6 °C (97.8 °F), temperature source Oral, resp. rate 16, height 1.702 m (5' 7\"), weight 88.5 kg (195 lb 1.7 oz), SpO2 97%.  Intake/Output last 3 Shifts:  I/O last 3 completed shifts:  In: 3109.9 (35.1 mL/kg) [P.O.:480; I.V.:1729.9 (19.5 mL/kg); IV Piggyback:900]  Out: 1550 (17.5 mL/kg) [Urine:1550 (0.5 mL/kg/hr)]  Weight: 88.5 kg     Relevant Results  .Scheduled medications  azithromycin, 500 mg, intravenous, q24h  cefTRIAXone, 2 g, intravenous, q24h  heparin (porcine), 5,000 Units, subcutaneous, q8h ARIANNA  pantoprazole, 40 mg, oral, Daily before breakfast   Or  pantoprazole, 40 mg, intravenous, Daily before breakfast      Continuous medications     PRN medications  PRN medications: acetaminophen **OR** acetaminophen **OR** acetaminophen, guaiFENesin, melatonin, ondansetron **OR** ondansetron, polyethylene glycol  Results for orders placed or performed during the hospital encounter of 03/13/25 (from the past 24 hours)   Basic metabolic panel   Result Value Ref Range "    Glucose 130 (H) 74 - 99 mg/dL    Sodium 136 136 - 145 mmol/L    Potassium 3.8 3.5 - 5.3 mmol/L    Chloride 107 98 - 107 mmol/L    Bicarbonate 15 (L) 21 - 32 mmol/L    Anion Gap 18 10 - 20 mmol/L    Urea Nitrogen 77 (H) 6 - 23 mg/dL    Creatinine 2.93 (H) 0.50 - 1.05 mg/dL    eGFR 18 (L) >60 mL/min/1.73m*2    Calcium 7.2 (L) 8.6 - 10.3 mg/dL   CBC   Result Value Ref Range    WBC 15.0 (H) 4.4 - 11.3 x10*3/uL    nRBC 0.0 0.0 - 0.0 /100 WBCs    RBC 4.51 4.00 - 5.20 x10*6/uL    Hemoglobin 13.0 12.0 - 16.0 g/dL    Hematocrit 37.0 36.0 - 46.0 %    MCV 82 80 - 100 fL    MCH 28.8 26.0 - 34.0 pg    MCHC 35.1 32.0 - 36.0 g/dL    RDW 15.1 (H) 11.5 - 14.5 %    Platelets 178 150 - 450 x10*3/uL     *Note: Due to a large number of results and/or encounters for the requested time period, some results have not been displayed. A complete set of results can be found in Results Review.     FL less than 1 hour   Final Result      CT abdomen pelvis wo IV contrast   Final Result   Status post right nephrectomy.  Left-sided hydronephrosis.  There is a   double-J ureteral stent present with the proximal J in the proximal   left ureter.   Thickening of the wall of the urinary bladder.  This can be seen with   cystitis and correlation with the patient's urinalysis is recommended.   Left lower lobe infiltrate.   Signed by Isaías Vieira MD      XR chest 1 view   Final Result   Minimal platelike atelectasis in the left lung base.  Hypoventilatory   appearance of the lungs.   If there is persistent clinical suspicion for pneumonia would   recommend short-term follow-up PA and lateral chest x-ray to   reevaluate.   Signed by Jonnathan Lundberg, DO            This patient has a urinary catheter   Reason for the urinary catheter remaining today? urinary retention/bladder outlet obstruction, acute or chronic               Assessment/Plan   Assessment & Plan  Acute renal failure, unspecified acute renal failure type (CMS-HCC)    Obstructive  "uropathy    Laurence Campbell \"Paula\" is a 59 y.o. female on day 2 of admission for ARF. Now POD 1 left cystoscopy with ureteral stent insertion for known obstructive uropathy, had stent placed prior to today. Intra-op findings showed prior 6x26 JJ stent (left) removed without issue, replaced with new 6x26 JJ stent. Moderate-severe hydroureteronephrosis down to the bladder on RPG.     A/P:   - diet as tolerated   - PRN pain regimen; can have pyridium and/or oxybutynin for bladder spasms if needed  - Continue rocephin; pending final urine cxs   - Maintain meza catheter until sensitivities complete. Can plan for TOV tomorrow  - PRN antiemetic   - trend labs in the AM  - DVT Proph: SCDs/ ambulate/ Heparin SQ     Follow up with Dr. Carbajal for follow up and future stent exchange. TOV tomorrow if urine cxs final.         I spent 35 minutes in the professional and overall care of this patient.      Federica Iqbal, APRN-CNP      "

## 2025-03-15 NOTE — PROGRESS NOTES
Laurence Campbell is a 59 y.o. female     Patient is s/p cystoscopy with retrograde pyelogram and ureteral stent placement  Urine is clearing up  Kidney functions improving  Urine culture results pending    Review of Systems     Constitutional: no fever, no chills, not feeling poorly, not feeling tired   Cardiovascular: no chest pain   Respiratory: no cough, wheezing or shortness of breath a  Gastrointestinal: no abdominal pain, no constipation, no melena, no nausea, no diarrhea, no vomiting and no blood in stools.   Neurological: no headache,   All other systems have been reviewed and are negative for complaint.       Vitals:    03/15/25 1100   BP: 123/78   Pulse: 62   Resp: 16   Temp: 36.6 °C (97.8 °F)   SpO2: 97%        Scheduled medications  azithromycin, 500 mg, intravenous, q24h  cefTRIAXone, 2 g, intravenous, q24h  heparin (porcine), 5,000 Units, subcutaneous, q8h ARIANNA  pantoprazole, 40 mg, oral, Daily before breakfast   Or  pantoprazole, 40 mg, intravenous, Daily before breakfast      Continuous medications     PRN medications  PRN medications: acetaminophen **OR** acetaminophen **OR** acetaminophen, guaiFENesin, melatonin, ondansetron **OR** ondansetron, polyethylene glycol    Lab Review   Results from last 7 days   Lab Units 03/15/25  0657 03/14/25 0527 03/13/25  1214   WBC AUTO x10*3/uL 15.0* 13.1* 15.5*   HEMOGLOBIN g/dL 13.0 12.4 12.7   HEMATOCRIT % 37.0 35.1* 34.3*   PLATELETS AUTO x10*3/uL 178 143* 138*     Results from last 7 days   Lab Units 03/15/25  0657 03/14/25  0527 03/13/25  1214   SODIUM mmol/L 136 134* 131*   POTASSIUM mmol/L 3.8 3.6 3.5   CHLORIDE mmol/L 107 103 99   CO2 mmol/L 15* 17* 20*   BUN mg/dL 77* 93* 96*   CREATININE mg/dL 2.93* 5.75* 6.06*   CALCIUM mg/dL 7.2* 7.1* 7.2*   PROTEIN TOTAL g/dL  --   --  5.7*   BILIRUBIN TOTAL mg/dL  --   --  2.3*   ALK PHOS U/L  --   --  102   ALT U/L  --   --  24   AST U/L  --   --  31   GLUCOSE mg/dL 130* 93 115*            FL less than 1 hour    Final Result      CT abdomen pelvis wo IV contrast   Final Result   Status post right nephrectomy.  Left-sided hydronephrosis.  There is a   double-J ureteral stent present with the proximal J in the proximal   left ureter.   Thickening of the wall of the urinary bladder.  This can be seen with   cystitis and correlation with the patient's urinalysis is recommended.   Left lower lobe infiltrate.   Signed by Isaías Vieira MD      XR chest 1 view   Final Result   Minimal platelike atelectasis in the left lung base.  Hypoventilatory   appearance of the lungs.   If there is persistent clinical suspicion for pneumonia would   recommend short-term follow-up PA and lateral chest x-ray to   reevaluate.   Signed by Jonnathan Lundberg DO            Physical Exam    Constitutional   General appearance: Alert and in no acute distress.   Pulmonary   Respiratory assessment: No respiratory distress, normal respiratory rhythm and effort.    Auscultation of Lungs: Clear bilateral breath sounds.   Cardiovascular   Auscultation of heart: Apical pulse normal, heart rate and rhythm normal, normal S1 and S2, no murmurs and no pericardial rub.    Exam for edema: No peripheral edema.   Abdomen   Abdominal Exam: No bruits, normal bowel sounds, soft, non-tender, no abdominal mass palpated.    Liver and Spleen exam: No hepato-splenomegaly.   Musculoskeletal     Inspection/palpation of joints, bones and muscles: No joint swelling. Normal movement of all extremities.   Neurologic   Cranial nerves: Nerves 2-12 were intact, no focal neuro defects.         Assessment/Plan      #Acute kidney injury  #Hydronephrosis  #UTI  #Hypertension  Kidney functions continue to improve  Hematuria is cleared up  Continue antibiotics    Chowdhury removal as per urology

## 2025-03-15 NOTE — CARE PLAN
The patient's goals for the shift include  enmanuel rest    The clinical goals for the shift include safety      Problem: Discharge Planning  Goal: Discharge to home or other facility with appropriate resources  Outcome: Progressing     Problem: Chronic Conditions and Co-morbidities  Goal: Patient's chronic conditions and co-morbidity symptoms are monitored and maintained or improved  Outcome: Progressing     Problem: Nutrition  Goal: Nutrient intake appropriate for maintaining nutritional needs  Outcome: Progressing     Problem: Fall/Injury  Goal: Pace activities to prevent fatigue by end of the shift  Outcome: Progressing

## 2025-03-16 VITALS
SYSTOLIC BLOOD PRESSURE: 148 MMHG | HEIGHT: 67 IN | BODY MASS INDEX: 30.62 KG/M2 | OXYGEN SATURATION: 98 % | DIASTOLIC BLOOD PRESSURE: 84 MMHG | TEMPERATURE: 98.9 F | RESPIRATION RATE: 16 BRPM | WEIGHT: 195.11 LBS | HEART RATE: 78 BPM

## 2025-03-16 LAB
ANION GAP SERPL CALC-SCNC: 14 MMOL/L (ref 10–20)
BACTERIA UR CULT: ABNORMAL
BACTERIA UR CULT: ABNORMAL
BUN SERPL-MCNC: 52 MG/DL (ref 6–23)
CALCIUM SERPL-MCNC: 6.9 MG/DL (ref 8.6–10.3)
CHLORIDE SERPL-SCNC: 107 MMOL/L (ref 98–107)
CO2 SERPL-SCNC: 20 MMOL/L (ref 21–32)
CREAT SERPL-MCNC: 1.84 MG/DL (ref 0.5–1.05)
EGFRCR SERPLBLD CKD-EPI 2021: 31 ML/MIN/1.73M*2
ERYTHROCYTE [DISTWIDTH] IN BLOOD BY AUTOMATED COUNT: 14.7 % (ref 11.5–14.5)
GLUCOSE SERPL-MCNC: 90 MG/DL (ref 74–99)
HCT VFR BLD AUTO: 33.2 % (ref 36–46)
HGB BLD-MCNC: 11.7 G/DL (ref 12–16)
MCH RBC QN AUTO: 28.2 PG (ref 26–34)
MCHC RBC AUTO-ENTMCNC: 35.2 G/DL (ref 32–36)
MCV RBC AUTO: 80 FL (ref 80–100)
NRBC BLD-RTO: 0 /100 WBCS (ref 0–0)
PLATELET # BLD AUTO: 238 X10*3/UL (ref 150–450)
POTASSIUM SERPL-SCNC: 3.3 MMOL/L (ref 3.5–5.3)
RBC # BLD AUTO: 4.15 X10*6/UL (ref 4–5.2)
SODIUM SERPL-SCNC: 138 MMOL/L (ref 136–145)
WBC # BLD AUTO: 18.6 X10*3/UL (ref 4.4–11.3)

## 2025-03-16 PROCEDURE — 36415 COLL VENOUS BLD VENIPUNCTURE: CPT | Performed by: INTERNAL MEDICINE

## 2025-03-16 PROCEDURE — 2500000001 HC RX 250 WO HCPCS SELF ADMINISTERED DRUGS (ALT 637 FOR MEDICARE OP)

## 2025-03-16 PROCEDURE — 80048 BASIC METABOLIC PNL TOTAL CA: CPT | Performed by: INTERNAL MEDICINE

## 2025-03-16 PROCEDURE — 2500000004 HC RX 250 GENERAL PHARMACY W/ HCPCS (ALT 636 FOR OP/ED)

## 2025-03-16 PROCEDURE — 99232 SBSQ HOSP IP/OBS MODERATE 35: CPT

## 2025-03-16 PROCEDURE — 99239 HOSP IP/OBS DSCHRG MGMT >30: CPT | Performed by: INTERNAL MEDICINE

## 2025-03-16 PROCEDURE — 85027 COMPLETE CBC AUTOMATED: CPT | Performed by: INTERNAL MEDICINE

## 2025-03-16 PROCEDURE — 2500000001 HC RX 250 WO HCPCS SELF ADMINISTERED DRUGS (ALT 637 FOR MEDICARE OP): Performed by: INTERNAL MEDICINE

## 2025-03-16 RX ORDER — CIPROFLOXACIN 250 MG/1
250 TABLET, FILM COATED ORAL EVERY 12 HOURS SCHEDULED
Qty: 14 TABLET | Refills: 0 | Status: SHIPPED | OUTPATIENT
Start: 2025-03-16 | End: 2025-03-25

## 2025-03-16 RX ORDER — CIPROFLOXACIN 500 MG/1
250 TABLET ORAL EVERY 12 HOURS SCHEDULED
Status: DISCONTINUED | OUTPATIENT
Start: 2025-03-16 | End: 2025-03-16 | Stop reason: HOSPADM

## 2025-03-16 RX ADMIN — HEPARIN SODIUM 5000 UNITS: 5000 INJECTION, SOLUTION INTRAVENOUS; SUBCUTANEOUS at 07:00

## 2025-03-16 RX ADMIN — CIPROFLOXACIN HYDROCHLORIDE 250 MG: 500 TABLET, FILM COATED ORAL at 15:08

## 2025-03-16 RX ADMIN — HEPARIN SODIUM 5000 UNITS: 5000 INJECTION, SOLUTION INTRAVENOUS; SUBCUTANEOUS at 15:08

## 2025-03-16 RX ADMIN — PANTOPRAZOLE SODIUM 40 MG: 40 TABLET, DELAYED RELEASE ORAL at 07:02

## 2025-03-16 ASSESSMENT — COGNITIVE AND FUNCTIONAL STATUS - GENERAL
DAILY ACTIVITIY SCORE: 24
MOBILITY SCORE: 24

## 2025-03-16 ASSESSMENT — PAIN SCALES - GENERAL: PAINLEVEL_OUTOF10: 0 - NO PAIN

## 2025-03-16 NOTE — NURSING NOTE
Discharge instructions given , verbalized understanding, discontinue hep lock  
Detail Level: Detailed
Quality 130: Documentation Of Current Medications In The Medical Record: Current Medications Documented

## 2025-03-16 NOTE — CARE PLAN
The patient's goals for the shift include      The clinical goals for the shift include remain free from falls and injury      Problem: Discharge Planning  Goal: Discharge to home or other facility with appropriate resources  Outcome: Progressing     Problem: Chronic Conditions and Co-morbidities  Goal: Patient's chronic conditions and co-morbidity symptoms are monitored and maintained or improved  Outcome: Progressing     Problem: Nutrition  Goal: Nutrient intake appropriate for maintaining nutritional needs  Outcome: Progressing

## 2025-03-16 NOTE — PROGRESS NOTES
"Laurence Campbell \"Jeremiah" is a 59 y.o. female on day 3 of admission presenting with Acute renal failure, unspecified acute renal failure type (CMS-HCC).    Subjective   NAEO, no complaints.        Objective     PE:  Constitutional: calm and cooperative, NAD  Eyes: PERRL, clear sclera   ENMT: Moist mucous membranes  Head/Neck: Neck supple, no JVD  Cardiovascular: 2+ equal pulses of the distal extremities.  Respiratory/Thorax: Good symmetric chest expansion. On RA  Gastrointestinal: Abdomen non distended, soft, non tender  Genitourinary: meza in place draining yellow urine with occasional blood and sediment in tubing. No clots noted    Musculoskeletal: ROM intact, no joint swelling, normal strength  Extremities: No peripheral edema  Neurological: A&Ox3, No focal deficits   Psychological: Appropriate mood and behavior  Skin: Warm and dry    Last Recorded Vitals  Blood pressure 132/77, pulse 70, temperature 37.1 °C (98.8 °F), temperature source Temporal, resp. rate 18, height 1.702 m (5' 7\"), weight 88.5 kg (195 lb 1.7 oz), SpO2 98%.  Intake/Output last 3 Shifts:  I/O last 3 completed shifts:  In: 840 (9.5 mL/kg) [P.O.:840]  Out: 3450 (39 mL/kg) [Urine:3450 (1.1 mL/kg/hr)]  Weight: 88.5 kg     Relevant Results  .Scheduled medications  ciprofloxacin, 250 mg, oral, q12h ARIANNA  heparin (porcine), 5,000 Units, subcutaneous, q8h ARIANNA  pantoprazole, 40 mg, oral, Daily before breakfast   Or  pantoprazole, 40 mg, intravenous, Daily before breakfast      Continuous medications     PRN medications  PRN medications: acetaminophen **OR** acetaminophen **OR** acetaminophen, guaiFENesin, melatonin, ondansetron **OR** ondansetron, polyethylene glycol  Results for orders placed or performed during the hospital encounter of 03/13/25 (from the past 24 hours)   Basic metabolic panel   Result Value Ref Range    Glucose 90 74 - 99 mg/dL    Sodium 138 136 - 145 mmol/L    Potassium 3.3 (L) 3.5 - 5.3 mmol/L    Chloride 107 98 - 107 mmol/L    " "Bicarbonate 20 (L) 21 - 32 mmol/L    Anion Gap 14 10 - 20 mmol/L    Urea Nitrogen 52 (H) 6 - 23 mg/dL    Creatinine 1.84 (H) 0.50 - 1.05 mg/dL    eGFR 31 (L) >60 mL/min/1.73m*2    Calcium 6.9 (L) 8.6 - 10.3 mg/dL   CBC   Result Value Ref Range    WBC 18.6 (H) 4.4 - 11.3 x10*3/uL    nRBC 0.0 0.0 - 0.0 /100 WBCs    RBC 4.15 4.00 - 5.20 x10*6/uL    Hemoglobin 11.7 (L) 12.0 - 16.0 g/dL    Hematocrit 33.2 (L) 36.0 - 46.0 %    MCV 80 80 - 100 fL    MCH 28.2 26.0 - 34.0 pg    MCHC 35.2 32.0 - 36.0 g/dL    RDW 14.7 (H) 11.5 - 14.5 %    Platelets 238 150 - 450 x10*3/uL     *Note: Due to a large number of results and/or encounters for the requested time period, some results have not been displayed. A complete set of results can be found in Results Review.     FL less than 1 hour   Final Result      CT abdomen pelvis wo IV contrast   Final Result   Status post right nephrectomy.  Left-sided hydronephrosis.  There is a   double-J ureteral stent present with the proximal J in the proximal   left ureter.   Thickening of the wall of the urinary bladder.  This can be seen with   cystitis and correlation with the patient's urinalysis is recommended.   Left lower lobe infiltrate.   Signed by Isaías Vieira MD      XR chest 1 view   Final Result   Minimal platelike atelectasis in the left lung base.  Hypoventilatory   appearance of the lungs.   If there is persistent clinical suspicion for pneumonia would   recommend short-term follow-up PA and lateral chest x-ray to   reevaluate.   Signed by Jonnathan Lundberg, DO            This patient has a urinary catheter   Reason for the urinary catheter remaining today? urinary retention/bladder outlet obstruction, acute or chronic               Assessment/Plan   Assessment & Plan  Acute renal failure, unspecified acute renal failure type (CMS-HCC)    Obstructive uropathy    Laurence Y Shannan \"Paula\" is a 59 y.o. female on day 3 of admission for ARF. Now POD 2 left cystoscopy with ureteral stent " insertion for known obstructive uropathy, had stent placed prior to today. Intra-op findings showed prior 6x26 JJ stent (left) removed without issue, replaced with new 6x26 JJ stent. Moderate-severe hydroureteronephrosis down to the bladder on RPG.     A/P:   - diet as tolerated   - PRN pain regimen; can have pyridium and/or oxybutynin for bladder spasms if needed  - Pending final urine cxs. Susceptibility report back, recommend switching abx to cefazolin or ciprofloxacin.   - PRN antiemetic   - creatinine improving 2.9--> 1.8  - uptrending leukocytosis- switch antibiotics today for UTI. Trend CBC while inpatient  - remove catheter today, TOV. Bladder scan for PVR   - DVT Proph: SCDs/ ambulate/ Heparin SQ     Urology will sign off. Fu with PVRs to assure patient is not retaining after catheter removal. Follow up with Dr. Carbajal outpatient.         I spent 35 minutes in the professional and overall care of this patient.      Federica Iqbal, APRN-CNP

## 2025-03-16 NOTE — CARE PLAN
Problem: Discharge Planning  Goal: Discharge to home or other facility with appropriate resources  Outcome: Progressing     Problem: Chronic Conditions and Co-morbidities  Goal: Patient's chronic conditions and co-morbidity symptoms are monitored and maintained or improved  Outcome: Progressing     Problem: Nutrition  Goal: Nutrient intake appropriate for maintaining nutritional needs  Outcome: Progressing     Problem: Fall/Injury  Goal: Pace activities to prevent fatigue by end of the shift  Outcome: Progressing   The patient's goals for the shift include  return to baseline mentation    The clinical goals for the shift include pt will not fall this shift    Over the shift, the patient did not make progress toward the following goals. Barriers to progression include confusion and possible delirium. Recommendations to address these barriers include sleep hygiene,medication compliance.

## 2025-03-16 NOTE — DISCHARGE SUMMARY
Discharge Diagnosis  Acute renal failure, unspecified acute renal failure type (CMS-HCC)    Issues Requiring Follow-Up  Follow-up with urology    Test Results Pending At Discharge  Pending Labs       Order Current Status    Urine Culture Preliminary result            Hospital Course  Patient with a past medical history of malignant vaginal neoplasm in remission hypertension iron deficiency anemia history of hyperparathyroidism history of solitary kidney status post nephrectomy presented to the hospital with abdominal discomfort nausea and vomiting which started 3 days ago  The patient has had a history of neurogenic bladder and had some old catheters lying on postop cath she catheter self was not able to use urine  Workup in the emergency room showed acute kidney injury with a creatinine of 6 along with imaging showing hydronephrosis  Urology has evaluated the patient and the plan is to take the patient taken the OR and stent placed  Kidney functions have improved steadily  Urine cultures came back and we switch the patient to oral ciprofloxacin    Chowdhury catheter was removed and patient was able to void successfully  We will discharge patient home and she will follow-up with urology in 2 weeks for stent removal  She will complete 7 days of antibiotics    Discharged in stable condition  Pertinent Physical Exam At Time of Discharge  Physical Exam    Constitutional   General appearance: Alert and in no acute distress.     Pulmonary   Respiratory assessment: No respiratory distress, normal respiratory rhythm and effort.    Auscultation of Lungs: Clear bilateral breath sounds.   Cardiovascular   Auscultation of heart: Apical pulse normal, heart rate and rhythm normal, normal S1 and S2, no murmurs and no pericardial rub.    Exam for edema: No peripheral edema.   Abdomen   Abdominal Exam: No bruits, normal bowel sounds, soft, non-tender, no abdominal mass palpated.    Liver and Spleen exam: No hepato-splenomegaly.    Musculoskeletal   Examination of gait: Normal.    Inspection of digits and nails: No clubbing or cyanosis of the fingernails.    Inspection/palpation of joints, bones and muscles: No joint swelling. Normal movement of all extremities.   Skin   Skin inspection: Normal skin color and pigmentation, normal skin turgor and no visible rash.   Neurologic   Cranial nerves: Nerves 2-12 were intact, no focal neuro defects.    Home Medications     Medication List      START taking these medications     ciprofloxacin 250 mg tablet; Commonly known as: Cipro; Take 1 tablet   (250 mg) by mouth every 12 hours for 7 days.     CONTINUE taking these medications     acyclovir 400 mg tablet; Commonly known as: Zovirax; TAKE 1 TABLET (400   MG) BY MOUTH TWICE A DAY   Caltrate with Vitamin D3 600 mg-20 mcg (800 unit) tablet; Generic drug:   calcium carbonate-vitamin D3   cholecalciferol 50 mcg (2,000 unit) capsule; Commonly known as: Vitamin   D-3   docusate sodium 100 mg capsule; Commonly known as: Colace   polyethylene glycol 17 gram/dose powder; Commonly known as: Glycolax,   Miralax   Prolia 60 mg/mL syringe; Generic drug: denosumab; Physician's office to   inject 60mg (1 prefilled syringe) beneath the skin once every 6 months.   For office use only.     STOP taking these medications     cyanocobalamin (vitamin B-12) 1,000 mcg tablet, sublingual   hydroCHLOROthiazide 25 mg tablet; Commonly known as: HYDRODiuril       Outpatient Follow-Up  Future Appointments   Date Time Provider Department Center   3/18/2025 10:40 AM Kami Raygoza APRN-CNP [id number] [facility]   3/31/2025 10:00 AM Luis A Painter MD [id number] [facility]   12/18/2025  9:40 AM Rachel Virgen APRN-CNP [id number] [facility]   2/5/2026 12:30 PM Kamar Treadwell MD [id number] [facility]     Patient seen at bedside. Events from the last visit reviewed. Discussed with staff. Results of tests and investigations from last visit reviewed and discussed with patient/Family.  Electronic chart on [facility] reviewed. Input / Recommendations  from consultants  appreciated and reviewed and agreed with.     discharge summary and profile completed. medications reviewed and discussed with patient and family.  scripts completed and signed.     total discharge time in excess of 30 minutes.    Mansi Ellis MD

## 2025-03-17 ENCOUNTER — PATIENT OUTREACH (OUTPATIENT)
Dept: PRIMARY CARE | Facility: CLINIC | Age: 60
End: 2025-03-17
Payer: MEDICARE

## 2025-03-17 LAB
BACTERIA UR CULT: ABNORMAL
BACTERIA UR CULT: ABNORMAL

## 2025-03-17 PROCEDURE — RXMED WILLOW AMBULATORY MEDICATION CHARGE

## 2025-03-17 NOTE — PROGRESS NOTES
Discharge Facility: [facility]   Discharge Diagnosis: Acute renal failure, unspecified acute renal failure type   Admission Date: 3/13/2025  Procedure Date: 3/14/2025 Left cystoscopy with ureteral stent insertion  Discharge Date: 3/16/2025    PCP Appointment Date: Appointment with MARY Shanks (03/18/2025)   Specialist Appointment Date: Urology TBD  Appointment with Luis A Painter MD (03/31/2025) Hem/Oncology,  Appointment with MARY Lopez (12/18/2025)  GYN/Oncology  Hospital Encounter and Summary Linked: Yes  ED to Hosp-Admission (Discharged) with Mansi Ellis MD; Justen Gupta MD (03/13/2025)     **Post discharge assessment deferred. Patient has a follow up appt within 2 business days of hospital discharge.

## 2025-03-18 ENCOUNTER — PHARMACY VISIT (OUTPATIENT)
Dept: PHARMACY | Facility: CLINIC | Age: 60
End: 2025-03-18
Payer: MEDICARE

## 2025-03-18 ENCOUNTER — HOSPITAL ENCOUNTER (OUTPATIENT)
Dept: RADIOLOGY | Facility: CLINIC | Age: 60
Discharge: HOME | End: 2025-03-18
Payer: MEDICARE

## 2025-03-18 ENCOUNTER — APPOINTMENT (OUTPATIENT)
Dept: PRIMARY CARE | Facility: CLINIC | Age: 60
End: 2025-03-18
Payer: MEDICARE

## 2025-03-18 VITALS
BODY MASS INDEX: 29.51 KG/M2 | HEART RATE: 88 BPM | SYSTOLIC BLOOD PRESSURE: 113 MMHG | WEIGHT: 188 LBS | HEIGHT: 67 IN | OXYGEN SATURATION: 98 % | DIASTOLIC BLOOD PRESSURE: 78 MMHG

## 2025-03-18 DIAGNOSIS — I10 BENIGN ESSENTIAL HTN: ICD-10-CM

## 2025-03-18 DIAGNOSIS — N39.0 URINARY TRACT INFECTION ASSOCIATED WITH CATHETERIZATION OF URINARY TRACT, UNSPECIFIED INDWELLING URINARY CATHETER TYPE, SEQUELA: ICD-10-CM

## 2025-03-18 DIAGNOSIS — N17.9 ACUTE RENAL FAILURE, UNSPECIFIED ACUTE RENAL FAILURE TYPE (CMS-HCC): Primary | ICD-10-CM

## 2025-03-18 DIAGNOSIS — I89.0 LYMPHEDEMA OF LEFT LEG: ICD-10-CM

## 2025-03-18 DIAGNOSIS — E78.5 ELEVATED LIPIDS: ICD-10-CM

## 2025-03-18 DIAGNOSIS — J98.11 ATELECTASIS: ICD-10-CM

## 2025-03-18 DIAGNOSIS — G62.9 NEUROPATHY: ICD-10-CM

## 2025-03-18 DIAGNOSIS — T83.511S URINARY TRACT INFECTION ASSOCIATED WITH CATHETERIZATION OF URINARY TRACT, UNSPECIFIED INDWELLING URINARY CATHETER TYPE, SEQUELA: ICD-10-CM

## 2025-03-18 PROBLEM — A41.9 SEPSIS (MULTI): Status: RESOLVED | Noted: 2025-03-18 | Resolved: 2025-03-18

## 2025-03-18 PROBLEM — R31.0 FRANK HEMATURIA: Status: RESOLVED | Noted: 2025-03-18 | Resolved: 2025-03-18

## 2025-03-18 PROBLEM — R60.0 EDEMA OF LOWER EXTREMITY: Status: RESOLVED | Noted: 2025-03-18 | Resolved: 2025-03-18

## 2025-03-18 PROBLEM — M25.619 STIFFNESS OF SHOULDER JOINT: Status: RESOLVED | Noted: 2025-03-18 | Resolved: 2025-03-18

## 2025-03-18 PROBLEM — T83.511A URINARY TRACT INFECTION ASSOCIATED WITH CATHETERIZATION OF URINARY TRACT: Status: ACTIVE | Noted: 2023-07-18

## 2025-03-18 PROBLEM — Z90.5 HISTORY OF NEPHRECTOMY: Status: ACTIVE | Noted: 2025-03-18

## 2025-03-18 PROBLEM — Z90.89 HISTORY OF PARATHYROIDECTOMY: Status: ACTIVE | Noted: 2025-03-18

## 2025-03-18 PROBLEM — R55 PRE-SYNCOPE: Status: RESOLVED | Noted: 2025-03-18 | Resolved: 2025-03-18

## 2025-03-18 PROBLEM — N13.30 HYDRONEPHROSIS: Status: ACTIVE | Noted: 2025-03-18

## 2025-03-18 PROBLEM — Z98.890 HISTORY OF PARATHYROIDECTOMY: Status: ACTIVE | Noted: 2025-03-18

## 2025-03-18 PROBLEM — E66.9 OBESITY WITH BODY MASS INDEX 30 OR GREATER: Status: RESOLVED | Noted: 2025-03-18 | Resolved: 2025-03-18

## 2025-03-18 PROBLEM — R21 RASH: Status: RESOLVED | Noted: 2025-03-18 | Resolved: 2025-03-18

## 2025-03-18 PROBLEM — M25.511 PAIN OF RIGHT SHOULDER REGION: Status: RESOLVED | Noted: 2025-03-18 | Resolved: 2025-03-18

## 2025-03-18 PROBLEM — N95.2 VAGINAL ATROPHY: Status: ACTIVE | Noted: 2025-03-18

## 2025-03-18 PROBLEM — M25.539 PAIN IN WRIST: Status: RESOLVED | Noted: 2025-03-18 | Resolved: 2025-03-18

## 2025-03-18 PROBLEM — R33.9 URINARY RETENTION: Status: ACTIVE | Noted: 2025-03-18

## 2025-03-18 PROBLEM — D50.0 ANEMIA DUE TO BLOOD LOSS: Status: ACTIVE | Noted: 2025-03-18

## 2025-03-18 PROBLEM — R00.2 PALPITATIONS: Status: RESOLVED | Noted: 2025-03-18 | Resolved: 2025-03-18

## 2025-03-18 PROBLEM — R10.32 LEFT LOWER QUADRANT ABDOMINAL PAIN: Status: RESOLVED | Noted: 2025-03-18 | Resolved: 2025-03-18

## 2025-03-18 PROBLEM — A41.9 SEPSIS WITHOUT ACUTE ORGAN DYSFUNCTION (MULTI): Status: RESOLVED | Noted: 2023-08-22 | Resolved: 2025-03-18

## 2025-03-18 PROCEDURE — 71046 X-RAY EXAM CHEST 2 VIEWS: CPT | Performed by: RADIOLOGY

## 2025-03-18 PROCEDURE — 71046 X-RAY EXAM CHEST 2 VIEWS: CPT

## 2025-03-18 PROCEDURE — 3078F DIAST BP <80 MM HG: CPT

## 2025-03-18 PROCEDURE — 3074F SYST BP LT 130 MM HG: CPT

## 2025-03-18 PROCEDURE — 3008F BODY MASS INDEX DOCD: CPT

## 2025-03-18 PROCEDURE — 1036F TOBACCO NON-USER: CPT

## 2025-03-18 PROCEDURE — 99495 TRANSJ CARE MGMT MOD F2F 14D: CPT

## 2025-03-18 RX ORDER — INFLUENZA VIRUS VACCINE 15; 15; 15 UG/.5ML; UG/.5ML; UG/.5ML
SUSPENSION INTRAMUSCULAR
COMMUNITY
Start: 2024-09-27

## 2025-03-18 RX ORDER — COVID-19 VACCINE, MRNA 50 UG/.5ML
INJECTION, SUSPENSION INTRAMUSCULAR
COMMUNITY
Start: 2024-07-21

## 2025-03-18 ASSESSMENT — ENCOUNTER SYMPTOMS
OCCASIONAL FEELINGS OF UNSTEADINESS: 0
DEPRESSION: 0
LOSS OF SENSATION IN FEET: 0

## 2025-03-18 NOTE — PROGRESS NOTES
"Patient: Laurence Campbell \"Paula\"  : 1965  PCP: DAYRON Shanks-CNP  MRN: [id number]  Program: Osteoporosis Non-Core  Status: Enrolled  Effective Dates: 2020 - present  Responsible Staff: No information to display  Social Drivers to be Addressed: No information to display    Transitional Care Management  Status: Identified  Start Date: 3/17/2025  Responsible Staff: Pricilla Brito RN  Social Drivers to be Addressed: Physical Activity, Social Connections         Laurence Campbell \"Jeremiah" is a 59 y.o. female presenting today for follow-up after being discharged from the hospital 2 days ago. The main problem requiring admission was Acute renal failure and Hydronephrosis/ dislodged stent . The discharge summary and/or Transitional Care Management documentation was reviewed. Medication reconciliation was performed as indicated via the \"Jae as Reviewed\" timestamp.     Laurence Campbell \"Jeremiah" was contacted by Transitional Care Management services two days after her discharge. This encounter and supporting documentation was reviewed.      PMH of HTN, chronic CYNTHIA, and Stage 3 vaginal cancer s/p chemoradiation (finished in ) with complications of radiation cystitis, radiation proctitis, ureter obstruction requiring stents, and right nephrectomy- she developed a neurogenic bladder, hx of intermittent self cath & recurrent UTIs- urinating on her own now. Left lower leg lymphedema-has been stable.       Hospital Follow up  Admitted: 2025  Discharged: 2025  Dx: Acute renal failure and Hydronephrosis/ dislodged stent    She went to ED with symptoms of: abdominal pain, vomiting, diarrhea and cough.  Creatinine was 6.06 3/13/25 and recheck 3/16/25 was 1.84; Acute renal failure.  Hydronephrosis/ dislodged stent ; s/p cystoscopy with retrograde pyelogram and ureteral stent placement for known obstructive uropathy on 3/14/2025; Chowdhury cath removed before discharged; Moderate-severe " "hydroureteronephrosis down to the bladder ; UC came back and put on ciprofloxacin  She sees Dr. Carbajal every 6 months for a left ureteral stent exchange. She also has a history of a right nephrectomy around 2017. Urology appointment needs to be scheduled.   She never started the ciprofloxacin- she will  and start today.  CXR: IMPRESSION:  Minimal platelike atelectasis in the left lung base.  Hypoventilatory appearance of the lungs. If there is persistent clinical suspicion for pneumonia would recommend short-term follow-up PA and lateral chest x-ray to reevaluate.  No cough, no congestion, feeling tired. No fevers, no chills. Repeat CXR ordered to follow up from hospital stay though for the atelectasis.  Needs disability placard renewed.    history of malignant vaginal neoplasm - chemoradiation in 2004- completed tx     HTN  Off hydrochlorothiazide due to renal function; BP at goal today off meds     HLD  No meds, diet controlled     LLE lymphedema has been stable, doing well     HM:  Mammogram last 9/26/24- repeat due 9/26/25  Colonoscopy due 8/2027  Healthcare POA- sister Katiana Novoa; alternate would be her mom Lily Campbell      Review of Systems  The remainder of the ROS was negative unless otherwise stated in the HPI.       /78   Pulse 88   Ht 1.702 m (5' 7\")   Wt 85.3 kg (188 lb)   SpO2 98%   BMI 29.44 kg/m²     Physical Exam  Vitals reviewed.   Constitutional:       General: She is not in acute distress.     Appearance: Normal appearance. She is normal weight. She is not ill-appearing, toxic-appearing or diaphoretic.   HENT:      Head: Normocephalic and atraumatic.      Nose: Nose normal.   Eyes:      Conjunctiva/sclera: Conjunctivae normal.   Cardiovascular:      Rate and Rhythm: Normal rate and regular rhythm.      Pulses: Normal pulses.      Heart sounds: Normal heart sounds. No murmur heard.     No friction rub. No gallop.   Pulmonary:      Effort: Pulmonary effort is normal. No " "respiratory distress.      Breath sounds: Normal breath sounds.   Musculoskeletal:      Left lower leg: Left lower leg edema: Stable LLE lymphedema.   Skin:     General: Skin is warm and dry.   Neurological:      General: No focal deficit present.      Mental Status: She is alert and oriented to person, place, and time. Mental status is at baseline.   Psychiatric:         Mood and Affect: Mood normal.         Behavior: Behavior normal.         Thought Content: Thought content normal.         Judgment: Judgment normal.         The complexity of medical decision making for this patient's transitional care is moderate.    Assessment/Plan   Problem List Items Addressed This Visit             ICD-10-CM    Urinary tract infection associated with catheterization of urinary tract (CMS-HCC)  Stable  UC came back and put on ciprofloxacin. She never started the ciprofloxacin- she will  and start today.  T83.511A, N39.0    Benign essential HTN  Stable; Off hydrochlorothiazide due to renal function; BP at goal today off meds I10    Elevated lipids  Stable; diet controlled, no meds  Lab Results   Component Value Date    CHOL 203 (H) 09/25/2024    CHOL 171 12/19/2023    CHOL 210 (H) 12/06/2022     Lab Results   Component Value Date    HDL 54.6 09/25/2024    HDL 40.0 12/19/2023    HDL 49.8 12/06/2022     Lab Results   Component Value Date    LDLCALC 132 (H) 09/25/2024    LDLCALC 116 (H) 12/19/2023     Lab Results   Component Value Date    TRIG 84 09/25/2024    TRIG 74 12/19/2023    TRIG 76 12/06/2022     No components found for: \"CHOLHDL\"   E78.5    Lymphedema of left leg I89.0    stable  Relevant Orders    Disability Placard    Neuropathy G62.9    stable  Relevant Orders    Disability Placard    Acute renal failure (CMS-HCC) - Primary N17.9    Stable  No BP meds now, not needed  She went to ED with symptoms of: abdominal pain, vomiting, diarrhea and cough.  Creatinine was 6.06 3/13/25 and recheck 3/16/25 was 1.84; Acute " renal failure.  Hydronephrosis/ dislodged stent ; s/p cystoscopy with retrograde pyelogram and ureteral stent placement for known obstructive uropathy on 3/14/2025; Chowdhury cath removed before discharged; Moderate-severe hydroureteronephrosis down to the bladder ; UC came back and put on ciprofloxacin. She never started the ciprofloxacin- she will  and start today. She sees Dr. Carbajal every 6 months for a left ureteral stent exchange. She also has a history of a right nephrectomy around 2017. Urology appointment needs to be scheduled- patient is already aware of this.   Relevant Orders    CBC and Auto Differential    Basic metabolic panel    Atelectasis J98.11    Stable  CXR: IMPRESSION: Minimal platelike atelectasis in the left lung base.  Hypoventilatory appearance of the lungs. If there is persistent clinical suspicion for pneumonia would recommend short-term follow-up PA and lateral chest x-ray to reevaluate. No cough, no congestion, feeling tired. No fevers, no chills. Repeat CXR ordered to follow up from hospital stay though for the atelectasis.  Relevant Orders    XR chest 2 views       PMH of HTN, chronic CYNTHIA, and Stage 3 vaginal cancer s/p chemoradiation (finished in 2004) with complications of radiation cystitis, radiation proctitis, ureter obstruction requiring stents, and right nephrectomy- she developed a neurogenic bladder, hx of intermittent self cath & recurrent UTIs- urinating on her own now. Left lower leg lymphedema-has been stable.       Hospital Follow up  Admitted: 03/13/2025  Discharged: 03/16/2025  Dx: Acute renal failure and Hydronephrosis/ dislodged stent    She went to ED with symptoms of: abdominal pain, vomiting, diarrhea and cough.  Creatinine was 6.06 3/13/25 and recheck 3/16/25 was 1.84; Acute renal failure.  Hydronephrosis/ dislodged stent ; s/p cystoscopy with retrograde pyelogram and ureteral stent placement for known obstructive uropathy on 3/14/2025; Chowdhury cath removed  before discharged; Moderate-severe hydroureteronephrosis down to the bladder ; UC came back and put on ciprofloxacin. She never started the ciprofloxacin- she will  and start today. She sees Dr. Carbajal every 6 months for a left ureteral stent exchange. She also has a history of a right nephrectomy around 2017. Urology appointment needs to be scheduled- patient is already aware of this.     CXR: IMPRESSION: Minimal platelike atelectasis in the left lung base.  Hypoventilatory appearance of the lungs. If there is persistent clinical suspicion for pneumonia would recommend short-term follow-up PA and lateral chest x-ray to reevaluate. No cough, no congestion, feeling tired. No fevers, no chills. Repeat CXR ordered to follow up from hospital stay though for the atelectasis.    Needs disability placard renewed- signed order today.    HTN: Off hydrochlorothiazide due to renal function; BP at goal today off meds      Follow up in 6 months or sooner if needed

## 2025-03-19 DIAGNOSIS — N89.5: ICD-10-CM

## 2025-03-19 LAB
ANION GAP SERPL CALCULATED.4IONS-SCNC: 13 MMOL/L (CALC) (ref 7–17)
BASOPHILS # BLD AUTO: 0 CELLS/UL (ref 0–200)
BASOPHILS NFR BLD AUTO: 0 %
BUN SERPL-MCNC: 21 MG/DL (ref 7–25)
BUN/CREAT SERPL: 18 (CALC) (ref 6–22)
CALCIUM SERPL-MCNC: 7.9 MG/DL (ref 8.6–10.4)
CHLORIDE SERPL-SCNC: 109 MMOL/L (ref 98–110)
CO2 SERPL-SCNC: 20 MMOL/L (ref 20–32)
CREAT SERPL-MCNC: 1.18 MG/DL (ref 0.5–1.03)
EGFRCR SERPLBLD CKD-EPI 2021: 54 ML/MIN/1.73M2
EOSINOPHIL # BLD AUTO: 0 CELLS/UL (ref 15–500)
EOSINOPHIL NFR BLD AUTO: 0 %
ERYTHROCYTE [DISTWIDTH] IN BLOOD BY AUTOMATED COUNT: 13.6 % (ref 11–15)
GLUCOSE SERPL-MCNC: 105 MG/DL (ref 65–139)
HCT VFR BLD AUTO: 41.8 % (ref 35–45)
HGB BLD-MCNC: 13.8 G/DL (ref 11.7–15.5)
LYMPHOCYTES # BLD AUTO: 2472 CELLS/UL (ref 850–3900)
LYMPHOCYTES NFR BLD AUTO: 12 %
MCH RBC QN AUTO: 29.2 PG (ref 27–33)
MCHC RBC AUTO-ENTMCNC: 33 G/DL (ref 32–36)
MCV RBC AUTO: 88.4 FL (ref 80–100)
METAMYELOCYTES # BLD: 206 CELLS/UL
METAMYELOCYTES NFR BLD MANUAL: 1 %
MONOCYTES # BLD AUTO: 412 CELLS/UL (ref 200–950)
MONOCYTES NFR BLD AUTO: 2 %
MYELOCYTES # BLD: 618 CELLS/UL
MYELOCYTES NFR BLD MANUAL: 3 %
NEUTROPHILS # BLD AUTO: ABNORMAL CELLS/UL (ref 1500–7800)
NEUTROPHILS NFR BLD AUTO: 78 %
NEUTS BAND # BLD: 824 CELLS/UL (ref 0–750)
NEUTS BAND NFR BLD MANUAL: 4 %
PLATELET # BLD AUTO: 203 THOUSAND/UL (ref 140–400)
PMV BLD REES-ECKER: 12.6 FL (ref 7.5–12.5)
POTASSIUM SERPL-SCNC: 3.7 MMOL/L (ref 3.5–5.3)
RBC # BLD AUTO: 4.73 MILLION/UL (ref 3.8–5.1)
SERVICE CMNT-IMP: ABNORMAL
SODIUM SERPL-SCNC: 142 MMOL/L (ref 135–146)
WBC # BLD AUTO: 20.6 THOUSAND/UL (ref 3.8–10.8)

## 2025-03-21 RX ORDER — ACYCLOVIR 400 MG/1
400 TABLET ORAL 2 TIMES DAILY
Qty: 180 TABLET | Refills: 0 | Status: SHIPPED | OUTPATIENT
Start: 2025-03-21

## 2025-03-24 ENCOUNTER — LAB (OUTPATIENT)
Dept: LAB | Facility: HOSPITAL | Age: 60
End: 2025-03-24
Payer: MEDICARE

## 2025-03-24 DIAGNOSIS — D64.9 ANEMIA, UNSPECIFIED: Primary | ICD-10-CM

## 2025-03-24 LAB
ALBUMIN SERPL BCP-MCNC: 3.4 G/DL (ref 3.4–5)
ALP SERPL-CCNC: 68 U/L (ref 33–110)
ALT SERPL W P-5'-P-CCNC: 10 U/L (ref 7–45)
ANION GAP SERPL CALC-SCNC: 11 MMOL/L (ref 10–20)
AST SERPL W P-5'-P-CCNC: 12 U/L (ref 9–39)
BASOPHILS # BLD AUTO: 0.11 X10*3/UL (ref 0–0.1)
BASOPHILS NFR BLD AUTO: 1 %
BILIRUB SERPL-MCNC: 1.1 MG/DL (ref 0–1.2)
BUN SERPL-MCNC: 9 MG/DL (ref 6–23)
CALCIUM SERPL-MCNC: 9.1 MG/DL (ref 8.6–10.6)
CHLORIDE SERPL-SCNC: 109 MMOL/L (ref 98–107)
CO2 SERPL-SCNC: 23 MMOL/L (ref 21–32)
CREAT SERPL-MCNC: 1.07 MG/DL (ref 0.5–1.05)
EGFRCR SERPLBLD CKD-EPI 2021: 60 ML/MIN/1.73M*2
EOSINOPHIL # BLD AUTO: 0.09 X10*3/UL (ref 0–0.7)
EOSINOPHIL NFR BLD AUTO: 0.8 %
ERYTHROCYTE [DISTWIDTH] IN BLOOD BY AUTOMATED COUNT: 15.9 % (ref 11.5–14.5)
FERRITIN SERPL-MCNC: 490 NG/ML (ref 8–150)
GLUCOSE SERPL-MCNC: 93 MG/DL (ref 74–99)
HCT VFR BLD AUTO: 39.5 % (ref 36–46)
HGB BLD-MCNC: 12.7 G/DL (ref 12–16)
HOLD SPECIMEN: NORMAL
HOLD SPECIMEN: NORMAL
IMM GRANULOCYTES # BLD AUTO: 0.06 X10*3/UL (ref 0–0.7)
IMM GRANULOCYTES NFR BLD AUTO: 0.5 % (ref 0–0.9)
IRON SATN MFR SERPL: 9 % (ref 25–45)
IRON SERPL-MCNC: 23 UG/DL (ref 35–150)
LYMPHOCYTES # BLD AUTO: 2.29 X10*3/UL (ref 1.2–4.8)
LYMPHOCYTES NFR BLD AUTO: 20.8 %
MCH RBC QN AUTO: 29 PG (ref 26–34)
MCHC RBC AUTO-ENTMCNC: 32.2 G/DL (ref 32–36)
MCV RBC AUTO: 90 FL (ref 80–100)
MONOCYTES # BLD AUTO: 0.91 X10*3/UL (ref 0.1–1)
MONOCYTES NFR BLD AUTO: 8.3 %
NEUTROPHILS # BLD AUTO: 7.54 X10*3/UL (ref 1.2–7.7)
NEUTROPHILS NFR BLD AUTO: 68.6 %
NRBC BLD-RTO: 0 /100 WBCS (ref 0–0)
PLATELET # BLD AUTO: 443 X10*3/UL (ref 150–450)
POTASSIUM SERPL-SCNC: 4.5 MMOL/L (ref 3.5–5.3)
PROT SERPL-MCNC: 7 G/DL (ref 6.4–8.2)
RBC # BLD AUTO: 4.38 X10*6/UL (ref 4–5.2)
SODIUM SERPL-SCNC: 138 MMOL/L (ref 136–145)
TIBC SERPL-MCNC: 251 UG/DL (ref 240–445)
UIBC SERPL-MCNC: 228 UG/DL (ref 110–370)
VIT B12 SERPL-MCNC: 695 PG/ML (ref 211–911)
WBC # BLD AUTO: 11 X10*3/UL (ref 4.4–11.3)

## 2025-03-24 PROCEDURE — 82607 VITAMIN B-12: CPT

## 2025-03-24 PROCEDURE — 83550 IRON BINDING TEST: CPT

## 2025-03-24 PROCEDURE — 82728 ASSAY OF FERRITIN: CPT

## 2025-03-24 PROCEDURE — 85025 COMPLETE CBC W/AUTO DIFF WBC: CPT

## 2025-03-24 PROCEDURE — 80053 COMPREHEN METABOLIC PANEL: CPT

## 2025-03-24 PROCEDURE — 83540 ASSAY OF IRON: CPT

## 2025-03-28 ENCOUNTER — PATIENT OUTREACH (OUTPATIENT)
Dept: PRIMARY CARE | Facility: CLINIC | Age: 60
End: 2025-03-28
Payer: MEDICARE

## 2025-03-31 ENCOUNTER — OFFICE VISIT (OUTPATIENT)
Dept: HEMATOLOGY/ONCOLOGY | Facility: CLINIC | Age: 60
End: 2025-03-31
Payer: MEDICARE

## 2025-03-31 VITALS
BODY MASS INDEX: 28.73 KG/M2 | DIASTOLIC BLOOD PRESSURE: 92 MMHG | TEMPERATURE: 97.3 F | WEIGHT: 183.42 LBS | SYSTOLIC BLOOD PRESSURE: 141 MMHG | OXYGEN SATURATION: 100 % | RESPIRATION RATE: 18 BRPM | HEART RATE: 85 BPM

## 2025-03-31 DIAGNOSIS — D50.0 IRON DEFICIENCY ANEMIA DUE TO CHRONIC BLOOD LOSS: Primary | ICD-10-CM

## 2025-03-31 DIAGNOSIS — D64.9 ANEMIA, UNSPECIFIED TYPE: ICD-10-CM

## 2025-03-31 PROCEDURE — 3077F SYST BP >= 140 MM HG: CPT | Performed by: INTERNAL MEDICINE

## 2025-03-31 PROCEDURE — 3080F DIAST BP >= 90 MM HG: CPT | Performed by: INTERNAL MEDICINE

## 2025-03-31 PROCEDURE — 1036F TOBACCO NON-USER: CPT | Performed by: INTERNAL MEDICINE

## 2025-03-31 PROCEDURE — 99215 OFFICE O/P EST HI 40 MIN: CPT | Performed by: INTERNAL MEDICINE

## 2025-03-31 RX ORDER — EPINEPHRINE 0.3 MG/.3ML
0.3 INJECTION SUBCUTANEOUS EVERY 5 MIN PRN
Status: CANCELLED | OUTPATIENT
Start: 2025-04-07

## 2025-03-31 RX ORDER — ALBUTEROL SULFATE 0.83 MG/ML
3 SOLUTION RESPIRATORY (INHALATION) AS NEEDED
Status: CANCELLED | OUTPATIENT
Start: 2025-04-07

## 2025-03-31 RX ORDER — FAMOTIDINE 10 MG/ML
20 INJECTION, SOLUTION INTRAVENOUS ONCE AS NEEDED
OUTPATIENT
Start: 2025-04-07

## 2025-03-31 RX ORDER — ALBUTEROL SULFATE 0.83 MG/ML
3 SOLUTION RESPIRATORY (INHALATION) AS NEEDED
OUTPATIENT
Start: 2025-04-07

## 2025-03-31 RX ORDER — EPINEPHRINE 0.3 MG/.3ML
0.3 INJECTION SUBCUTANEOUS EVERY 5 MIN PRN
OUTPATIENT
Start: 2025-04-07

## 2025-03-31 RX ORDER — FAMOTIDINE 10 MG/ML
20 INJECTION, SOLUTION INTRAVENOUS ONCE AS NEEDED
Status: CANCELLED | OUTPATIENT
Start: 2025-04-07

## 2025-03-31 RX ORDER — DIPHENHYDRAMINE HYDROCHLORIDE 50 MG/ML
50 INJECTION, SOLUTION INTRAMUSCULAR; INTRAVENOUS AS NEEDED
Status: CANCELLED | OUTPATIENT
Start: 2025-04-07

## 2025-03-31 RX ORDER — DIPHENHYDRAMINE HYDROCHLORIDE 50 MG/ML
50 INJECTION, SOLUTION INTRAMUSCULAR; INTRAVENOUS AS NEEDED
OUTPATIENT
Start: 2025-04-07

## 2025-03-31 ASSESSMENT — PAIN SCALES - GENERAL: PAINLEVEL_OUTOF10: 0-NO PAIN

## 2025-03-31 NOTE — PROGRESS NOTES
Pt seen in office today for a follow up visit with Dr. Luis A Painter for management of her CYNTHIA.  She is without complaints today and denies pain.     Medications, pharmacy preference and allergies were reviewed with patient and updated in the medical record by MD.     Per orders, labs were obtained on 3/24/25 and results are to be reviewed in office today by MD with patient.She is to RTC in 6 months for a FUV with TITI Askew with labs prior to her visit.She is also to have 6 Venofer infusions weekly starting next week.    Our contact information was given to patient and they were encouraged to contact us with any questions or concerns.    Patient verbalized understanding and agreement regarding discussed information via verbal feedback. Pt escorted to scheduling.

## 2025-03-31 NOTE — PROGRESS NOTES
Patient ID: Paula Campbell is a 59 y.o. female.  Reason for Referral: Anemia  Date of Initial Consult: June 24, 2024    Subjective:    On presentation today she reports she is doing well overall.  She denies any fevers, chills or night sweats.  No cough, chest pain or shortness of breath.  No nausea or vomiting.  No constipation or diarrhea.  Hematuria has resolved.  She no longer requires self-catheterization.  She does have continued lower extremity lymphedema left greater than right utilizes a cane for ambulation due to her lymphedema.     Oncologic History:  Vaginal Cancer 2004  Pelvic external beam radiation with concurrent Cisplatin chemotherapy from 4/9/04 - 5/14/04 followed by low dose brachytherapy given in two applications on 5/25/04 and 6/3/04    Radiation cystitis  Hyperbaric oxygen January - April 2024    History of Present Illness:  06/24/24: Patient presents for initial hematology consultation for anemia believed to be in the setting of radiation  cystitis. Patient was treated for vaginal cancer in 2004 treated with chemotherapy and radiation therapy.  She had experienced hematuria (with significant clotting). She underwent hyperbaric oxygen January - April 2024. Was requiring several catheterization due to neurogenic bladder this issue has resolved since receiving hyperbaric oxygen treatment.  She does continue to get frequent urinary tract infections and is followed by Dr. Carbajal.    Patient presented to the emergency room on November 27, 2023 for worsening hematuria and clotting.  She was found to have severe anemia at that time with a hemoglobin of 3.8.  She reports receiving 1 dose of IV iron while hospitalized.  She also received 6 units of packed red blood cells.  This prompted further investigation leading to eventual hyperbaric oxygen treatment.  Hematuria has resolved. She has been on oral iron since November 2023       Review of Systems:  A review of systems has been completed and are  negative for complaints except what is stated in the HPI and/or past medical history    Allergies:  Amoxicillin, penicillins, alendronic acid, sulfa, clindamycin,  pip-tazo    Medications:  Medication list reviewed with patient and updated in EMR    Past Medical History:  Osteoporosis, HTN    Past Surgical History:  Right nephrectomy 2018), gyn as above, left wrist     Health Maintenance  Colonoscopy  (follow up )  Mammogram  (due)    Family History:  Father - lung cancer (smoker)  Brother - Leukemia     Vital Signs:  BP (!) 141/92 (BP Location: Left arm, Patient Position: Sitting, BP Cuff Size: Adult long)   Pulse 85   Temp 36.3 °C (97.3 °F) (Temporal)   Resp 18   Wt 83.2 kg (183 lb 6.8 oz)   SpO2 100%   BMI 28.73 kg/m²     Physical Exam:  ECO  Pain: 0  Constitutional: Well developed, awake/alert/oriented x3, no distress, alert and cooperative  Eyes: PER. sclera anicteric  ENMT: Oral mucosa moist  Respiratory/Thorax: Breathing is non-labored. Lungs are clear to auscultation bilaterally. No adventitious breath sounds  Cardiovascular: S1-S2. Regular rate and rhythm. No murmurs, rubs, or gallops appreciated  Gastrointestinal: Abdomen soft nontender, nondistended, normal active bowel sounds.  Musculoskeletal: ROM intact, no joint swelling, normal strength  Extremities: normal extremities, no cyanosis, no clubbing.  Lower extremity edema left greater than right.  Neurologic: alert and oriented x3. Nonfocal exam. No myoclonus  Psychological: Pleasant, appropriate and easily engaged     Lab Results:  2023  WBC 7.8, hemoglobin 4.1, hematocrit 14.2, platelets 358,000  Repeat drawn laboratory 2023 hemoglobin 3.8, hematocrit 13.4    2023  Hemoglobin 6.6, hematocrit 20.2    2023  Iron 13, TIBC 322, percent saturation 4    2024  WBC 5.8, hemoglobin 10.9, hematocrit 35.3, MCV 79, platelets 354,000    May 31, 2024  Vitamin B12 388    Assessment/Plan:  1-  CYNTHIA:    Due to radiation cystitis causing hematuria. She is s/p IV iron in the hospital x1.     09/18/24 labs: iron def without anemia. But she has iron def related symptoms and could not tolerate oral iron. Venofer 200 mg x 4 weekly is ordered    03/31/25: Persistent iron def. Due to recurrent hematuria after ureteral exchange done recently. Venofer 300 mg x 5 weekly is ordered.    2- Vit b12 def: sublingual vit b12 every day is ordered for 6 months    RTC in 6 months with labs    Luis A Painter MD

## 2025-04-01 NOTE — DOCUMENTATION CLARIFICATION NOTE
"    PATIENT:               RENETTA TERRELL  ACCT #:                  [id number]  MRN:                       [id number]  :                       1965  ADMIT DATE:       3/13/2025 11:00 AM  DISCH DATE:        3/16/2025 6:55 PM  RESPONDING PROVIDER #:        [id number]          PROVIDER RESPONSE TEXT:    UTI/hydronephrosis related to the ureteral stent    CDI QUERY TEXT:    Clarification        Instruction:    Based on your assessment of the patient and the clinical information, please provide the requested documentation by clicking on the appropriate radio button and enter any additional information if prompted.    Question: Please clarify the relationship between the UTI/hydronephrosis and the ureteral stent    When answering this query, please exercise your independent professional judgment. The fact that a question is being asked, does not imply that any particular answer is desired or expected.    The patient's clinical indicators include:  Clinical Information: Per ER provider note: \"...complaining of abdominal pain, vomiting, diarrhea and cough\"    Clinical Indicators:    Per Urology consult: \"Patient with left-sided hydronephrosis with elevated creatinine and leukocytosis likely from a displaced ureteral stent.\"    Per primary care note 3/14/25: \"UTI\"        Treatment:    Per Urology consult: \"...stent exchange, retrograde pyelogram.\"    Per primary care note 3/14/25 \"Continue IV antibiotics\"    Risk Factors:    Per ER provider note: \"CT scan showed left hydronephrosis and a displaced ureteral stent.\"  Options provided:  -- UTI/hydronephrosis related to the ureteral stent  -- UTI/hydronephrosis unrelated to the ureteral stent  -- Other - I will add my own diagnosis  -- Refer to Clinical Documentation Reviewer    Query created by: Ember Robles on 3/31/2025 9:22 AM      Electronically signed by:  ITALIA SANTILLAN MD 2025 11:05 AM          "

## 2025-04-10 ENCOUNTER — INFUSION (OUTPATIENT)
Dept: HEMATOLOGY/ONCOLOGY | Facility: CLINIC | Age: 60
End: 2025-04-10
Payer: MEDICARE

## 2025-04-10 VITALS
WEIGHT: 185.08 LBS | TEMPERATURE: 97.3 F | OXYGEN SATURATION: 99 % | BODY MASS INDEX: 28.99 KG/M2 | HEART RATE: 78 BPM | SYSTOLIC BLOOD PRESSURE: 138 MMHG | DIASTOLIC BLOOD PRESSURE: 83 MMHG | RESPIRATION RATE: 18 BRPM

## 2025-04-10 DIAGNOSIS — D50.0 IRON DEFICIENCY ANEMIA DUE TO CHRONIC BLOOD LOSS: ICD-10-CM

## 2025-04-10 PROCEDURE — 2500000004 HC RX 250 GENERAL PHARMACY W/ HCPCS (ALT 636 FOR OP/ED): Performed by: INTERNAL MEDICINE

## 2025-04-10 PROCEDURE — 96374 THER/PROPH/DIAG INJ IV PUSH: CPT | Mod: INF

## 2025-04-10 RX ORDER — DIPHENHYDRAMINE HYDROCHLORIDE 50 MG/ML
50 INJECTION, SOLUTION INTRAMUSCULAR; INTRAVENOUS AS NEEDED
OUTPATIENT
Start: 2025-04-14

## 2025-04-10 RX ORDER — EPINEPHRINE 0.3 MG/.3ML
0.3 INJECTION SUBCUTANEOUS EVERY 5 MIN PRN
OUTPATIENT
Start: 2025-04-14

## 2025-04-10 RX ORDER — FAMOTIDINE 10 MG/ML
20 INJECTION, SOLUTION INTRAVENOUS ONCE AS NEEDED
OUTPATIENT
Start: 2025-04-14

## 2025-04-10 RX ORDER — ALBUTEROL SULFATE 0.83 MG/ML
3 SOLUTION RESPIRATORY (INHALATION) AS NEEDED
OUTPATIENT
Start: 2025-04-14

## 2025-04-10 RX ADMIN — IRON SUCROSE 300 MG: 20 INJECTION, SOLUTION INTRAVENOUS at 11:18

## 2025-04-10 ASSESSMENT — PAIN SCALES - GENERAL: PAINLEVEL_OUTOF10: 0-NO PAIN

## 2025-04-10 NOTE — PROGRESS NOTES
VS stable post infusion, completed observation period. Pt denies any issues or complaints at this time. Patient aware of potential side effects of Venofer. Reviewed follow up appointments with pt, verbalized understanding.  Discharged home  Awilda James RN

## 2025-04-17 ENCOUNTER — PATIENT OUTREACH (OUTPATIENT)
Dept: PRIMARY CARE | Facility: CLINIC | Age: 60
End: 2025-04-17
Payer: MEDICARE

## 2025-04-17 ENCOUNTER — APPOINTMENT (OUTPATIENT)
Dept: CARDIOLOGY | Facility: HOSPITAL | Age: 60
End: 2025-04-17
Payer: MEDICARE

## 2025-04-17 ENCOUNTER — HOSPITAL ENCOUNTER (EMERGENCY)
Facility: HOSPITAL | Age: 60
Discharge: HOME | End: 2025-04-17
Attending: EMERGENCY MEDICINE
Payer: MEDICARE

## 2025-04-17 VITALS
HEIGHT: 67 IN | DIASTOLIC BLOOD PRESSURE: 70 MMHG | OXYGEN SATURATION: 98 % | SYSTOLIC BLOOD PRESSURE: 140 MMHG | WEIGHT: 185 LBS | BODY MASS INDEX: 29.03 KG/M2 | TEMPERATURE: 98.6 F | HEART RATE: 80 BPM | RESPIRATION RATE: 18 BRPM

## 2025-04-17 DIAGNOSIS — A49.9 BACTERIAL URINARY INFECTION: ICD-10-CM

## 2025-04-17 DIAGNOSIS — R11.2 NAUSEA AND VOMITING, UNSPECIFIED VOMITING TYPE: Primary | ICD-10-CM

## 2025-04-17 DIAGNOSIS — N39.0 BACTERIAL URINARY INFECTION: ICD-10-CM

## 2025-04-17 LAB
ALBUMIN SERPL BCP-MCNC: 4.3 G/DL (ref 3.4–5)
ALP SERPL-CCNC: 57 U/L (ref 33–110)
ALT SERPL W P-5'-P-CCNC: 7 U/L (ref 7–45)
ANION GAP SERPL CALC-SCNC: 16 MMOL/L (ref 10–20)
APPEARANCE UR: ABNORMAL
AST SERPL W P-5'-P-CCNC: 11 U/L (ref 9–39)
BASOPHILS # BLD AUTO: 0.06 X10*3/UL (ref 0–0.1)
BASOPHILS NFR BLD AUTO: 0.5 %
BILIRUB SERPL-MCNC: 0.8 MG/DL (ref 0–1.2)
BILIRUB UR STRIP.AUTO-MCNC: NEGATIVE MG/DL
BUN SERPL-MCNC: 12 MG/DL (ref 6–23)
CALCIUM SERPL-MCNC: 10.3 MG/DL (ref 8.6–10.3)
CARDIAC TROPONIN I PNL SERPL HS: 5 NG/L (ref 0–13)
CHLORIDE SERPL-SCNC: 103 MMOL/L (ref 98–107)
CO2 SERPL-SCNC: 26 MMOL/L (ref 21–32)
COLOR UR: YELLOW
CREAT SERPL-MCNC: 1.3 MG/DL (ref 0.5–1.05)
EGFRCR SERPLBLD CKD-EPI 2021: 48 ML/MIN/1.73M*2
EOSINOPHIL # BLD AUTO: 0.03 X10*3/UL (ref 0–0.7)
EOSINOPHIL NFR BLD AUTO: 0.2 %
ERYTHROCYTE [DISTWIDTH] IN BLOOD BY AUTOMATED COUNT: 15.1 % (ref 11.5–14.5)
FLUAV RNA RESP QL NAA+PROBE: NOT DETECTED
FLUBV RNA RESP QL NAA+PROBE: NOT DETECTED
GLUCOSE SERPL-MCNC: 134 MG/DL (ref 74–99)
GLUCOSE UR STRIP.AUTO-MCNC: NORMAL MG/DL
HCT VFR BLD AUTO: 41 % (ref 36–46)
HGB BLD-MCNC: 13.7 G/DL (ref 12–16)
IMM GRANULOCYTES # BLD AUTO: 0.04 X10*3/UL (ref 0–0.7)
IMM GRANULOCYTES NFR BLD AUTO: 0.3 % (ref 0–0.9)
KETONES UR STRIP.AUTO-MCNC: NEGATIVE MG/DL
LACTATE SERPL-SCNC: 1.8 MMOL/L (ref 0.4–2)
LEUKOCYTE ESTERASE UR QL STRIP.AUTO: ABNORMAL
LYMPHOCYTES # BLD AUTO: 1.88 X10*3/UL (ref 1.2–4.8)
LYMPHOCYTES NFR BLD AUTO: 14.8 %
MCH RBC QN AUTO: 28.1 PG (ref 26–34)
MCHC RBC AUTO-ENTMCNC: 33.4 G/DL (ref 32–36)
MCV RBC AUTO: 84 FL (ref 80–100)
MONOCYTES # BLD AUTO: 0.77 X10*3/UL (ref 0.1–1)
MONOCYTES NFR BLD AUTO: 6.1 %
MUCOUS THREADS #/AREA URNS AUTO: ABNORMAL /LPF
NEUTROPHILS # BLD AUTO: 9.89 X10*3/UL (ref 1.2–7.7)
NEUTROPHILS NFR BLD AUTO: 78.1 %
NITRITE UR QL STRIP.AUTO: NEGATIVE
NRBC BLD-RTO: 0 /100 WBCS (ref 0–0)
PH UR STRIP.AUTO: 8.5 [PH]
PLATELET # BLD AUTO: 363 X10*3/UL (ref 150–450)
POTASSIUM SERPL-SCNC: 3.9 MMOL/L (ref 3.5–5.3)
PROT SERPL-MCNC: 8.4 G/DL (ref 6.4–8.2)
PROT UR STRIP.AUTO-MCNC: ABNORMAL MG/DL
RBC # BLD AUTO: 4.87 X10*6/UL (ref 4–5.2)
RBC # UR STRIP.AUTO: ABNORMAL MG/DL
RBC #/AREA URNS AUTO: >20 /HPF
RSV RNA RESP QL NAA+PROBE: NOT DETECTED
SARS-COV-2 RNA RESP QL NAA+PROBE: NOT DETECTED
SODIUM SERPL-SCNC: 141 MMOL/L (ref 136–145)
SP GR UR STRIP.AUTO: 1.02
SQUAMOUS #/AREA URNS AUTO: ABNORMAL /HPF
UROBILINOGEN UR STRIP.AUTO-MCNC: NORMAL MG/DL
WBC # BLD AUTO: 12.7 X10*3/UL (ref 4.4–11.3)
WBC #/AREA URNS AUTO: >50 /HPF

## 2025-04-17 PROCEDURE — 99284 EMERGENCY DEPT VISIT MOD MDM: CPT | Performed by: EMERGENCY MEDICINE

## 2025-04-17 PROCEDURE — 85025 COMPLETE CBC W/AUTO DIFF WBC: CPT | Performed by: NURSE PRACTITIONER

## 2025-04-17 PROCEDURE — 2500000004 HC RX 250 GENERAL PHARMACY W/ HCPCS (ALT 636 FOR OP/ED): Performed by: EMERGENCY MEDICINE

## 2025-04-17 PROCEDURE — 81001 URINALYSIS AUTO W/SCOPE: CPT | Performed by: NURSE PRACTITIONER

## 2025-04-17 PROCEDURE — 87086 URINE CULTURE/COLONY COUNT: CPT | Mod: AHULAB | Performed by: NURSE PRACTITIONER

## 2025-04-17 PROCEDURE — 2500000001 HC RX 250 WO HCPCS SELF ADMINISTERED DRUGS (ALT 637 FOR MEDICARE OP): Performed by: EMERGENCY MEDICINE

## 2025-04-17 PROCEDURE — 93005 ELECTROCARDIOGRAM TRACING: CPT

## 2025-04-17 PROCEDURE — 36415 COLL VENOUS BLD VENIPUNCTURE: CPT | Performed by: NURSE PRACTITIONER

## 2025-04-17 PROCEDURE — 84484 ASSAY OF TROPONIN QUANT: CPT | Performed by: NURSE PRACTITIONER

## 2025-04-17 PROCEDURE — 80053 COMPREHEN METABOLIC PANEL: CPT | Performed by: NURSE PRACTITIONER

## 2025-04-17 PROCEDURE — 87637 SARSCOV2&INF A&B&RSV AMP PRB: CPT | Performed by: NURSE PRACTITIONER

## 2025-04-17 PROCEDURE — 96374 THER/PROPH/DIAG INJ IV PUSH: CPT

## 2025-04-17 PROCEDURE — 83605 ASSAY OF LACTIC ACID: CPT | Performed by: NURSE PRACTITIONER

## 2025-04-17 RX ORDER — CIPROFLOXACIN 500 MG/1
500 TABLET ORAL ONCE
Status: COMPLETED | OUTPATIENT
Start: 2025-04-17 | End: 2025-04-17

## 2025-04-17 RX ORDER — ONDANSETRON HYDROCHLORIDE 2 MG/ML
4 INJECTION, SOLUTION INTRAVENOUS ONCE
Status: COMPLETED | OUTPATIENT
Start: 2025-04-17 | End: 2025-04-17

## 2025-04-17 RX ORDER — TRAMADOL HYDROCHLORIDE 50 MG/1
50 TABLET ORAL ONCE
Status: COMPLETED | OUTPATIENT
Start: 2025-04-17 | End: 2025-04-17

## 2025-04-17 RX ORDER — TRAMADOL HYDROCHLORIDE 50 MG/1
50 TABLET ORAL EVERY 6 HOURS PRN
Qty: 15 TABLET | Refills: 0 | Status: SHIPPED | OUTPATIENT
Start: 2025-04-17 | End: 2025-04-21

## 2025-04-17 RX ORDER — ONDANSETRON HYDROCHLORIDE 8 MG/1
8 TABLET, FILM COATED ORAL EVERY 8 HOURS PRN
Qty: 15 TABLET | Refills: 0 | Status: SHIPPED | OUTPATIENT
Start: 2025-04-17

## 2025-04-17 RX ORDER — CIPROFLOXACIN 500 MG/1
500 TABLET ORAL 2 TIMES DAILY
Qty: 10 TABLET | Refills: 0 | Status: SHIPPED | OUTPATIENT
Start: 2025-04-17 | End: 2025-04-22

## 2025-04-17 RX ADMIN — ONDANSETRON 4 MG: 2 INJECTION, SOLUTION INTRAMUSCULAR; INTRAVENOUS at 23:01

## 2025-04-17 RX ADMIN — CIPROFLOXACIN 500 MG: 500 TABLET ORAL at 23:01

## 2025-04-17 RX ADMIN — TRAMADOL HYDROCHLORIDE 50 MG: 50 TABLET, COATED ORAL at 23:01

## 2025-04-17 ASSESSMENT — PAIN DESCRIPTION - PAIN TYPE: TYPE: ACUTE PAIN

## 2025-04-17 ASSESSMENT — PAIN SCALES - GENERAL
PAINLEVEL_OUTOF10: 0 - NO PAIN
PAINLEVEL_OUTOF10: 3

## 2025-04-17 ASSESSMENT — PAIN DESCRIPTION - LOCATION: LOCATION: ABDOMEN

## 2025-04-17 ASSESSMENT — PAIN - FUNCTIONAL ASSESSMENT: PAIN_FUNCTIONAL_ASSESSMENT: 0-10

## 2025-04-17 NOTE — ED TRIAGE NOTES
Patient presents with cramping and vomiting, vomited black a day ago, had a recent iron infusion, iron deficiency, patient has 1 kidney, patient has stent in other kidney,

## 2025-04-17 NOTE — PROGRESS NOTES
Unable to reach patient for monthly post discharge follow up. Previous outreach attempt also failed. LVM with call back number for patient to call if needed. TCM program closed. Will reopen if patient response received.

## 2025-04-17 NOTE — ED TRIAGE NOTES
As provider-in-triage, I performed a medical screening history and physical exam on this patient.    HISTORY OF PRESENT ILLNESS: 59-year-old -American female presents with abdominal pain x 2 days after drinking fruit smoothies.  She endorses associated chills, nausea and coffee ground emesis every 2 hours x 2 days.  Last stool was on Monday which is normal for her.  She reports history of vaginal cancer recent urinary bladder stent replaced with hemorrhage requiring iron infusions.  She denies fevers or diarrhea.    PHYSICAL EXAM  Vital Signs reviewed.  Well-appearing, ambulating unassisted, no acute distress, heart is regular rate and rhythm, lungs clear to auscultation  Abdomen soft, nontender, nondistended, bowel sounds x 4.     MDM  Plan: Workup initiated. Patient to be moved to the main ER or FT, pending bed availability, for further evaluation, diagnosis, treatment, and disposition.   For the remainder of the patient's workup and ED course, please see the main ED provider note.    CBC, CMP, lactic  Viral swab  UA  EKG  CT abdomen pelvis with IV contrast    I evaluated this patient in triage with the RN. Due to the patient's complaint labs and or imaging were ordered by myself in an attempt to expedite patient care however I am not participating in care after evaluation. This is a preliminary assessment. Pt does not appear in acute distress at this time. They are stable and will have a full evaluation as soon as possible. They will be cared for by another provider who will possibly order more labs, imaging or interventions. Pt did not have a full ROS or PE completed by myself however below is a summary with reasons for orders.

## 2025-04-18 ENCOUNTER — INFUSION (OUTPATIENT)
Dept: HEMATOLOGY/ONCOLOGY | Facility: CLINIC | Age: 60
End: 2025-04-18
Payer: MEDICARE

## 2025-04-18 VITALS
HEART RATE: 80 BPM | DIASTOLIC BLOOD PRESSURE: 85 MMHG | TEMPERATURE: 97.9 F | BODY MASS INDEX: 28.5 KG/M2 | OXYGEN SATURATION: 97 % | WEIGHT: 181.99 LBS | SYSTOLIC BLOOD PRESSURE: 134 MMHG

## 2025-04-18 DIAGNOSIS — D50.0 IRON DEFICIENCY ANEMIA DUE TO CHRONIC BLOOD LOSS: ICD-10-CM

## 2025-04-18 LAB
ATRIAL RATE: 87 BPM
P AXIS: 53 DEGREES
P OFFSET: 203 MS
P ONSET: 157 MS
PR INTERVAL: 136 MS
Q ONSET: 225 MS
QRS COUNT: 14 BEATS
QRS DURATION: 70 MS
QT INTERVAL: 346 MS
QTC CALCULATION(BAZETT): 416 MS
QTC FREDERICIA: 391 MS
R AXIS: 16 DEGREES
T AXIS: 52 DEGREES
T OFFSET: 398 MS
VENTRICULAR RATE: 87 BPM

## 2025-04-18 PROCEDURE — 96365 THER/PROPH/DIAG IV INF INIT: CPT | Mod: INF

## 2025-04-18 PROCEDURE — 2500000004 HC RX 250 GENERAL PHARMACY W/ HCPCS (ALT 636 FOR OP/ED): Performed by: INTERNAL MEDICINE

## 2025-04-18 PROCEDURE — 96366 THER/PROPH/DIAG IV INF ADDON: CPT | Mod: INF

## 2025-04-18 RX ORDER — ALBUTEROL SULFATE 0.83 MG/ML
3 SOLUTION RESPIRATORY (INHALATION) AS NEEDED
OUTPATIENT
Start: 2025-04-21

## 2025-04-18 RX ORDER — EPINEPHRINE 0.3 MG/.3ML
0.3 INJECTION SUBCUTANEOUS EVERY 5 MIN PRN
OUTPATIENT
Start: 2025-04-21

## 2025-04-18 RX ORDER — DIPHENHYDRAMINE HYDROCHLORIDE 50 MG/ML
50 INJECTION, SOLUTION INTRAMUSCULAR; INTRAVENOUS AS NEEDED
Status: DISCONTINUED | OUTPATIENT
Start: 2025-04-18 | End: 2025-04-18 | Stop reason: HOSPADM

## 2025-04-18 RX ORDER — HEPARIN SODIUM,PORCINE/PF 10 UNIT/ML
50 SYRINGE (ML) INTRAVENOUS AS NEEDED
OUTPATIENT
Start: 2025-04-18

## 2025-04-18 RX ORDER — DIPHENHYDRAMINE HYDROCHLORIDE 50 MG/ML
50 INJECTION, SOLUTION INTRAMUSCULAR; INTRAVENOUS AS NEEDED
OUTPATIENT
Start: 2025-04-21

## 2025-04-18 RX ORDER — FAMOTIDINE 10 MG/ML
20 INJECTION, SOLUTION INTRAVENOUS ONCE AS NEEDED
OUTPATIENT
Start: 2025-04-21

## 2025-04-18 RX ORDER — EPINEPHRINE 0.3 MG/.3ML
0.3 INJECTION SUBCUTANEOUS EVERY 5 MIN PRN
Status: DISCONTINUED | OUTPATIENT
Start: 2025-04-18 | End: 2025-04-18 | Stop reason: HOSPADM

## 2025-04-18 RX ORDER — HEPARIN 100 UNIT/ML
500 SYRINGE INTRAVENOUS AS NEEDED
OUTPATIENT
Start: 2025-04-18

## 2025-04-18 RX ORDER — FAMOTIDINE 10 MG/ML
20 INJECTION, SOLUTION INTRAVENOUS ONCE AS NEEDED
Status: DISCONTINUED | OUTPATIENT
Start: 2025-04-18 | End: 2025-04-18 | Stop reason: HOSPADM

## 2025-04-18 RX ORDER — ALBUTEROL SULFATE 0.83 MG/ML
3 SOLUTION RESPIRATORY (INHALATION) AS NEEDED
Status: DISCONTINUED | OUTPATIENT
Start: 2025-04-18 | End: 2025-04-18 | Stop reason: HOSPADM

## 2025-04-18 RX ADMIN — IRON SUCROSE 300 MG: 20 INJECTION, SOLUTION INTRAVENOUS at 12:36

## 2025-04-18 ASSESSMENT — PAIN SCALES - GENERAL: PAINLEVEL_OUTOF10: 0-NO PAIN

## 2025-04-18 NOTE — PROGRESS NOTES
pt tolerated today's Venofer infusions without difficulty. VS stable post and pt declines monitoring period. HSR S/S reviewed with pt and pt aware to seek emergency care if she experiences any symptoms. pt aware of upcoming appt schedule and will call with any questions and/or concerns.     Detail Level: Detailed Detail Level: Generalized Detail Level: Zone

## 2025-04-18 NOTE — ED PROVIDER NOTES
HPI   Chief Complaint   Patient presents with    Vomiting    Nausea       Chief complaint: Abdominal pain, nausea vomiting    History of present illness: Patient is a 59-year-old female with history of remote vaginal cancer and subsequent absence of a kidney, hyperparathyroidism presented emergency department complaints of nausea vomiting.  According to the patient, she has been experiencing the symptoms over the past 2 days.  The patient denies any fever that she denies recent use of antibiotics.  She denies any recent travel or sick contacts.  Patient states that she has been having a diffuse abdominal discomfort primarily in her upper abdomen.  The patient states that she had 1 episode of vomiting black however, the patient denies any melena.  Concern for the symptoms, the patient presents to the emergency department for further evaluation.  She has no other complaints at this time.        History provided by:  Patient   used: No            Patient History   Medical History[1]  Surgical History[2]  Family History[3]  Social History[4]    Physical Exam   ED Triage Vitals   Temperature Heart Rate Respirations BP   04/17/25 1739 04/17/25 1739 04/17/25 1739 04/17/25 1739   37 °C (98.6 °F) 98 18 (!) 166/130      Pulse Ox Temp src Heart Rate Source Patient Position   04/17/25 1739 -- 04/17/25 2008 04/17/25 2008   98 %  Monitor Sitting      BP Location FiO2 (%)     04/17/25 2008 --     Left arm        Physical Exam  Constitutional:       Appearance: Normal appearance.   HENT:      Head: Normocephalic and atraumatic.      Right Ear: External ear normal.      Left Ear: External ear normal.      Nose: Nose normal.      Mouth/Throat:      Mouth: Mucous membranes are moist.   Eyes:      General: Lids are normal.      Extraocular Movements: Extraocular movements intact.      Pupils: Pupils are equal, round, and reactive to light.   Cardiovascular:      Rate and Rhythm: Normal rate and regular rhythm.       Heart sounds: Normal heart sounds.   Pulmonary:      Effort: Pulmonary effort is normal.      Breath sounds: Normal breath sounds.   Abdominal:      General: Abdomen is flat.      Palpations: Abdomen is soft.      Tenderness: There is abdominal tenderness in the epigastric area. There is no guarding or rebound.   Musculoskeletal:         General: No deformity. Normal range of motion.      Cervical back: Normal range of motion and neck supple.   Skin:     General: Skin is warm.      Capillary Refill: Capillary refill takes less than 2 seconds.      Coloration: Skin is not jaundiced.   Neurological:      General: No focal deficit present.      Mental Status: She is alert and oriented to person, place, and time.   Psychiatric:         Mood and Affect: Mood normal.         Behavior: Behavior normal.           ED Course & MDM   Diagnoses as of 04/17/25 2242   Nausea and vomiting, unspecified vomiting type   Bacterial urinary infection                 No data recorded     Samra Coma Scale Score: 15 (04/17/25 2216 : Kinza Carballo RN)                           Medical Decision Making  Medical decision making: Patient remained stable during my time with her in the emergency department.  CBC demonstrated white cell count of 12.7.  The patient's Chem-7 and LFTs were all within normal limits influenza COVID and RSV studies were all within normal limits troponin was negative.  Lactate was normal.  Urinalysis demonstrated 500 leukocyte esterase loaded white and red blood cells but no other significant abnormalities.  The patient's EKG demonstrated a normal sinus rhythm with a rate of 87 bpm isoelectric ST segments narrow QRS complexes and a QTc of 416.    Patient presents to the emergency department with complaints of abdominal pain.  Workup was performed as above and demonstrated only the presence of urinary tract infection.  Patient was given Ultram Zofran as well as a dose of ciprofloxacin.  I feel the patient can be  safely discharged home at this time given her otherwise normal workup.  The patient was given a prescription for ciprofloxacin for home she was also given a prescription for Zofran and Ultram for symptom control she was instructed return to the emergency department for any worsening symptoms but otherwise follow-up with a primary care physician.  Patient expressed understanding and agreement with this.  Patient was then discharged home in otherwise stable condition.    Amount and/or Complexity of Data Reviewed  Labs: ordered. Decision-making details documented in ED Course.  ECG/medicine tests: ordered and independent interpretation performed. Decision-making details documented in ED Course.        Procedure  Procedures         [1]   Past Medical History:  Diagnosis Date    Abdominal pain, LLQ (left lower quadrant) 07/18/2023    Acute UTI 07/18/2023    Anemia     Constipation 07/18/2023    Hx of hyperparathyroidism     Hx of malignant neoplasm of vagina     Hypertension     Iron deficiency anemia     Left lower quadrant abdominal pain 03/18/2025    Left wrist pain 07/18/2023    Neurogenic bladder     Neuropathy     Obesity with body mass index 30 or greater 03/18/2025    Osteoporosis     Pain in wrist 03/18/2025    Comment on above: WRIST PAIN      Pain of right shoulder region 03/18/2025    Palpitations 03/18/2025    Rash 03/18/2025    Recurrent UTI     Right shoulder pain 07/18/2023    Sepsis (Multi) 03/18/2025    Sepsis without acute organ dysfunction (Multi) 08/22/2023    Skin irritation 07/18/2023    Stiffness of right shoulder joint 07/18/2023    Stiffness of shoulder joint 03/18/2025    Vaginal adhesions     Vaginal cancer (Multi)    [2]   Past Surgical History:  Procedure Laterality Date    OTHER SURGICAL HISTORY  11/01/2017    Nephrectomy Right    PARATHYROID GLAND SURGERY      US GUIDED BIOPSY LYMPH NODE SUPERFICIAL  12/03/2018    US GUIDED BIOPSY LYMPH NODE SUPERFICIAL 12/3/2018 [facility]    VAGINAL  MASS EXCISION  03/09/2015    Excision Of Vaginal Cyst Or Tumor   [3]   Family History  Problem Relation Name Age of Onset    Endometrial cancer Mother      Hypertension Mother      Obesity Mother      Lung cancer Father      Leukemia Brother      Other (Endometrial carcinoma) Mother     [4]   Social History  Tobacco Use    Smoking status: Never     Passive exposure: Past    Smokeless tobacco: Never   Vaping Use    Vaping status: Never Used   Substance Use Topics    Alcohol use: Yes     Comment: social LESS THAN 1 X WEEK    Drug use: Never        Marcus Garcia MD  04/19/25 0821

## 2025-04-19 LAB — BACTERIA UR CULT: NORMAL

## 2025-04-25 ENCOUNTER — INFUSION (OUTPATIENT)
Dept: HEMATOLOGY/ONCOLOGY | Facility: CLINIC | Age: 60
End: 2025-04-25
Payer: MEDICARE

## 2025-04-25 VITALS
BODY MASS INDEX: 28.54 KG/M2 | HEART RATE: 81 BPM | OXYGEN SATURATION: 100 % | TEMPERATURE: 96.8 F | WEIGHT: 182.21 LBS | SYSTOLIC BLOOD PRESSURE: 138 MMHG | DIASTOLIC BLOOD PRESSURE: 85 MMHG | RESPIRATION RATE: 18 BRPM

## 2025-04-25 DIAGNOSIS — D50.0 IRON DEFICIENCY ANEMIA DUE TO CHRONIC BLOOD LOSS: ICD-10-CM

## 2025-04-25 PROCEDURE — 96365 THER/PROPH/DIAG IV INF INIT: CPT | Mod: INF

## 2025-04-25 PROCEDURE — 2500000004 HC RX 250 GENERAL PHARMACY W/ HCPCS (ALT 636 FOR OP/ED): Mod: JZ | Performed by: INTERNAL MEDICINE

## 2025-04-25 RX ORDER — FAMOTIDINE 10 MG/ML
20 INJECTION, SOLUTION INTRAVENOUS ONCE AS NEEDED
Status: DISCONTINUED | OUTPATIENT
Start: 2025-04-25 | End: 2025-04-25 | Stop reason: HOSPADM

## 2025-04-25 RX ORDER — DIPHENHYDRAMINE HYDROCHLORIDE 50 MG/ML
50 INJECTION, SOLUTION INTRAMUSCULAR; INTRAVENOUS AS NEEDED
Status: DISCONTINUED | OUTPATIENT
Start: 2025-04-25 | End: 2025-04-25 | Stop reason: HOSPADM

## 2025-04-25 RX ORDER — EPINEPHRINE 0.3 MG/.3ML
0.3 INJECTION SUBCUTANEOUS EVERY 5 MIN PRN
Status: DISCONTINUED | OUTPATIENT
Start: 2025-04-25 | End: 2025-04-25 | Stop reason: HOSPADM

## 2025-04-25 RX ORDER — ALBUTEROL SULFATE 0.83 MG/ML
3 SOLUTION RESPIRATORY (INHALATION) AS NEEDED
Status: DISCONTINUED | OUTPATIENT
Start: 2025-04-25 | End: 2025-04-25 | Stop reason: HOSPADM

## 2025-04-25 RX ORDER — DIPHENHYDRAMINE HYDROCHLORIDE 50 MG/ML
50 INJECTION, SOLUTION INTRAMUSCULAR; INTRAVENOUS AS NEEDED
OUTPATIENT
Start: 2025-04-28

## 2025-04-25 RX ORDER — FAMOTIDINE 10 MG/ML
20 INJECTION, SOLUTION INTRAVENOUS ONCE AS NEEDED
OUTPATIENT
Start: 2025-04-28

## 2025-04-25 RX ORDER — ALBUTEROL SULFATE 0.83 MG/ML
3 SOLUTION RESPIRATORY (INHALATION) AS NEEDED
OUTPATIENT
Start: 2025-04-28

## 2025-04-25 RX ORDER — EPINEPHRINE 0.3 MG/.3ML
0.3 INJECTION SUBCUTANEOUS EVERY 5 MIN PRN
OUTPATIENT
Start: 2025-04-28

## 2025-04-25 RX ADMIN — IRON SUCROSE 300 MG: 20 INJECTION, SOLUTION INTRAVENOUS at 11:41

## 2025-04-25 ASSESSMENT — PAIN SCALES - GENERAL
PAINLEVEL_OUTOF10: 0-NO PAIN
PAINLEVEL_OUTOF10: 0-NO PAIN

## 2025-05-02 ENCOUNTER — APPOINTMENT (OUTPATIENT)
Dept: HEMATOLOGY/ONCOLOGY | Facility: CLINIC | Age: 60
End: 2025-05-02
Payer: MEDICARE

## 2025-05-09 ENCOUNTER — INFUSION (OUTPATIENT)
Dept: HEMATOLOGY/ONCOLOGY | Facility: CLINIC | Age: 60
End: 2025-05-09
Payer: MEDICARE

## 2025-05-09 VITALS
HEART RATE: 63 BPM | OXYGEN SATURATION: 100 % | WEIGHT: 183.53 LBS | DIASTOLIC BLOOD PRESSURE: 87 MMHG | RESPIRATION RATE: 20 BRPM | SYSTOLIC BLOOD PRESSURE: 138 MMHG | BODY MASS INDEX: 28.75 KG/M2 | TEMPERATURE: 97 F

## 2025-05-09 DIAGNOSIS — E55.9 AVITAMINOSIS D: ICD-10-CM

## 2025-05-09 DIAGNOSIS — M85.80 OSTEOPENIA, UNSPECIFIED LOCATION: ICD-10-CM

## 2025-05-09 DIAGNOSIS — D50.0 IRON DEFICIENCY ANEMIA DUE TO CHRONIC BLOOD LOSS: ICD-10-CM

## 2025-05-09 PROCEDURE — 2500000004 HC RX 250 GENERAL PHARMACY W/ HCPCS (ALT 636 FOR OP/ED): Mod: JZ | Performed by: INTERNAL MEDICINE

## 2025-05-09 PROCEDURE — 96365 THER/PROPH/DIAG IV INF INIT: CPT | Mod: INF

## 2025-05-09 RX ORDER — DIPHENHYDRAMINE HYDROCHLORIDE 50 MG/ML
50 INJECTION, SOLUTION INTRAMUSCULAR; INTRAVENOUS AS NEEDED
OUTPATIENT
Start: 2025-05-12

## 2025-05-09 RX ORDER — EPINEPHRINE 0.3 MG/.3ML
0.3 INJECTION SUBCUTANEOUS EVERY 5 MIN PRN
Status: DISCONTINUED | OUTPATIENT
Start: 2025-05-09 | End: 2025-05-09 | Stop reason: HOSPADM

## 2025-05-09 RX ORDER — FAMOTIDINE 10 MG/ML
20 INJECTION, SOLUTION INTRAVENOUS ONCE AS NEEDED
OUTPATIENT
Start: 2025-05-12

## 2025-05-09 RX ORDER — ALBUTEROL SULFATE 0.83 MG/ML
3 SOLUTION RESPIRATORY (INHALATION) AS NEEDED
Status: DISCONTINUED | OUTPATIENT
Start: 2025-05-09 | End: 2025-05-09 | Stop reason: HOSPADM

## 2025-05-09 RX ORDER — DIPHENHYDRAMINE HYDROCHLORIDE 50 MG/ML
50 INJECTION, SOLUTION INTRAMUSCULAR; INTRAVENOUS AS NEEDED
Status: DISCONTINUED | OUTPATIENT
Start: 2025-05-09 | End: 2025-05-09 | Stop reason: HOSPADM

## 2025-05-09 RX ORDER — ALBUTEROL SULFATE 0.83 MG/ML
3 SOLUTION RESPIRATORY (INHALATION) AS NEEDED
OUTPATIENT
Start: 2025-05-12

## 2025-05-09 RX ORDER — EPINEPHRINE 0.3 MG/.3ML
0.3 INJECTION SUBCUTANEOUS EVERY 5 MIN PRN
OUTPATIENT
Start: 2025-05-12

## 2025-05-09 RX ORDER — FAMOTIDINE 10 MG/ML
20 INJECTION, SOLUTION INTRAVENOUS ONCE AS NEEDED
Status: DISCONTINUED | OUTPATIENT
Start: 2025-05-09 | End: 2025-05-09 | Stop reason: HOSPADM

## 2025-05-09 RX ADMIN — IRON SUCROSE 300 MG: 20 INJECTION, SOLUTION INTRAVENOUS at 11:02

## 2025-05-09 ASSESSMENT — PAIN SCALES - GENERAL: PAINLEVEL_OUTOF10: 0-NO PAIN

## 2025-05-09 NOTE — PROGRESS NOTES
pt tolerated today's Venofer infusions without difficulty. VS stable post infusion, pt monitored for 30 minutes post and pt without any complaints at discharge. pt aware of upcoming appt schedule. Pt to call with any questions and/or concerns.

## 2025-05-12 RX ORDER — DENOSUMAB 60 MG/ML
INJECTION SUBCUTANEOUS
Qty: 1 ML | Refills: 2 | Status: SHIPPED | OUTPATIENT
Start: 2025-05-12 | End: 2025-11-12

## 2025-05-15 ENCOUNTER — SPECIALTY PHARMACY (OUTPATIENT)
Dept: PHARMACY | Facility: CLINIC | Age: 60
End: 2025-05-15

## 2025-05-22 ENCOUNTER — SPECIALTY PHARMACY (OUTPATIENT)
Dept: PHARMACY | Facility: CLINIC | Age: 60
End: 2025-05-22

## 2025-05-23 PROCEDURE — RXMED WILLOW AMBULATORY MEDICATION CHARGE

## 2025-05-27 ENCOUNTER — SPECIALTY PHARMACY (OUTPATIENT)
Dept: PHARMACY | Facility: CLINIC | Age: 60
End: 2025-05-27

## 2025-05-28 ENCOUNTER — PHARMACY VISIT (OUTPATIENT)
Dept: PHARMACY | Facility: CLINIC | Age: 60
End: 2025-05-28
Payer: MEDICARE

## 2025-06-12 ENCOUNTER — TELEPHONE (OUTPATIENT)
Dept: ENDOCRINOLOGY | Facility: CLINIC | Age: 60
End: 2025-06-12
Payer: MEDICARE

## 2025-06-12 DIAGNOSIS — M81.8 OTHER OSTEOPOROSIS, UNSPECIFIED PATHOLOGICAL FRACTURE PRESENCE: Primary | ICD-10-CM

## 2025-06-12 DIAGNOSIS — E55.9 AVITAMINOSIS D: ICD-10-CM

## 2025-06-12 DIAGNOSIS — M85.80 OSTEOPENIA, UNSPECIFIED LOCATION: ICD-10-CM

## 2025-06-12 NOTE — TELEPHONE ENCOUNTER
Patient called today stating that she due for her Prolia injection and usually gets blood work a week before the injection. She would like orders for blood work and she'll be here 6/20/25 for her injection.

## 2025-06-13 ENCOUNTER — TELEPHONE (OUTPATIENT)
Dept: ENDOCRINOLOGY | Facility: CLINIC | Age: 60
End: 2025-06-13
Payer: MEDICARE

## 2025-06-13 NOTE — TELEPHONE ENCOUNTER
Spoke with patient today to inform her that the orders are placed and her appt for her injection is for 6/20/25.

## 2025-06-14 LAB
25(OH)D3+25(OH)D2 SERPL-MCNC: 64 NG/ML (ref 30–100)
ANION GAP SERPL CALCULATED.4IONS-SCNC: 8 MMOL/L (CALC) (ref 7–17)
BUN SERPL-MCNC: 18 MG/DL (ref 7–25)
BUN/CREAT SERPL: 15 (CALC) (ref 6–22)
CALCIUM SERPL-MCNC: 9.4 MG/DL (ref 8.6–10.4)
CHLORIDE SERPL-SCNC: 108 MMOL/L (ref 98–110)
CO2 SERPL-SCNC: 23 MMOL/L (ref 20–32)
CREAT SERPL-MCNC: 1.23 MG/DL (ref 0.5–1.03)
EGFRCR SERPLBLD CKD-EPI 2021: 51 ML/MIN/1.73M2
GLUCOSE SERPL-MCNC: 87 MG/DL (ref 65–139)
POTASSIUM SERPL-SCNC: 4.4 MMOL/L (ref 3.5–5.3)
SODIUM SERPL-SCNC: 139 MMOL/L (ref 135–146)

## 2025-06-20 ENCOUNTER — TELEPHONE (OUTPATIENT)
Dept: ENDOCRINOLOGY | Facility: CLINIC | Age: 60
End: 2025-06-20

## 2025-06-20 ENCOUNTER — APPOINTMENT (OUTPATIENT)
Dept: ENDOCRINOLOGY | Facility: CLINIC | Age: 60
End: 2025-06-20
Payer: MEDICARE

## 2025-06-20 NOTE — TELEPHONE ENCOUNTER
Patient came into clinic this morning to receive her Prolia injection. Given to back of left arm. Tolerated well has no c/o pain or discomfort.

## 2025-07-05 DIAGNOSIS — N89.5: ICD-10-CM

## 2025-07-07 RX ORDER — ACYCLOVIR 400 MG/1
400 TABLET ORAL 2 TIMES DAILY
Qty: 180 TABLET | Refills: 0 | Status: SHIPPED | OUTPATIENT
Start: 2025-07-07

## 2025-09-19 ENCOUNTER — APPOINTMENT (OUTPATIENT)
Dept: PRIMARY CARE | Facility: CLINIC | Age: 60
End: 2025-09-19
Payer: MEDICARE

## 2025-09-30 ENCOUNTER — APPOINTMENT (OUTPATIENT)
Dept: HEMATOLOGY/ONCOLOGY | Facility: CLINIC | Age: 60
End: 2025-09-30
Payer: MEDICARE

## 2026-02-05 ENCOUNTER — APPOINTMENT (OUTPATIENT)
Dept: ENDOCRINOLOGY | Facility: CLINIC | Age: 61
End: 2026-02-05
Payer: MEDICARE

## (undated) DEVICE — GLOVE, SURGICAL, PROTEXIS MICRO, 7.0, PF, LATEX

## (undated) DEVICE — PREP TRAY, SKIN, DRY, W/GLOVES

## (undated) DEVICE — GUIDEWIRE, SOLO FLEX HYBRID, .035 STRAIGHT TIP

## (undated) DEVICE — CUFF, TOURNIQUET 18 DUAL PORT/SNGL BLAD"

## (undated) DEVICE — TISSUE ADHESIVE, EXOFIN, 1ML

## (undated) DEVICE — COLLECTION BAG, FLUID, NON-STERILE

## (undated) DEVICE — Device

## (undated) DEVICE — GLOVE, SURGICAL, PROTEXIS PI , 7.5, PF, LF

## (undated) DEVICE — TOWEL PACK, STERILE, 4/PACK, BLUE

## (undated) DEVICE — TOWEL, SURGICAL, NEURO, O/R, 16 X 26, BLUE, STERILE

## (undated) DEVICE — STOCKINETTE, DOUBLE PLY, 4 X 48 IN, STERILE

## (undated) DEVICE — GLOVE, SURGICAL, PROTEXIS PI BLUE W/NEUTHERA, 8.0, PF, LF

## (undated) DEVICE — GUIDEWIRE, DUAL SENSOR, .035 X 150 STRAIGHT,  3CM

## (undated) DEVICE — SYRINGE, 20 CC, LUER LOCK, MONOJECT, W/O CAP, LF

## (undated) DEVICE — TUBING, SUCTION, CONNECTING, NON-CONDUCTIVE, SURE GRIP CONNECTORS, 3/16 IN X 10 FT

## (undated) DEVICE — SUTURE, MONOCRYL PLUS 4-0, P-3, 18IN, UNDYED

## (undated) DEVICE — PADDING, WEBRIL, UNDERCAST, STERILE, 3 IN

## (undated) DEVICE — BANDAGE, ELASTIC, ACE, SELF-CLOSURE, 3 IN X 5 YD, NONSTERILE

## (undated) DEVICE — APPLICATOR, CHLORAPREP, W/ORANGE TINT, 26ML

## (undated) DEVICE — CATHETER, URETERAL, POLLACK, OPEN END, 5.5 FR, 70 CM

## (undated) DEVICE — DRESSING, GAUZE, SPONGE, 12 PLY, 4 X 4 IN, PLASTIC POUCH, STRL 10PK

## (undated) DEVICE — BLANKET, LOWER BODY, VHA PLUS, ADULT